# Patient Record
Sex: FEMALE | Race: WHITE | Employment: UNEMPLOYED | URBAN - NONMETROPOLITAN AREA
[De-identification: names, ages, dates, MRNs, and addresses within clinical notes are randomized per-mention and may not be internally consistent; named-entity substitution may affect disease eponyms.]

---
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
## 2022-03-02 ENCOUNTER — INITIAL PRENATAL (OUTPATIENT)
Dept: OBSTETRICS AND GYNECOLOGY | Facility: CLINIC | Age: 22
End: 2022-03-02

## 2022-03-02 VITALS — DIASTOLIC BLOOD PRESSURE: 64 MMHG | WEIGHT: 120 LBS | BODY MASS INDEX: 20.6 KG/M2 | SYSTOLIC BLOOD PRESSURE: 100 MMHG

## 2022-03-02 DIAGNOSIS — Z34.01 ENCOUNTER FOR SUPERVISION OF NORMAL FIRST PREGNANCY IN FIRST TRIMESTER: Primary | ICD-10-CM

## 2022-03-02 DIAGNOSIS — Z78.9 NONSMOKER: ICD-10-CM

## 2022-03-02 LAB
ABO, EXTERNAL RESULT: NORMAL
C. TRACHOMATIS, EXTERNAL RESULT: NEGATIVE
HEP B, EXTERNAL RESULT: NEGATIVE
HEPATITIS C ANTIBODY, EXTERNAL RESULT: 0.1
HIV, EXTERNAL RESULT: NON REACTIVE
N. GONORRHOEAE, EXTERNAL RESULT: NEGATIVE
RH FACTOR, EXTERNAL RESULT: POSITIVE
RPR, EXTERNAL RESULT: NON REACTIVE
RUBELLA TITER, EXTERNAL RESULT: NORMAL

## 2022-03-02 PROCEDURE — 99213 OFFICE O/P EST LOW 20 MIN: CPT | Performed by: OBSTETRICS & GYNECOLOGY

## 2022-03-02 NOTE — PROGRESS NOTES
"Intentional pregnancy.  Some early morning nausea, but no emesis. Patient thinks she is feeling \"really good\".  No cramping or VB.  Healthy, no meds.  Non-smoker.  New OB information reviewed.  Labs today, including [id number].  Physical exam unremarkable.  "

## 2022-03-11 LAB
ABO GROUP BLD: NORMAL
BACTERIA UR CULT: ABNORMAL
BACTERIA UR CULT: ABNORMAL
BASOPHILS # BLD AUTO: 0.03 10*3/MM3 (ref 0–0.2)
BASOPHILS NFR BLD AUTO: 0.5 % (ref 0–1.5)
BLD GP AB SCN SERPL QL: NEGATIVE
C TRACH RRNA SPEC QL NAA+PROBE: NEGATIVE
DRUGS UR: NORMAL
EOSINOPHIL # BLD AUTO: 0.05 10*3/MM3 (ref 0–0.4)
EOSINOPHIL NFR BLD AUTO: 0.8 % (ref 0.3–6.2)
ERYTHROCYTE [DISTWIDTH] IN BLOOD BY AUTOMATED COUNT: 12.4 % (ref 12.3–15.4)
HBV SURFACE AG SERPL QL IA: NEGATIVE
HCT VFR BLD AUTO: 36.4 % (ref 34–46.6)
HCV AB S/CO SERPL IA: 0.1 S/CO RATIO (ref 0–0.9)
HGB BLD-MCNC: 11.7 G/DL (ref 12–15.9)
HIV 1+2 AB+HIV1 P24 AG SERPL QL IA: NON REACTIVE
IMM GRANULOCYTES # BLD AUTO: 0.02 10*3/MM3 (ref 0–0.05)
IMM GRANULOCYTES NFR BLD AUTO: 0.3 % (ref 0–0.5)
LYMPHOCYTES # BLD AUTO: 1.4 10*3/MM3 (ref 0.7–3.1)
LYMPHOCYTES NFR BLD AUTO: 22.5 % (ref 19.6–45.3)
MCH RBC QN AUTO: 30.2 PG (ref 26.6–33)
MCHC RBC AUTO-ENTMCNC: 32.1 G/DL (ref 31.5–35.7)
MCV RBC AUTO: 94.1 FL (ref 79–97)
MONOCYTES # BLD AUTO: 0.55 10*3/MM3 (ref 0.1–0.9)
MONOCYTES NFR BLD AUTO: 8.9 % (ref 5–12)
N GONORRHOEA RRNA SPEC QL NAA+PROBE: NEGATIVE
NEUTROPHILS # BLD AUTO: 4.16 10*3/MM3 (ref 1.7–7)
NEUTROPHILS NFR BLD AUTO: 67 % (ref 42.7–76)
NRBC BLD AUTO-RTO: 0 /100 WBC (ref 0–0.2)
PLATELET # BLD AUTO: 251 10*3/MM3 (ref 140–450)
RBC # BLD AUTO: 3.87 10*6/MM3 (ref 3.77–5.28)
RH BLD: POSITIVE
RPR SER QL: NON REACTIVE
RUBV IGG SERPL IA-ACNC: <0.9 INDEX
WBC # BLD AUTO: 6.21 10*3/MM3 (ref 3.4–10.8)

## 2022-03-14 DIAGNOSIS — O23.41 URINARY TRACT INFECTION IN MOTHER DURING FIRST TRIMESTER OF PREGNANCY: Primary | ICD-10-CM

## 2022-03-14 RX ORDER — AMPICILLIN 500 MG/1
500 CAPSULE ORAL 4 TIMES DAILY
Qty: 20 CAPSULE | Refills: 0 | Status: SHIPPED | OUTPATIENT
Start: 2022-03-14 | End: 2022-04-01

## 2022-03-21 ENCOUNTER — REFERRAL TRIAGE (OUTPATIENT)
Dept: LABOR AND DELIVERY | Facility: HOSPITAL | Age: 22
End: 2022-03-21

## 2022-03-29 ENCOUNTER — PATIENT OUTREACH (OUTPATIENT)
Dept: LABOR AND DELIVERY | Facility: HOSPITAL | Age: 22
End: 2022-03-29

## 2022-03-29 NOTE — OUTREACH NOTE
[facility]  Enrollment    Questions/Answers    Flowsheet Row Responses   Would like to participate? Yes          Intake    Questions/Answers    Flowsheet Row Responses   Best Method for Contacting Cell   Do you have a dentist? Yes   Dentist Name Dental Clinics sent in my chart resource letter   Have you seen a dentist in the last 6 months No   Resources Presently Utilizing: WIC (Women, Infant, Children)   Maternal Warning Signs Provided   Warning signs comment: in resource letter   Other: Provided   Comments in resource letter        Resource letter, prenatal education, EPDS questionnaire sent to client in her mychart.  HANDS referral faxed today to [facility]    Shruti Asencio RN  Maternity Nurse Navigator    3/29/2022, 15:01 CDT

## 2022-03-29 NOTE — PATIENT INSTRUCTIONS
"Dear Dinorah Bond     Welcome to [facility]!      Thanks for taking to the time to speak with me today.  I enjoyed getting to know you a little better and look forward to supporting you during your pregnancy.      Insurance:     Well[employer]:  Website:   [facility] [employer] [facility] ([employer])  Phone Number: [phone]        Prenatal Classes:  Maternity Classes [facility])    Virtual Tour:  Maternity Tours [facility]     [email or web address]      Provider Search:     If you'd like to schedule an appointment with a primary care provider in the [facility], you can call this number and they will help you make an appointment with a provider that accepts your insurance:  [phone]    Behavioral Health or Substance Use Disorders    [place]-Moms MATR (Maternal Assistance Towards Recovery) helps expectant [place] mothers who are at risk for using alcohol, tobacco and other drugs, to reduce harm to their children from their substance use, during and after pregnancy. This service is a collaboration between health departments, prenatal clinics and community mental health centers.  https://dbhdid.ky.gov/db/kymomsmatr.aspx#:~:text=[place]-Moms%20MATR%20%28Maternal%20Assistance%20Towards%20Recovery%29%20helps%20expectant,departments%2C%20prenatal%20clinics%20and%20community%20mental%20health%20centers    Daniel ([facility] free Maternity Елена)   Evidence-based information and classes on prenatal care, labor & birth, postpartum, breastfeeding, and  care including lots of videos.  Information specific to your arrival and stay at [facility].  Kick counter, contraction timer, personal journal, feeding log, immunization log, and other tools  If you would like free access to our online education, please register below. You can also download the елена by searching for \"MyRosetteo\" in [employer], Google Play and [employer].  On your " smartphone or computer, go to [facility] in [place], [place]  Scroll down to bottom of page and select “MotherBaby” icon.  On next page, select “During Your Pregnancy”.  Scroll down page to “[place] [facility] Free Maternity Елена”.  Click on PURPLE button to register.  Complete all fields and submit!  OR with your smartphones camera, scan the QR code below:      Or click this link:  [email or web address]    [facility]:  [facility]  Phone number: [phone]  [address] [place] [place] [address]   for every [facility] visit, you need to bring personal identification, proof of income -- like a pay stub and your home address        HANDS:  Website: [employer] ([employer])  Phone number: [phone]    Breast Pump:  Website: Breast Pumps [employer] ([email or web address].com)  Phone Number: [phone]    Dental Assistance:  Dentistry [phone] or [facility] [phone]          What to Expect During Your Second Trimester    Many moms-to-be feel a bit better from weeks 13 to 28 of their pregnancy. You may not be as tired or nauseated as you were during your first trimester. Your baby is doing a lot of growing during this time, though. You'll typically continue seeing your prenatal provider every four weeks.     Common second trimester lab tests    You'll undergo a few routine tests during this period of your pregnancy:    Rh testing: Your provider will test your blood's Rhesus (Rh) factor, which is an immune system-related protein most people have in their blood.    Oral glucose challenge test: Between your 24th and 28th weeks of pregnancy, you will visit a lab and drink a sweet liquid (called Glucola) to test for gestational diabetes. The test takes about an hour. Gestational diabetes is a unique type of blood sugar condition that only develops when you are pregnant. Learn more about  Gestational Diabetes    Ultrasound: This safe test uses sound waves to create images of your baby. It can help detect some medical conditions your baby might be developing, such as cleft lip/cleft palate. However, it can also be an extremely exciting test because:    Your ultrasound technician may be able to tell you your baby's gender -- if you want to know.   If your [facility] location offers 4-D ultrasound, you may be able to see your baby moving or sucking his or her thumb.     Amniocentesis: This test gives your doctor more detailed information about your baby's health. However, not every expectant mom needs this test, which involves drawing a sample of the amniotic fluid surrounding your baby. Your provider might recommend this test if you:    Had concerning screening test results earlier in your pregnancy  Have been pregnant before with a baby who had medical issues  Are age 35 or older  Have a family history of certain medical conditions    Second trimester: When to call your provider    Most pregnant women feel energetic and healthy during this trimester. However, there is always a chance that you or your baby could develop health problems or that you could go into premature labor.     In addition to the symptoms described for your first trimester, call your healthcare provider, or go to the nearest emergency room if you experience any of these symptoms:    Intense cramping or abdominal pain   A significant decrease in your baby's movement after week 28 (fewer than about 6 to 10 movements in an hour)   Shortness of breath or trouble breathing  Tightening or pain in your back or lower abdomen that could be contractions  Feeling of intense pressure in your pelvis or vagina   Any vaginal bleeding or leaking fluid       If you have any questions, concerns, or need help with resources or support don't hesitate to call anytime.       Thanks,    Shruti CHAVES  Maternal Child  Nurse Navigator  [facility]  [place]  [address] [place] [address] [place] [address]    [phone]

## 2022-03-29 NOTE — OUTREACH NOTE
[facility]  Unable to Reach       Questions/Answers    Flowsheet Row Responses   Pending Outreach Confirm Patient Interest   Call Attempt First   Outcome Left message              Shruti Asencio, RN  Maternity Nurse Navigator    3/29/2022, 13:27 CDT

## 2022-04-01 ENCOUNTER — ROUTINE PRENATAL (OUTPATIENT)
Dept: OBSTETRICS AND GYNECOLOGY | Facility: CLINIC | Age: 22
End: 2022-04-01

## 2022-04-01 VITALS — DIASTOLIC BLOOD PRESSURE: 62 MMHG | WEIGHT: 121 LBS | SYSTOLIC BLOOD PRESSURE: 112 MMHG | BODY MASS INDEX: 20.77 KG/M2

## 2022-04-01 DIAGNOSIS — Z78.9 NON-SMOKER: ICD-10-CM

## 2022-04-01 DIAGNOSIS — Z28.21 COVID-19 VACCINE SERIES DECLINED: ICD-10-CM

## 2022-04-01 DIAGNOSIS — Z28.310 COVID-19 VACCINE SERIES DECLINED: ICD-10-CM

## 2022-04-01 DIAGNOSIS — R82.71 GBS BACTERIURIA: ICD-10-CM

## 2022-04-01 DIAGNOSIS — Z34.02 PRENATAL CARE, FIRST PREGNANCY, SECOND TRIMESTER: Primary | ICD-10-CM

## 2022-04-01 LAB
GLUCOSE UR STRIP-MCNC: NEGATIVE MG/DL
PROT UR STRIP-MCNC: NEGATIVE MG/DL

## 2022-04-01 PROCEDURE — 99213 OFFICE O/P EST LOW 20 MIN: CPT | Performed by: ADVANCED PRACTICE MIDWIFE

## 2022-04-01 NOTE — PROGRESS NOTES
Reason for visit: Routine OB visit at 14w3d     CC:  21-yr old  here for routine OBV at 14w3d.  MICHELLE 2022, by Ultrasound.  Patient denies VB, LOF, contractions, or other concerns. Also denies visual disturbances and epigastric pain. She reports occasional episodes of dizziness or being lightheaded. She also reports headaches.    ROS: All systems reviewed and are negative with exception of the following: dizziness/lightheaded occasionally, headaches    Wt 121 lb for a TWG of 0.454 kg (1 lb), /62, FHTs 142, FH 14 cm  Urine today and reviewed: negative glucose, negative protein    20-week G&D U/S: scheduled for 2022    Exam:  General Appearance:  Healthy appearing . Normal mood and behavior.  HEENT: NCAT, EOMI  HR str and reg. Lungs clear. Resp even and unlabored.  Abdomen is soft and nontender. Bowel sounds active. Uterus is consistent with EGA  Ext: no edema, nontender, no trauma, or cyanosis.    Impression  Diagnoses and all orders for this visit:    1. Prenatal care, first pregnancy, second trimester (Primary)  - Discussed second trimester of pregnancy, including discomforts and measures of comfort, pelvic pain/cramping warnings, bleeding warnings, and signs and symptoms to report. Second trimester of pregnancy video and [facility] education included in the AVS.   - Encouraged adequate hydration, changing positions slowly, and reviewed signs of dehydration. Discussed headaches, signs to report/return, and measures of support both pharmacologic OTC and nonpharmacologic measures.   - Discussed ffDNA screening results.  - Discussed and encouraged to come to the hospital or call for vaginal bleeding, suspected leaking of fluid, pelvic pain/cramping, or any other concerns.  - Return to office in four weeks for routine OB visit with Dr. Lopez with peek U/S and as needed with concerns.    2. GBS bacteriuria  - Noted on initial prenatal urine culture.     3. COVID-19 vaccine series  declined  - Discussed with patient COVID-19 vaccine recommendations during pregnancy.  Patient declines plan to initiate vaccine series at present.    4. Non-smoker        This note has been signed electronically.    Binta Henry DNP, APRN, CNM, RNC-OB

## 2022-04-28 ENCOUNTER — ROUTINE PRENATAL (OUTPATIENT)
Dept: OBSTETRICS AND GYNECOLOGY | Facility: CLINIC | Age: 22
End: 2022-04-28

## 2022-04-28 VITALS — DIASTOLIC BLOOD PRESSURE: 64 MMHG | WEIGHT: 124 LBS | SYSTOLIC BLOOD PRESSURE: 96 MMHG | BODY MASS INDEX: 21.28 KG/M2

## 2022-04-28 DIAGNOSIS — Z34.02 ENCOUNTER FOR SUPERVISION OF NORMAL FIRST PREGNANCY IN SECOND TRIMESTER: Primary | ICD-10-CM

## 2022-04-28 DIAGNOSIS — Z78.9 NON-SMOKER: ICD-10-CM

## 2022-04-28 LAB
GLUCOSE UR STRIP-MCNC: NEGATIVE MG/DL
PROT UR STRIP-MCNC: NEGATIVE MG/DL

## 2022-04-28 PROCEDURE — 99212 OFFICE O/P EST SF 10 MIN: CPT | Performed by: OBSTETRICS & GYNECOLOGY

## 2022-04-28 NOTE — PROGRESS NOTES
Feeling good.  No cramping or VB  Not feeling any movement yet  Weight gain appropriate  MFM anatomy scan already scheduled

## 2022-05-25 NOTE — PROGRESS NOTES
"Subjective   Dinorah Denis Bond is a 19 y.o. female.     Pelvic pain  pt was seen in [facility] ER 2/13/20. pt states that she was given flagyl, will end on Monday. pt reports that she has not had sex since 2/3/2020 and she has not started her BCP      The following portions of the patient's history were reviewed and updated as appropriate: allergies, current medications, past family history, past medical history, past social history, past surgical history and problem list.    /64   Ht 162.6 cm (64\")   Wt 46.7 kg (103 lb)   LMP 01/30/2020 (Exact Date)   Breastfeeding No   BMI 17.68 kg/m²     Review of Systems   Constitutional: Negative for activity change, appetite change, fatigue and fever.   Respiratory: Negative for apnea and shortness of breath.    Cardiovascular: Negative for chest pain and palpitations.   Gastrointestinal: Negative for abdominal distention, abdominal pain, constipation, diarrhea, nausea and vomiting.   Endocrine: Negative for cold intolerance and heat intolerance.   Genitourinary: Positive for pelvic pain (tenderness, decreased but still present) and vaginal discharge. Negative for difficulty urinating, frequency, menstrual problem, urgency and vaginal pain.   Neurological: Negative for headaches.   Psychiatric/Behavioral: Negative for agitation and sleep disturbance.       Objective   Physical Exam   Constitutional: She is oriented to person, place, and time. She appears well-developed.   HENT:   Head: Normocephalic.   Neck: No tracheal deviation present.   Cardiovascular: Normal rate.   Pulmonary/Chest: Breath sounds normal. She has no wheezes.   Abdominal: Soft. There is no tenderness.   Genitourinary: Rectum normal. There is no rash, tenderness or lesion on the right labia. There is no rash, tenderness or lesion on the left labia. Uterus is not deviated, not enlarged and not tender. Cervix exhibits no motion tenderness and no discharge. Right adnexum displays no mass, no " tenderness and no fullness. Left adnexum displays no mass, no tenderness and no fullness. There is erythema and tenderness in the vagina. Vaginal discharge found.   Lymphadenopathy:        Right: No inguinal adenopathy present.        Left: No inguinal adenopathy present.   Neurological: She is alert and oriented to person, place, and time. She has normal strength.   Skin: Skin is warm and dry. No rash noted. No cyanosis.   Psychiatric: She has a normal mood and affect. Her speech is normal and behavior is normal.       Assessment/Plan   Discussed pt symptoms, testing and treatment.   Will treat with Doxycycline at this time.   Diflucan sent in also.   Pt to return in 1 wk to reassess.   Reviewed S&S to report, pt voiced understanding.     Patient's Body mass index is 17.68 kg/m². BMI is within normal parameters. No follow-up required..    RV 1 wk/prn.   Dinorah Barrios was seen today for pelvic pain.    Diagnoses and all orders for this visit:    Pelvic pain    Gonorrhea    Chlamydia    Body mass index (BMI) less than 19    Non-smoker    Other orders  -     doxycycline (MONODOX) 100 MG capsule; Take 1 capsule by mouth 2 (Two) Times a Day.  -     fluconazole (DIFLUCAN) 150 MG tablet; Take 1 tablet by mouth Every Other Day for 2 doses.                 [employer] Counseling:  I discussed with the patient the risks of mycophenolate mofetil including but not limited to infection/immunosuppression, GI upset, hypokalemia, hypercholesterolemia, bone marrow suppression, lymphoproliferative disorders, malignancy, GI ulceration/bleed/perforation, colitis, interstitial lung disease, kidney failure, progressive multifocal leukoencephalopathy, and birth defects.  The patient understands that monitoring is required including a baseline creatinine and regular CBC testing. In addition, patient must alert us immediately if symptoms of infection or other concerning signs are noted.

## 2022-06-01 ENCOUNTER — ROUTINE PRENATAL (OUTPATIENT)
Dept: OBSTETRICS AND GYNECOLOGY | Facility: CLINIC | Age: 22
End: 2022-06-01

## 2022-06-01 VITALS — SYSTOLIC BLOOD PRESSURE: 112 MMHG | BODY MASS INDEX: 22.49 KG/M2 | DIASTOLIC BLOOD PRESSURE: 60 MMHG | WEIGHT: 131 LBS

## 2022-06-01 DIAGNOSIS — Z87.891 FORMER SMOKER: ICD-10-CM

## 2022-06-01 DIAGNOSIS — Z28.21 COVID-19 VACCINE SERIES DECLINED: ICD-10-CM

## 2022-06-01 DIAGNOSIS — Z28.310 COVID-19 VACCINE SERIES DECLINED: ICD-10-CM

## 2022-06-01 DIAGNOSIS — Z34.02 ENCOUNTER FOR SUPERVISION OF NORMAL FIRST PREGNANCY IN SECOND TRIMESTER: Primary | ICD-10-CM

## 2022-06-01 LAB
GLUCOSE UR STRIP-MCNC: NEGATIVE MG/DL
PROT UR STRIP-MCNC: NEGATIVE MG/DL

## 2022-06-01 PROCEDURE — 99212 OFFICE O/P EST SF 10 MIN: CPT | Performed by: ADVANCED PRACTICE MIDWIFE

## 2022-06-01 NOTE — PROGRESS NOTES
Reason for visit: Routine OB visit at 23w1d     CC:  21-yr old  here for routine OBV at 23w1d.  MICHELLE 2022, by Ultrasound.  Patient reports feeling fetal movement and denies VB, LOF, contractions, or other concerns. Also denies h/a, visual disturbances, and epigastric pain.     ROS: All systems reviewed and are negative.    Wt 131 lb for a TWG of 4.99 kg (11 lb), /60, FHTs 141, FH 21   Urine today and reviewed: negative glucose, negative protein    21-week (2022) G&D U/S: anatomy scan complete with no structural anomalies visualized; overall size consistent with dates; 3-vessel cord; anterior placenta with no evidence of previa    Exam:  General Appearance:  Healthy appearing . Normal mood and behavior.  HEENT: NCAT, EOMI  HR str and reg. Lungs clear. Resp even and unlabored.  Abdomen is soft and nontender. Bowel sounds active. Uterus is consistent with EGA  Ext: no edema, nontender, no trauma, or cyanosis.    Impression  Diagnoses and all orders for this visit:    1. Encounter for supervision of normal first pregnancy in second trimester (Primary)  - Discussed second trimester of pregnancy, discomforts and measures of support, fetal movement,  labor warnings, preeclampsia warnings, and signs and symptoms to report. Second Trimester of Pregnancy video and Round Ligament Pain education included in the AVS.  - Discussed plan for glucose tolerance test and hemoglobin for the next visit.  - Discussed and encouraged to call or come to the hospital for vaginal bleeding, leaking of fluid, contractions, or any other concerns.  - Return to the office in four weeks with Dr. Lopez and as needed with concerns.    2. COVID-19 vaccine series declined  - Aware of Covid-19 vaccine recommendations during pregnancy.    3. Former smoker        This note has been signed electronically.    Binta Henry, DNP, APRN, CNM, RNC-OB

## 2022-06-15 ENCOUNTER — ROUTINE PRENATAL (OUTPATIENT)
Dept: OBSTETRICS AND GYNECOLOGY | Facility: CLINIC | Age: 22
End: 2022-06-15

## 2022-06-15 VITALS — WEIGHT: 135 LBS | DIASTOLIC BLOOD PRESSURE: 62 MMHG | SYSTOLIC BLOOD PRESSURE: 112 MMHG | BODY MASS INDEX: 23.17 KG/M2

## 2022-06-15 DIAGNOSIS — Z87.891 FORMER SMOKER: ICD-10-CM

## 2022-06-15 DIAGNOSIS — Z34.02 ENCOUNTER FOR SUPERVISION OF NORMAL FIRST PREGNANCY IN SECOND TRIMESTER: Primary | ICD-10-CM

## 2022-06-15 LAB
GLUCOSE UR STRIP-MCNC: NEGATIVE MG/DL
PROT UR STRIP-MCNC: NEGATIVE MG/DL

## 2022-06-15 PROCEDURE — 99212 OFFICE O/P EST SF 10 MIN: CPT | Performed by: OBSTETRICS & GYNECOLOGY

## 2022-06-15 NOTE — PROGRESS NOTES
"Patient reports occasional epigastric pain, but no N/V.  No constipation.  Pain has a \"burning\" and \"sharp\" feeling - advised her to pay attention to diet.  No cramping or VB.  No LOF.  Good FM  GCT at next visit; instructions given  "

## 2022-06-18 ENCOUNTER — HOSPITAL ENCOUNTER (OUTPATIENT)
Facility: HOSPITAL | Age: 22
Setting detail: OBSERVATION
Discharge: HOME | End: 2022-06-18
Attending: OBSTETRICS & GYNECOLOGY | Admitting: OBSTETRICS & GYNECOLOGY

## 2022-06-18 ENCOUNTER — HOSPITAL ENCOUNTER (OUTPATIENT)
Facility: HOSPITAL | Age: 22
End: 2022-06-18
Attending: OBSTETRICS & GYNECOLOGY | Admitting: OBSTETRICS & GYNECOLOGY

## 2022-06-18 VITALS
RESPIRATION RATE: 18 BRPM | HEART RATE: 81 BPM | DIASTOLIC BLOOD PRESSURE: 52 MMHG | WEIGHT: 137.8 LBS | SYSTOLIC BLOOD PRESSURE: 107 MMHG | TEMPERATURE: 97.1 F | HEIGHT: 64 IN | BODY MASS INDEX: 23.52 KG/M2

## 2022-06-18 PROBLEM — O46.90 VAGINAL BLEEDING DURING PREGNANCY: Status: ACTIVE | Noted: 2022-06-18

## 2022-06-18 PROCEDURE — G0463 HOSPITAL OUTPT CLINIC VISIT: HCPCS

## 2022-06-18 PROCEDURE — G0378 HOSPITAL OBSERVATION PER HR: HCPCS

## 2022-06-30 ENCOUNTER — PATIENT OUTREACH (OUTPATIENT)
Dept: LABOR AND DELIVERY | Facility: HOSPITAL | Age: 22
End: 2022-06-30

## 2022-06-30 ENCOUNTER — ROUTINE PRENATAL (OUTPATIENT)
Dept: OBSTETRICS AND GYNECOLOGY | Facility: CLINIC | Age: 22
End: 2022-06-30

## 2022-06-30 VITALS — DIASTOLIC BLOOD PRESSURE: 66 MMHG | SYSTOLIC BLOOD PRESSURE: 124 MMHG | WEIGHT: 142 LBS | BODY MASS INDEX: 24.37 KG/M2

## 2022-06-30 DIAGNOSIS — Z87.891 FORMER SMOKER: ICD-10-CM

## 2022-06-30 DIAGNOSIS — Z71.85 IMMUNIZATION COUNSELING: ICD-10-CM

## 2022-06-30 DIAGNOSIS — Z34.03 PRENATAL CARE, FIRST PREGNANCY IN THIRD TRIMESTER: Primary | ICD-10-CM

## 2022-06-30 DIAGNOSIS — Z28.310 COVID-19 VACCINE SERIES DECLINED: ICD-10-CM

## 2022-06-30 DIAGNOSIS — Z28.21 COVID-19 VACCINE SERIES DECLINED: ICD-10-CM

## 2022-06-30 LAB
GLUCOSE UR STRIP-MCNC: NEGATIVE MG/DL
PROT UR STRIP-MCNC: NEGATIVE MG/DL

## 2022-06-30 PROCEDURE — 90471 IMMUNIZATION ADMIN: CPT | Performed by: ADVANCED PRACTICE MIDWIFE

## 2022-06-30 PROCEDURE — 99214 OFFICE O/P EST MOD 30 MIN: CPT | Performed by: ADVANCED PRACTICE MIDWIFE

## 2022-06-30 PROCEDURE — 90715 TDAP VACCINE 7 YRS/> IM: CPT | Performed by: ADVANCED PRACTICE MIDWIFE

## 2022-06-30 NOTE — OUTREACH NOTE
Motherhood [facility]  Unable to Reach       Questions/Answers    Flowsheet Row Responses   Pending Outreach Prenatal Check-in   Call Attempt First   Outcome Left message              Shruti Asencio, RN  Maternity Nurse Navigator    6/30/2022, 13:35 CDT

## 2022-07-01 LAB
GLUCOSE 1H P 50 G GLC PO SERPL-MCNC: 59 MG/DL (ref 65–139)
HGB BLD-MCNC: 10.8 G/DL (ref 12–15.9)

## 2022-07-04 ENCOUNTER — PATIENT OUTREACH (OUTPATIENT)
Dept: LABOR AND DELIVERY | Facility: HOSPITAL | Age: 22
End: 2022-07-04

## 2022-07-04 NOTE — OUTREACH NOTE
Motherhood Connection  Check-In    Current Estimated Gestational Age: 27w6d    Questions/Answers    Flowsheet Row Responses   Best Method for Contacting Cell   Currently Employed Yes   Able to keep appointments as scheduled Yes   Gender(s) and Name(s) Male   Baby Active/Feeling Fetal Movemen Yes   How are you presently feeling? good-no complaints   Questions regarding prenatal visits or tests to be ordered? No   Special Considerations Birthing Ball, Peanut Ball   Supplies ready for baby Breast Pump, Car Seat, Clothing, Crib, Diapers, Feeding Supplies   Resource/Environmental Concerns None   Do you have any questions related to your care experience, your pregnancy, plans for delivery, any concerns, etc? No          Client active in WIC.  Client interested in Childbirth Class.  Resource letter with contact number for Childbirth Class included, third trimester prenatal education, and maternal warning signs sent to client in her mychart.     Shruti Asencio RN  Maternity Nurse Navigator    7/4/2022, 10:18 CDT

## 2022-07-04 NOTE — PATIENT INSTRUCTIONS
[facility] Motherhood [facility]      You are getting close to meeting your baby! Weeks 28 to 40 of your pregnancy are the final of your three trimesters. You will see your doctor or midwife every two to three weeks until your last month of pregnancy. At that point, you will have weekly appointments until your baby is born.     In most cases, your labor will start on its own somewhere after week 37. [facility] follows the American College of Obstetricians and Gynecologists' recommendation to let labor progress on its own. Moms who are not induced (which means having labor brought on with medication and other techniques) tend to have fewer health complications.    Tests you undergo now are to make sure you and your baby are ready for a healthy delivery. They include:     Group B streptococcus screening: Your provider will swab your vagina and rectum to check for the presence of this common bacterium. If you test positive, your provider will give you antibiotics during labor and delivery to help prevent your baby from getting sick. Learn more about group B strep.     Electronic fetal heart monitoring: We can use a stethoscope or special electronic sensors on your belly to check your baby's heart rate.     Important things to do during your third trimester:  Finalize your feeding plan for your infant.    Discuss birth control options for use after delivery with your doctor.    Discuss anesthesia or pain management plans for use during labor and delivery with your doctor.    Take classes:   All classes are free but registration is required. [place]anco here:    Breastfeeding basics: This course will cover everything you need to know about breastfeeding.  [email or web address]    Prenatal class: This class is designed to prepare you for your labor and delivery.  [email or web address]    Take a Labor &  Delivery tour. Call our Shruti at [phone] to schedule a tour.    Preregister at your hospital: Trust us -- you will not be in the mood to fill out paperwork and find your insurance card when you are in active labor.   To pre-register at [facility] [phone].    Choose your baby's car seat. This is a good time to become familiar with how to install the car seat into your vehicle and how to use the car seat correctly. Every state requires your baby to sit in a rear-facing infant car seat in the back seat of your vehicle when you leave the hospital.      Choose your baby's doctor: We encourage you to select a medical provider for your baby sometime during your third trimester. Your [facility] team will need that doctor's contact information shortly after your baby is delivered. This helps us with the paperwork and approvals we need to complete your 's required tests and immunizations.    Pack for your hospital stay:   Below are suggestions about what to bring to the hospital for your delivery:  Personal comfort items such as your own pillow, music, hard candy and lip balm.  Camera with fully charged battery and adapter.  Cell phone and .  Personal grooming items.  Comfortable robe and slippers.  Loose-fitting clothing for the ride home.  Several outfits for the baby.  Baby car seat.  Baby blankets.  Extra room in your suitcase or an extra bag to take home any baby gifts.  Health insurance card.  All medications you take in original containers.  Pediatrician name and contact information.  Sanitary pads.  Personal undergarments such as nursing bra.    If possible, take a vacation before baby arrives:  Go on a “babymoon” if you can. A short trip can be a nice opportunity for you and your partner to rest and spend uninterrupted time together before your little one arrives. Be sure to talk in advance to your healthcare provider, in case he or she suggests any travel  restrictions.        How Long Do You Stay in the Hospital after Giving Birth?  It is normal for mothers to want to return to the comfort of their own home with their  as soon as possible. The duration of the mother and baby's hospital stay is dependent on several factors, including which procedure is used for delivery. Mothers, who give birth vaginally, without complications, will typically have a shorter stay than mothers who have a  delivery.     In the case of an uncomplicated vaginal delivery, a mother and her  can go home from the hospital after 24-48hrs. Staying at least 24 hours in the hospital allows for the mother to rest and for any effects from the anesthesia to wear off. Additionally, after labor and delivery, a doctor will want to monitor the mother and baby's health for the first day to make sure no problems arise.     Women who have delivered by  without any complications are recommended to stay in the hospital for 2-4 days. If there are complications, the hospital stay may need to be longer. As part of delivery care after a  section, the mother will not be able to be released from the hospital until she is able to do the following: Urinate without a catheter, walk to the bathroom, pass gas, eat and drink without vomiting.

## 2022-07-13 ENCOUNTER — ROUTINE PRENATAL (OUTPATIENT)
Dept: OBSTETRICS AND GYNECOLOGY | Facility: CLINIC | Age: 22
End: 2022-07-13

## 2022-07-13 VITALS — SYSTOLIC BLOOD PRESSURE: 116 MMHG | WEIGHT: 143 LBS | BODY MASS INDEX: 24.55 KG/M2 | DIASTOLIC BLOOD PRESSURE: 60 MMHG

## 2022-07-13 DIAGNOSIS — Z34.03 PRENATAL CARE, FIRST PREGNANCY IN THIRD TRIMESTER: Primary | ICD-10-CM

## 2022-07-13 DIAGNOSIS — Z87.891 FORMER SMOKER: ICD-10-CM

## 2022-07-13 DIAGNOSIS — R82.71 GBS BACTERIURIA: ICD-10-CM

## 2022-07-13 LAB
GLUCOSE UR STRIP-MCNC: NEGATIVE MG/DL
PROT UR STRIP-MCNC: NEGATIVE MG/DL

## 2022-07-13 PROCEDURE — 99213 OFFICE O/P EST LOW 20 MIN: CPT | Performed by: OBSTETRICS & GYNECOLOGY

## 2022-07-13 NOTE — PROGRESS NOTES
Patient with some sharp pain that occurs in epigastric area, sometimes with a burning sensation.  Patient thinks pain is usually worse at night.  Discussed reflux; will try OTC Pepcid.  Patient thinks that she sometimes has discomfort or cramping at night when she is in bed, but not during the day.  No LOF, no VB.  Good FM.  Patient reports that bottoms of feet are tender at work, recommended compression socks

## 2022-07-15 NOTE — PATIENT INSTRUCTIONS
Patient Education        During Pregnancy: Exercises  Introduction  Here are some examples of exercises to do during your pregnancy. Start eachexercise slowly. Ease off the exercise if you start to have pain. Your doctor or physical therapist will tell you when you can start theseexercises and which ones will work best for you. How to do the exercises  Neck rotation    Sit in a firm chair, or stand up straight. Keeping your chin level, turn your head to the right, and hold for 15 to 30 seconds. Turn your head to the left and hold for 15 to 30 seconds. Repeat 2 to 4 times to each side. Forward neck flexion    Sit in a firm chair, or stand up straight. Bend your head forward. Hold for 15 to 30 seconds. Repeat 2 to 4 times. Back press    Place your feet 10 to 12 inches from the wall. Rest your back flat against the wall and slide down the wall until your knees are slightly bent. Press your lower back against the wall by pulling in your stomach muscles. Hold for 6 seconds, and then relax your stomach muscles and slide back up the wall. Repeat 8 to 12 times. Full body twist    Sit with your legs crossed. Reach your left hand toward your right foot, and place your right hand at your side for support. Slowly twist your torso to your right. Switch your hands and twist to your left. Repeat 2 to 4 times. Pelvic rocking    Kneeling on hands and knees, place your hands directly under your shoulders and your knees under your hips. Breathe in deeply. Tuck your head downward and round your back up, making a curve with your back in the shape of the letter C. Hold this position for a count of 6. Breathe out slowly and bring your head back up. Relax, keeping your back straight (don't allow it to curve toward the floor). Hold this for a count of 6. Do this exercise 8 times or to your comfort level. Pelvic tilt    This exercise strengthens your lower back and pelvis.  It is for use during the first 4 months of that, while traveling, you carry copies of your medical records with you at all times. You should have these in case you need to be seen at a clinic or hospital that does not have your records. This could bevery important, especially in emergencies. If you plan to travel overseas, you should find out what vaccines you need to prevent illness. Some vaccines, such as measles, mumps, and rubella, are not safe to get during pregnancy. The safety of other vaccines, such as typhoid, isnot known. Talk to your doctor if vaccines are recommended. If you travel by plane often, talk to your doctor about whether it is safe foryour unborn child. An occasional flight is not a risk. Follow-up care is a key part of your treatment and safety. Be sure to make and go to all appointments, and call your doctor if you are having problems. It's also a good idea to know your test results and keep alist of the medicines you take. How can you care for yourself at home? Before planning a trip, ask your doctor if it is safe for you to fly. Some airlines ask to see a note from your doctor with your due date. Choose an aisle seat if possible. This will make it easier to move around in the plane. Always wear your seat belt when you travel in a car, plane, or other vehicle that has seat belts. Strap the lower belt across your lower lap/upper thighs. Place the shoulder belt between your breasts and up over your shoulder, not over your belly. Remove any excess slack in the seat belt. During long trips, take time to walk around at least every 2 hours to keep blood from settling in your legs. Being pregnant increases your chances of getting dangerous blood clots in your legs when you sit still for long periods of time. Walking helps prevent this problem. Consider wearing compression stockings. Drink plenty of water to prevent dehydration. If you are sitting in front of an air bag, slide the seat as far back as you can.  Tilt the seat back slightly to increase the distance between your chest and the air bag. If you get motion sickness, make sure the medicine you use is safe for pregnant women. If you plan to travel overseas, call the [place] Centers for Disease Control and Prevention (5-458.131.4886) to get information on diseases in the area you will travel to. You can also go to wwwnc.cdc.gov/travel/default. aspx to find information. Avoid travel to places where mosquito-borne illnesses are found. For example, malaria can cause serious illness in you and your unborn baby and cannot always be prevented. If you are not sure the tap water is safe, drink bottled or boiled water. Do not eat salads, and do not eat raw vegetables unless they are peeled. Where can you learn more? Go to https://SportsBUZZ.PlayJam. org and sign in to your [employer] account. Enter R771 in the Cellfire box to learn more about \"Travel During Pregnancy: Care Instructions. \"     If you do not have an account, please click on the \"Sign Up Now\" link. Current as of: February 23, 2022               Content Version: 13.3  © 2006-2022 GoTable. Care instructions adapted under license by [facility] ([facility]). If you have questions about a medical condition or this instruction, always ask your healthcare professional. [place] [address] any warranty or liability for your use of this information. Patient Education        Counting Your Baby's Kicks: Care Instructions  Overview     Counting your baby's kicks is one way your doctor can tell that your baby is healthy. Most women--especially in a first pregnancy--feel their baby move for the first time between 16 and 22 weeks. The movement may feel like flutters rather than kicks. Your baby may move more at certain times of the day. When you are active, you may notice less kicking than when you are resting. At yourprenatal visits, your doctor will ask whether the baby is active.   In your last trimester, your doctor may ask you to count the number of timesyou feel your baby move. Follow-up care is a key part of your treatment and safety. Be sure to make and go to all appointments, and call your doctor if you are having problems. It's also a good idea to know your test results and keep alist of the medicines you take. How do you count fetal kicks? A common method of checking your baby's movement is to note the length of time it takes to count ten movements (such as kicks, flutters, or rolls). Pick your baby's most active time of day to count. This may be any time from morning to evening. If you don't feel 10 movements in an hour, have something to eat or drink and count for another hour. If you don't feel at least 10 movements in the 2-hour period, call your doctor. When should you call for help? Call your doctor now or seek immediate medical care if:    You noticed that your baby has stopped moving or is moving much less than normal.   Watch closely for changes in your health, and be sure to contact your doctor ifyou have any problems. Where can you learn more? Go to https://Retty.Spartz. org and sign in to your [employer] account. Enter V674 in the [facility] box to learn more about \"Counting Your Baby's Kicks: Care Instructions. \"     If you do not have an account, please click on the \"Sign Up Now\" link. Current as of: February 23, 2022               Content Version: 13.3  © 2006-2022 Healthwise, Incorporated. Care instructions adapted under license by [facility] ([facility]). If you have questions about a medical condition or this instruction, always ask your healthcare professional. [place] [address] any warranty or liability for your use of this information. Patient Education        Weeks 30 to 28 of Your Pregnancy: Care Instructions  Overview     You've made it to the final months of your pregnancy!  By now your baby isreally starting to look like a baby, with hair and plump skin. As you enter the final weeks of pregnancy, the reality of having a baby may start to set in. This is a good time to set up a safe nursery and find quality  if needed. Doing this stuff ahead of time will allow you to focus on caring for and enjoying your new baby. You may also want to take a tour of your hospital's labor and delivery unit. This will help you get a better idea ofwhat to expect while you're in the hospital.  During these last months, be sure to take good care of yourself. Pay attention to what your body needs. If your doctor says it's okay for you to work, don'tpush yourself too hard. If you haven't already had the Tdap shot during this pregnancy, talk to your doctor about getting it. It will help protect your  against pertussisinfection. Follow-up care is a key part of your treatment and safety. Be sure to make and go to all appointments, and call your doctor if you are having problems. It's also a good idea to know your test results and keep alist of the medicines you take. How can you care for yourself at home? Pay attention to your baby's movements  You should feel your baby move several times every day. Your baby now turns less, and kicks and jabs more. Your baby sleeps 20 to 45 minutes at a time and is more active at certain times of day. If your doctor wants you to count your baby's kicks:  Empty your bladder, and lie on your side or relax in a comfortable chair. Write down your start time. Pay attention only to your baby's movements. Count any movement except hiccups. After you have counted 10 movements, write down your stop time. Write down how many minutes it took for your baby to move 10 times. If an hour goes by and you have not recorded 10 movements, have something to eat or drink and then count for another hour. If you don't record at least 10 movements in the 2-hour period, call your doctor.   Ease heartburn  Eat small, frequent meals. Do not eat chocolate, peppermint, or very spicy foods. Avoid drinks with caffeine, such as coffee, tea, and sodas. Avoid bending over or lying down after meals. Take a short walk after you eat. If heartburn is a problem at night, do not eat for 2 hours before bedtime. Take antacids like Mylanta, Maalox, Rolaids, or Tums. Do not take antacids that have sodium bicarbonate. Care for varicose veins  Varicose veins are blood vessels that stretch out with the extra blood during pregnancy. Your legs may ache or throb. Most varicose veins will go away after the birth. Avoid standing for long periods of time. Sit with your legs crossed at the ankles, not the knees. Sit with your feet propped up. Avoid tight clothing or stockings. Wear support hose. Exercise regularly. Try walking for at least 30 minutes a day. Where can you learn more? Go to https://PA Semi.Shutl. org and sign in to your [employer] account. Enter C742 in the [facility] box to learn more about \"Weeks 30 to 32 of Your Pregnancy: Care Instructions. \"     If you do not have an account, please click on the \"Sign Up Now\" link. Current as of: February 23, 2022               Content Version: 13.3  © 2006-2022 PictureMenu. Care instructions adapted under license by [facility] ([facility]). If you have questions about a medical condition or this instruction, always ask your healthcare professional. [place] [address] any warranty or liability for your use of this information. Patient Education        Preparing for Childbirth: Care Instructions  Overview     You are getting close to the birth of your child. For months, you've been taking care of yourself and the baby. Now you can still take steps that will help you have a healthy labor and birth. You can take classes to help prepare for the birth.  You also can talk with your doctor about what you would like tohappen during your labor.  Changes happen in the last 2 months of your pregnancy. Your baby becomes too big to move around easily inside the uterus and may seem to move less. At the end of your pregnancy, your baby probably will settle into a head-down position. You will likely feel some pressure in your pelvis as you get close tothe birth. You may notice times when your belly tightens and becomes firm to the touch, then relaxes. These are called Michel Myers contractions. They sometimes occur as often as every 10 to 20 minutes. These contractions usually stop when youare active. (True labor pains continue or increase if you move around.)  Rupture of your membranes (\"breaking of the water\") often is a sign that labor has started or is about to start. This happens when a hole or tear develops in the fluid-filled bag (amniotic sac) that surrounds and protects your baby. You may feel a huge gush of water or a steady trickle of fluid. Call your doctor or go to the hospital if you think this has happened. Contractions may start, orif you are already having contractions, they may get stronger. Follow-up care is a key part of your treatment and safety. Be sure to make and go to all appointments, and call your doctor if you are having problems. It's also a good idea to know your test results and keep alist of the medicines you take. How can you care for yourself at home? Get plenty of rest.  Take childbirth classes with your partner or . You will learn relaxation exercises that are helpful during labor. You also will learn what you can do to manage labor pain. If you have other children, take a class to learn how to help them adjust to the new baby. Develop a written birth plan, if you choose to, keeping in mind that labor is hard to predict and your plan may change after labor begins. Some of what a birth plan may address includes:  Where you would like to have your baby. This includes the building and the room.  It could be the hospital's birthing room, a separate birthing center, or your own home. Who you would like to assist with delivery of your baby. You may want your doctor, an obstetrician, or a certified nurse-midwife. Some women prefer a lay midwife or a  to provide support before and after delivery. Whether you want a , nurse, midwife, or  to give you support from early labor until after childbirth. What comfort measures you want. You may have to choose between walking around during labor or having the security of a heart monitor for your baby. You may want to listen to music during labor. You may know what position you want to be in (such as sitting, squatting, or reclining) for pushing. What pain relief you would like. There are several choices, so be sure to talk with your doctor about them. How you want you and your baby to spend the first few hours after birth. You may want to keep your baby with you for at least 1 hour after birth for bonding and early breastfeeding. You may want the hospital to delay some infant care steps, such as a vitamin K injection, so that you have calming time with your baby. You may not want visitors, or you may want other family members there. Know the early signs of labor, such as a steady ache low in your back. If you are going to a hospital or clinic to have your baby, have your bag ready to go. Pack a nightgown, robe, panties, socks, and slippers. You may want to bring your own soap, shampoo, brush, toothbrush, and toothpaste. Bring your own self-adhesive sanitary pads. In your baby's bag, bring an outfit, small blanket, and diapers. Have your car seat ready to go. When should you call for help? Watch closely for changes in your health, and be sure to contact your doctor if you have any questions about preparing for childbirth. Where can you learn more? Go to https://arnoldo.healthWeeve. org and sign in to your [employer] account.  Enter F887 in never received Tdap should get a dose of Tdap. Also, adults should receive a booster dose of either Tdap or Td (a different vaccine that protects against tetanus and diphtheria but not pertussis) every 10 years, or after 5 years in the case of a severe or dirty wound or burn. Tdap may be given at the same time as other vaccines. Talk with your health care provider  Tell your vaccination provider if the person getting the vaccine:  Has had an allergic reaction after a previous dose of any vaccine that protects against tetanus, diphtheria, or pertussis, or has any severe, life-threatening allergies  Has had a coma, decreased level of consciousness, or prolonged seizures within 7 days after a previous dose of any pertussis vaccine (DTP, DTaP, or Tdap)  Has seizures or another nervous system problem  Has ever had Guillain-Barré Syndrome (also called \"GBS\")  Has had severe pain or swelling after a previous dose of any vaccine that protects against tetanus or diphtheria  In some cases, your health care provider may decide to postpone Tdapvaccination until a future visit. People with minor illnesses, such as a cold, may be vaccinated. People who are moderately or severely ill should usually wait until they recover beforegetting Tdap vaccine. Your health care provider can give you more information. Risks of a vaccine reaction  Pain, redness, or swelling where the shot was given, mild fever, headache, feeling tired, and nausea, vomiting, diarrhea, or stomachache sometimes happen after Tdap vaccination. People sometimes faint after medical procedures, including vaccination. Tellyour provider if you feel dizzy or have vision changes or ringing in the ears. As with any medicine, there is a very remote chance of a vaccine causing asevere allergic reaction, other serious injury, or death. What if there is a serious problem? An allergic reaction could occur after the vaccinated person leaves the clinic.  If you see signs of a severe allergic reaction (hives, swelling of the face and throat, difficulty breathing, a fast heartbeat, dizziness, or weakness), call 9-1-1 and get the person to the nearest hospital.  For other signs that concern you, call your health care provider. Adverse reactions should be reported to the Vaccine Adverse Event Reporting System (VAERS). Your health care provider will usually file this report, or you can do it yourself. Visit the VAERS website at www.vaers. [facility].gov or call 5-906.262.4201. VAERS is only for reporting reactions, and VAERS staff members do not give medical advice. The National Vaccine Injury Compensation Program  The National Vaccine Injury Compensation Program (VICP) is a federal program that was created to compensate people who may have been injured by certain vaccines. Claims regarding alleged injury or death due to vaccination have a time limit for filing, which may be as short as two years. Visit the VICP website at www.[facility].gov/vaccinecompensation or call 6-803.139.2496 to learn about the program and about filing a claim. How can I learn more? Ask your health care provider. Call your local or state health department. Visit the website of the Food and Drug Administration (FDA) for vaccine package inserts and additional information at www.fda.gov/vaccines-blood-biologics/vaccines. Contact the Centers for Disease Control and Prevention (CDC): Call 3-198.871.6473 (1-800-CDC-INFO) or  Visit CDC's website at www.cdc.gov/vaccines. Vaccine Information Statement  Tdap (Tetanus, Diphtheria, Pertussis) Vaccine  8/6/2021  42 [address] [id number]-[id number]  [facility] for Disease Control and Prevention  Many vaccine information statements are available in [place] and other languages. See www.immunize.org/vis  Hojas de información sobre vacunas están disponibles en español y en muchos otros idiomas.  Visite www.immunize.org/vis  Care instructions adapted under license by [facility] ([facility]). If you have questions about a medical condition or this instruction, always ask your healthcare professional. [place] [address] any warranty or liability for your use of this information.

## 2022-07-16 ENCOUNTER — HOSPITAL ENCOUNTER (OUTPATIENT)
Age: 22
Discharge: HOME | End: 2022-07-16
Attending: ADVANCED PRACTICE MIDWIFE | Admitting: ADVANCED PRACTICE MIDWIFE
Payer: MEDICAID

## 2022-07-16 VITALS — HEART RATE: 87 BPM | DIASTOLIC BLOOD PRESSURE: 67 MMHG | SYSTOLIC BLOOD PRESSURE: 123 MMHG | TEMPERATURE: 97.8 F

## 2022-07-16 PROBLEM — Z3A.29 29 WEEKS GESTATION OF PREGNANCY: Status: ACTIVE | Noted: 2022-07-16

## 2022-07-16 LAB
AMPHETAMINE SCREEN, URINE: NEGATIVE
BACTERIA WET PREP: NORMAL
BACTERIA: ABNORMAL /HPF
BARBITURATE SCREEN URINE: NEGATIVE
BENZODIAZEPINE SCREEN, URINE: NEGATIVE
BILIRUBIN URINE: NEGATIVE
BLOOD, URINE: ABNORMAL
BUPRENORPHINE URINE: NEGATIVE
CANNABINOID SCREEN URINE: NEGATIVE
CLARITY: ABNORMAL
CLUE CELLS: NORMAL
COCAINE METABOLITE SCREEN URINE: NEGATIVE
COLOR: YELLOW
CRYSTALS, UA: ABNORMAL /HPF
EPITHELIAL CELLS WET PREP: NORMAL
EPITHELIAL CELLS, UA: 36 /HPF (ref 0–5)
GLUCOSE URINE: NEGATIVE MG/DL
HYALINE CASTS: 7 /HPF (ref 0–8)
KETONES, URINE: NEGATIVE MG/DL
LEUKOCYTE ESTERASE, URINE: ABNORMAL
Lab: NORMAL
METHADONE SCREEN, URINE: NEGATIVE
METHAMPHETAMINE, URINE: NEGATIVE
NITRITE, URINE: NEGATIVE
OPIATE SCREEN URINE: NEGATIVE
OXYCODONE URINE: NEGATIVE
PH UA: 7 (ref 5–8)
PHENCYCLIDINE SCREEN URINE: NEGATIVE
PROPOXYPHENE SCREEN: NEGATIVE
PROTEIN UA: NEGATIVE MG/DL
RBC UA: 2 /HPF (ref 0–4)
RBC WET PREP: NORMAL
SOURCE WET PREP: NORMAL
SPECIFIC GRAVITY UA: 1.01 (ref 1–1.03)
TRICHOMONAS PREP: NORMAL
TRICYCLIC, URINE: NEGATIVE
UROBILINOGEN, URINE: 0.2 E.U./DL
WBC UA: 112 /HPF (ref 0–5)
WBC WET PREP: NORMAL
YEAST WET PREP: NORMAL

## 2022-07-16 PROCEDURE — 2580000003 HC RX 258: Performed by: ADVANCED PRACTICE MIDWIFE

## 2022-07-16 PROCEDURE — 81001 URINALYSIS AUTO W/SCOPE: CPT

## 2022-07-16 PROCEDURE — 99212 OFFICE O/P EST SF 10 MIN: CPT

## 2022-07-16 PROCEDURE — 96374 THER/PROPH/DIAG INJ IV PUSH: CPT

## 2022-07-16 PROCEDURE — 6360000002 HC RX W HCPCS: Performed by: ADVANCED PRACTICE MIDWIFE

## 2022-07-16 PROCEDURE — 80306 DRUG TEST PRSMV INSTRMNT: CPT

## 2022-07-16 RX ORDER — ONDANSETRON 2 MG/ML
4 INJECTION INTRAMUSCULAR; INTRAVENOUS EVERY 6 HOURS PRN
Status: DISCONTINUED | OUTPATIENT
Start: 2022-07-16 | End: 2022-07-16 | Stop reason: HOSPADM

## 2022-07-16 RX ORDER — ACETAMINOPHEN 325 MG/1
650 TABLET ORAL EVERY 4 HOURS PRN
Status: DISCONTINUED | OUTPATIENT
Start: 2022-07-16 | End: 2022-07-16 | Stop reason: HOSPADM

## 2022-07-16 RX ORDER — ONDANSETRON 4 MG/1
4 TABLET, ORALLY DISINTEGRATING ORAL EVERY 8 HOURS PRN
Status: DISCONTINUED | OUTPATIENT
Start: 2022-07-16 | End: 2022-07-16 | Stop reason: HOSPADM

## 2022-07-16 RX ORDER — SODIUM CHLORIDE 0.9 % (FLUSH) 0.9 %
5-40 SYRINGE (ML) INJECTION PRN
Status: DISCONTINUED | OUTPATIENT
Start: 2022-07-16 | End: 2022-07-16 | Stop reason: HOSPADM

## 2022-07-16 RX ORDER — SODIUM CHLORIDE 0.9 % (FLUSH) 0.9 %
5-40 SYRINGE (ML) INJECTION EVERY 12 HOURS SCHEDULED
Status: DISCONTINUED | OUTPATIENT
Start: 2022-07-16 | End: 2022-07-16 | Stop reason: HOSPADM

## 2022-07-16 RX ORDER — SODIUM CHLORIDE 9 MG/ML
INJECTION, SOLUTION INTRAVENOUS PRN
Status: DISCONTINUED | OUTPATIENT
Start: 2022-07-16 | End: 2022-07-16 | Stop reason: HOSPADM

## 2022-07-16 RX ADMIN — CEFTRIAXONE 1000 MG: 1 INJECTION, POWDER, FOR SOLUTION INTRAMUSCULAR; INTRAVENOUS at 09:53

## 2022-07-16 NOTE — FLOWSHEET NOTE
Glenis Prado cnm called and notified of pt arrival and complaints and given lab results. IV rocephin ordered for UTI. Will notify pt of poc.

## 2022-07-16 NOTE — FLOWSHEET NOTE
Dc instructions discussed with pt with verbalized understanding and pt to follow up in office with [facility] Monday. Efm and toco d/c from soft non tender abdomen. Pt ambulated from L&D.

## 2022-07-16 NOTE — FLOWSHEET NOTE
Pt ambulated to L&D with c/o not feeling baby move since 1400 yesterday. Pt denies lof,vaginal bleeding or ctx. Efm and toco applied to soft non tender abdomen, fht 140 with mod variabilty. Vs wnl. Call light in reach. Will monitor closely.

## 2022-07-18 ENCOUNTER — INITIAL PRENATAL (OUTPATIENT)
Dept: OBGYN CLINIC | Age: 22
End: 2022-07-18
Payer: MEDICAID

## 2022-07-18 VITALS — WEIGHT: 145 LBS | DIASTOLIC BLOOD PRESSURE: 70 MMHG | HEART RATE: 102 BPM | SYSTOLIC BLOOD PRESSURE: 106 MMHG

## 2022-07-18 DIAGNOSIS — Z34.03 ENCOUNTER FOR SUPERVISION OF NORMAL FIRST PREGNANCY IN THIRD TRIMESTER: ICD-10-CM

## 2022-07-18 DIAGNOSIS — Z76.89 ENCOUNTER TO ESTABLISH CARE: Primary | ICD-10-CM

## 2022-07-18 DIAGNOSIS — Z3A.29 29 WEEKS GESTATION OF PREGNANCY: ICD-10-CM

## 2022-07-18 PROCEDURE — 1036F TOBACCO NON-USER: CPT | Performed by: ADVANCED PRACTICE MIDWIFE

## 2022-07-18 PROCEDURE — 99213 OFFICE O/P EST LOW 20 MIN: CPT | Performed by: ADVANCED PRACTICE MIDWIFE

## 2022-07-18 PROCEDURE — G8427 DOCREV CUR MEDS BY ELIG CLIN: HCPCS | Performed by: ADVANCED PRACTICE MIDWIFE

## 2022-07-18 PROCEDURE — G8421 BMI NOT CALCULATED: HCPCS | Performed by: ADVANCED PRACTICE MIDWIFE

## 2022-07-18 NOTE — PROGRESS NOTES
CNM Prenatal Office Note  Subjective:  Chris Harrington is here for a return obstetrical visit. Today she is 29w6d weeks EGA. She is transferring her care from [facility]. She is doing well, taking her PNV as directed and has no unusual complaints. She does not have  vaginal bleeding, n/v, syncope, or headaches. Pt does feel fetal movement regularly. Objective: Mother's Prenatal Vitals  BP: 106/70  Weight: 145 lb (65.8 kg)  Heart Rate: (!) 102  Patient Position: Sitting  Prenatal Fetal Information  Fundal Height (cm): 30 cm  Fetal Heart Rate:   Movement: Absent  Pt is A&Ox3, in no acute distress. Normocephalic, atraumatic. PERRL. Resp even and non-labored. Skin pink, warm & dry. Gravid abdomen. BASHIR's well. Gait steady. Assessment:    IUP at 29w6d wks      Diagnosis Orders   1. Encounter to establish care        2. 29 weeks gestation of pregnancy        3. Encounter for supervision of normal first pregnancy in third trimester          Plan:  Pt counseled on balanced nutrition, adequate fluid intake, taking PNV daily, and exercise along with labor precautions, GHTN precautions, Kick count,  labor, and Genetic testing  Continue with routine prenatal care.  surveillance not indicated  RTC in 2 wks for prenatal visit  Zoloft now and f/u I weeks  Consider fetal echo for family hx congenital heart defect    MEDICATIONS:  Orders Placed This Encounter   Medications    sertraline (ZOLOFT) 50 MG tablet     Sig: Take 1 tablet by mouth in the morning. Dispense:  30 tablet     Refill:  5     ORDERS:  Orders Placed This Encounter   Procedures    C. Trachomatis, External Result    N. Gonorrhoeae, External Result    Hepatitis C Antibody, External Result    RPR, External Lab    Hepatitis B, External Result    HIV, External Result    Rubella Titer, External Result    Rh Factor, External Result    ABO, External Result       More than 50% of this 20 min visit was education and counseling.

## 2022-07-27 NOTE — PATIENT INSTRUCTIONS
Patient Education        Counting Your Baby's Kicks: Care Instructions  Overview     Counting your baby's kicks is one way your doctor can tell that your baby is healthy. Most women--especially in a first pregnancy--feel their baby move for the first time between 16 and 22 weeks. The movement may feel like flutters rather than kicks. Your baby may move more at certain times of the day. When you are active, you may notice less kicking than when you are resting. At yourprenatal visits, your doctor will ask whether the baby is active. In your last trimester, your doctor may ask you to count the number of timesyou feel your baby move. Follow-up care is a key part of your treatment and safety. Be sure to make and go to all appointments, and call your doctor if you are having problems. It's also a good idea to know your test results and keep alist of the medicines you take. How do you count fetal kicks? A common method of checking your baby's movement is to note the length of time it takes to count ten movements (such as kicks, flutters, or rolls). Pick your baby's most active time of day to count. This may be any time from morning to evening. If you don't feel 10 movements in an hour, have something to eat or drink and count for another hour. If you don't feel at least 10 movements in the 2-hour period, call your doctor. When should you call for help? Call your doctor now or seek immediate medical care if:    You noticed that your baby has stopped moving or is moving much less than normal.   Watch closely for changes in your health, and be sure to contact your doctor ifyou have any problems. Where can you learn more? Go to [email or web address].[email or web address] org and sign in to your [employer] account. Enter W251 in the [employer] box to learn more about \"Counting Your Baby's Kicks: Care Instructions. \"     If you do not have an account, please click on the \"Sign Up Now\" link.   Current as of: 2022               Content Version: 13.3   In[employer]onto. Care instructions adapted under license by [facility] ([facility]). If you have questions about a medical condition or this instruction, always ask your healthcare professional. [employer] any warranty or liability for your use of this information. Patient Education        Weeks 30 to 28 of Your Pregnancy: Care Instructions  Overview     You've made it to the final months of your pregnancy! By now your baby isreally starting to look like a baby, with hair and plump skin. As you enter the final weeks of pregnancy, the reality of having a baby may start to set in. This is a good time to set up a safe nursery and find quality  if needed. Doing this stuff ahead of time will allow you to focus on caring for and enjoying your new baby. You may also want to take a tour of your hospital's labor and delivery unit. This will help you get a better idea ofwhat to expect while you're in the hospital.  During these last months, be sure to take good care of yourself. Pay attention to what your body needs. If your doctor says it's okay for you to work, don'tpush yourself too hard. If you haven't already had the Tdap shot during this pregnancy, talk to your doctor about getting it. It will help protect your  against pertussisinfection. Follow-up care is a key part of your treatment and safety. Be sure to make and go to all appointments, and call your doctor if you are having problems. It's also a good idea to know your test results and keep alist of the medicines you take. How can you care for yourself at home? Pay attention to your baby's movements  You should feel your baby move several times every day. Your baby now turns less, and kicks and jabs more. Your baby sleeps 20 to 45 minutes at a time and is more active at certain times of day.   If your doctor wants you to count your baby's kicks:  Empty your bladder, and lie on your side or relax in a comfortable chair. Write down your start time. Pay attention only to your baby's movements. Count any movement except hiccups. After you have counted 10 movements, write down your stop time. Write down how many minutes it took for your baby to move 10 times. If an hour goes by and you have not recorded 10 movements, have something to eat or drink and then count for another hour. If you don't record at least 10 movements in the 2-hour period, call your doctor. Ease heartburn  Eat small, frequent meals. Do not eat chocolate, peppermint, or very spicy foods. Avoid drinks with caffeine, such as coffee, tea, and sodas. Avoid bending over or lying down after meals. Take a short walk after you eat. If heartburn is a problem at night, do not eat for 2 hours before bedtime. Take antacids like Mylanta, Maalox, Rolaids, or Tums. Do not take antacids that have sodium bicarbonate. Care for varicose veins  Varicose veins are blood vessels that stretch out with the extra blood during pregnancy. Your legs may ache or throb. Most varicose veins will go away after the birth. Avoid standing for long periods of time. Sit with your legs crossed at the ankles, not the knees. Sit with your feet propped up. Avoid tight clothing or stockings. Wear support hose. Exercise regularly. Try walking for at least 30 minutes a day. Where can you learn more? Go to https://Total Beauty MediapeTrenergi.healthClipClock. org and sign in to your [employer] account. Enter Q862 in the [facility] box to learn more about \"Weeks 30 to 32 of Your Pregnancy: Care Instructions. \"     If you do not have an account, please click on the \"Sign Up Now\" link. Current as of: February 23, 2022               Content Version: 13.3  © 3593-5317 [employer]. Care instructions adapted under license by [facility] ([facility]).  If you have questions about a medical condition or this instruction, always ask your healthcare professional. [place] [address] any warranty or liability for your use of this information.

## 2022-08-01 ENCOUNTER — ROUTINE PRENATAL (OUTPATIENT)
Dept: OBGYN CLINIC | Age: 22
End: 2022-08-01
Payer: MEDICAID

## 2022-08-01 VITALS — SYSTOLIC BLOOD PRESSURE: 119 MMHG | DIASTOLIC BLOOD PRESSURE: 76 MMHG | HEART RATE: 98 BPM | WEIGHT: 151 LBS

## 2022-08-01 DIAGNOSIS — Z34.03 ENCOUNTER FOR SUPERVISION OF NORMAL FIRST PREGNANCY IN THIRD TRIMESTER: ICD-10-CM

## 2022-08-01 DIAGNOSIS — Z3A.31 31 WEEKS GESTATION OF PREGNANCY: Primary | ICD-10-CM

## 2022-08-01 PROCEDURE — 99213 OFFICE O/P EST LOW 20 MIN: CPT | Performed by: ADVANCED PRACTICE MIDWIFE

## 2022-08-01 PROCEDURE — 1036F TOBACCO NON-USER: CPT | Performed by: ADVANCED PRACTICE MIDWIFE

## 2022-08-01 PROCEDURE — G8428 CUR MEDS NOT DOCUMENT: HCPCS | Performed by: ADVANCED PRACTICE MIDWIFE

## 2022-08-01 PROCEDURE — G8421 BMI NOT CALCULATED: HCPCS | Performed by: ADVANCED PRACTICE MIDWIFE

## 2022-08-01 NOTE — PROGRESS NOTES
Patient presents today for routine prenatal care. Pt denies any bleeding or contractions. + Fetal movement. Pt states had clear,water like vaginal discharge when getting out of the shower and states it wasn't urine. Also when wiping later had the water like discharge. Pt states she has decided not to take zoloft until after delivery because of side effects she has read.

## 2022-08-01 NOTE — PROGRESS NOTES
CNM Prenatal Office Note  Subjective:  Jordon Hope is here for a return obstetrical visit. Today she is 31w6d weeks EGA. She is doing well, taking her PNV as directed and has no unusual complaints. She does not have  vaginal bleeding, n/v, syncope, or headaches. Pt does feel fetal movement regularly. Objective: Mother's Prenatal Vitals  BP: 119/76  Weight: 151 lb (68.5 kg)  Heart Rate: 98  Patient Position: Sitting  Prenatal Fetal Information  Fundal Height (cm): 32 cm  Fetal HR: 132  Movement: Present  Pt is A&Ox3, in no acute distress. Normocephalic, atraumatic. PERRL. Resp even and non-labored. Skin pink, warm & dry. Gravid abdomen. BASHIR's well. Gait steady. Assessment:    IUP at 31w6d wks      Diagnosis Orders   1. 31 weeks gestation of pregnancy        2. Encounter for supervision of normal first pregnancy in third trimester          Plan:  Pt counseled on balanced nutrition, adequate fluid intake, taking PNV daily, and exercise along with GHTN precautions, Kick count, and  labor  Continue with routine prenatal care.  surveillance not indicated  RTC in 2 wks for prenatal visit    MEDICATIONS:  No orders of the defined types were placed in this encounter. ORDERS:  No orders of the defined types were placed in this encounter. More than 50% of this 20 min visit was education and counseling.

## 2022-08-15 ENCOUNTER — ROUTINE PRENATAL (OUTPATIENT)
Dept: OBGYN CLINIC | Age: 22
End: 2022-08-15
Payer: MEDICAID

## 2022-08-15 VITALS — SYSTOLIC BLOOD PRESSURE: 118 MMHG | WEIGHT: 151 LBS | DIASTOLIC BLOOD PRESSURE: 78 MMHG | HEART RATE: 103 BPM

## 2022-08-15 DIAGNOSIS — Z34.03 ENCOUNTER FOR SUPERVISION OF NORMAL FIRST PREGNANCY IN THIRD TRIMESTER: ICD-10-CM

## 2022-08-15 DIAGNOSIS — Z3A.33 33 WEEKS GESTATION OF PREGNANCY: Primary | ICD-10-CM

## 2022-08-15 PROCEDURE — 1036F TOBACCO NON-USER: CPT | Performed by: ADVANCED PRACTICE MIDWIFE

## 2022-08-15 PROCEDURE — G8427 DOCREV CUR MEDS BY ELIG CLIN: HCPCS | Performed by: ADVANCED PRACTICE MIDWIFE

## 2022-08-15 PROCEDURE — 99213 OFFICE O/P EST LOW 20 MIN: CPT | Performed by: ADVANCED PRACTICE MIDWIFE

## 2022-08-15 PROCEDURE — G8421 BMI NOT CALCULATED: HCPCS | Performed by: ADVANCED PRACTICE MIDWIFE

## 2022-08-15 NOTE — PROGRESS NOTES
Patient presents today for routine prenatal care. Pt denies any vaginal leaking bleeding or contractions. + Fetal movement. She thinks she has been having a lot of annette olsen, through her back and stomach too. Lot of discomfort d/t growing. Her stomach has been getting tight. Most of the time painful too.

## 2022-08-15 NOTE — PROGRESS NOTES
CNM Prenatal Office Note  Subjective:  John Lemons is here for a return obstetrical visit. Today she is 33w6d weeks EGA. She is doing well, taking her PNV as directed and has no unusual complaints. She does not have  vaginal bleeding, n/v, syncope, or headaches. Pt does feel fetal movement regularly. Objective: Mother's Prenatal Vitals  BP: 118/78  Weight: 151 lb (68.5 kg)  Heart Rate: (!) 103  Patient Position: Sitting  Prenatal Fetal Information  Fetal HR: 143  Movement: Present  Pt is A&Ox3, in no acute distress. Normocephalic, atraumatic. PERRL. Resp even and non-labored. Skin pink, warm & dry. Gravid abdomen. BASHIR's well. Gait steady. Assessment:    IUP at 33w6d wks      Diagnosis Orders   1. 33 weeks gestation of pregnancy        2. Encounter for supervision of normal first pregnancy in third trimester          Plan:   Pt counseled on balanced nutrition, adequate fluid intake, taking PNV daily, and exercise along with GHTN precautions, Kick count and  labor   Continue with routine prenatal care.   surveillance not indicated   RTC in 2 wks for prenatal visit    MEDICATIONS:  No orders of the defined types were placed in this encounter. ORDERS:  No orders of the defined types were placed in this encounter. More than 50% of this 20 min visit was education and counseling.

## 2022-08-15 NOTE — PATIENT INSTRUCTIONS
Patient Education        Counting Your Baby's Kicks: Care Instructions  Overview     Counting your baby's kicks is one way your doctor can tell that your baby is healthy. Most women--especially in a first pregnancy--feel their baby move for the first time between 16 and 22 weeks. The movement may feel like flutters rather than kicks. Your baby may move more at certain times of the day. When you are active, you may notice less kicking than when you are resting. At yourprenatal visits, your doctor will ask whether the baby is active. In your last trimester, your doctor may ask you to count the number of timesyou feel your baby move. Follow-up care is a key part of your treatment and safety. Be sure to make and go to all appointments, and call your doctor if you are having problems. It's also a good idea to know your test results and keep alist of the medicines you take. How do you count fetal kicks? A common method of checking your baby's movement is to note the length of time it takes to count ten movements (such as kicks, flutters, or rolls). Pick your baby's most active time of day to count. This may be any time from morning to evening. If you don't feel 10 movements in an hour, have something to eat or drink and count for another hour. If you don't feel at least 10 movements in the 2-hour period, call your doctor. When should you call for help? Call your doctor now or seek immediate medical care if:    You noticed that your baby has stopped moving or is moving much less than normal.   Watch closely for changes in your health, and be sure to contact your doctor ifyou have any problems. Where can you learn more? Go to https://CalStar Productspenikkieewjoelle.LoyalBlocks. org and sign in to your NetMovies account. Enter F407 in the [employer] box to learn more about \"Counting Your Baby's Kicks: Care Instructions. \"     If you do not have an account, please click on the \"Sign Up Now\" link.   Current as of: 2022               Content Version: 13.3   HubHuman. Care instructions adapted under license by [facility] ([facility]). If you have questions about a medical condition or this instruction, always ask your healthcare professional. [employer] any warranty or liability for your use of this information. Patient Education        Weeks 34 to 39 of Your Pregnancy: Care Instructions  Overview     By now, your baby and your belly have grown quite large. It's almost time to give birth! Your baby's lungs are almost ready to breathe air. The skull bones are firm enough to protect your baby's head, but soft enough to move downthrough the birth canal.  You may be feeling excited and happy at times--but also anxious or scared. You might wonder how you'll know if you're in labor or what to expect during labor. Try to be open and flexible in your expectations of the birth. Because each birth is different, there's no way to know exactly what childbirth will be likefor you. Talk to your doctor or midwife about any concerns you have. If you haven't already had the Tdap shot during this pregnancy, talk to your doctor about getting it. It will help protect your  against pertussisinfection. In the 36th week, you'll probably have a test for group B streptococcus (GBS). GBS is a common type of bacteria that can live in the vagina and rectum. It can make your baby sick after birth. If you test positive, you will get antibioticsduring labor. The medicine will help keep your baby from getting the bacteria. Follow-up care is a key part of your treatment and safety. Be sure to make and go to all appointments, and call your doctor if you are having problems. It's also a good idea to know your test results and keep alist of the medicines you take. How can you care for yourself at home? Learn about pain relief choices  Pain is different for everyone.  Talk with your doctor about your feelings about pain. You can choose from several types of pain relief. These include medicine, breathing techniques, and comfort measures. You can use more than one option. If you choose to have pain medicine during labor, talk to your doctor about your options. Some medicines lower anxiety and help with some of the pain. Others make your lower body numb so that you won't feel pain. Be sure to tell your doctor about your pain medicine choice before you start labor or very early in your labor. You may be able to change your mind as labor progresses. Labor and delivery  The first stage of labor has three parts: early, active, and transition. It's common to have early labor at home. You can stay busy or rest, eat light snacks, drink clear fluids, and start counting contractions. When talking during a contraction gets hard, you may be moving to active labor. During active labor, you should head for the hospital if you aren't there already. You are in active labor when contractions come every 3 to 4 minutes and last about 60 seconds. Your cervix is opening more rapidly. If your water breaks, contractions will come faster and stronger. During transition, your cervix is stretching, and contractions are coming more rapidly. You may want to push, but your cervix might not be ready. Your doctor will tell you when to push. The second stage starts when your cervix is completely opened and you are ready to push. Contractions are very strong to push the baby down the birth canal.  You will probably feel the urge to push. You may feel like you need to have a bowel movement. You may be coached to push with contractions. These contractions will be very strong, but you won't have them as often. You can get a little rest between contractions. One last push, and your baby is born. The third stage is when a few more contractions push out the placenta. This may take 30 minutes or less.   Where can you learn more?  Go to [email or web address] [email or web address] org and sign in to your [employer] account. Enter O359 in the [facility] box to learn more about \"Weeks 34 to 36 of Your Pregnancy: Care Instructions. \"     If you do not have an account, please click on the \"Sign Up Now\" link. Current as of: 2022               Content Version: 13.3   [employer]. Care instructions adapted under license by [facility] ([facility]). If you have questions about a medical condition or this instruction, always ask your healthcare professional. [place] [address] any warranty or liability for your use of this information. Patient Education        Group B Strep During Pregnancy: Care Instructions  Overview     Group B strep infection is caused by a type of bacteria. It's a different kindof bacteria than the kind that causes strep throat. You may have this kind of bacteria in your body. Sometimes it may cause an infection, but most of the time it doesn't make you sick or cause symptoms. But if you pass the bacteria to your baby during the birth, it can cause serioushealth problems for your baby. If you have this bacteria in your body, you will get antibiotics when you arein labor. Antibiotics help prevent problems for a  baby. After birth, doctors will watch and may test your baby. If your baby testspositive for Group B strep, your baby will get antibiotics. If you plan to breastfeed your baby, don't worry. It will be safe to breastfeed. Follow-up care is a key part of your treatment and safety. Be sure to make and go to all appointments, and call your doctor if you are having problems. It's also a good idea to know your test results and keep alist of the medicines you take. How can you care for yourself at home? If your doctor has prescribed antibiotics, take them as directed. Do not stop taking them just because you feel better.  You need to take the full course of excellent eating habits, your doctor may recommend a multivitamin to make sure you get enoughiron and folic acid. You may wonder how much weight you should gain. In general, if you were at a healthy weight before you became pregnant, then you should gain between 25 and 35 pounds. If you were overweight before pregnancy, then you'll likely be advised to gain 15 to 25 pounds. If you were underweight before pregnancy, then you'll probably be advised to gain 28 to 40 pounds. Your doctor will work with you to set a weight goal that is right for you. Gaining a healthy amount ofweight helps you have a healthy baby. Follow-up care is a key part of your treatment and safety. Be sure to make and go to all appointments, and call your doctor if you are having problems. It's also a good idea to know your test results and keep alist of the medicines you take. How can you care for yourself at home? Eat plenty of fruits and vegetables. Include a variety of orange, yellow, and leafy dark-green vegetables every day. Choose whole-grain bread, cereal, and pasta. Good choices include whole wheat bread, whole wheat pasta, brown rice, and oatmeal.  Get 4 or more servings of milk and milk products each day. Good choices include nonfat or low-fat milk, yogurt, and cheese. If you cannot eat milk products, you can get calcium from calcium-fortified products such as orange juice, soy milk, and tofu. Other non-milk sources of calcium include leafy green vegetables, such as broccoli, kale, mustard greens, turnip greens, bok keerthi, and brussels sprouts. If you eat meat, pick lower-fat types. Good choices include lean cuts of meat and chicken or turkey without the skin. Do not eat shark, swordfish, nikhil mackerel, or tilefish. They have high levels of mercury, which is dangerous to your baby. You can eat up to 12 ounces a week of fish or shellfish that have low mercury levels. Good choices include shrimp, wild salmon, pollock, and catfish. Limit some other types of fish, such as white (albacore) tuna, to 4 oz (0.1 kg) a week. Heat lunch meats (such as turkey, ham, or bologna) to 165°F before you eat them. This reduces your risk of getting sick from a kind of bacteria that can be found in lunch meats. Do not eat unpasteurized soft cheeses, such as brie, feta, fresh mozzarella, and blue cheese. They have a bacteria that could harm your baby. Limit caffeine. If you drink coffee or tea, have no more than 1 cup a day. Caffeine is also found in corey. Do not drink any alcohol. No amount of alcohol has been found to be safe during pregnancy. Do not diet or try to lose weight. For example, do not follow a low-carbohydrate diet. If you are overweight at the start of your pregnancy, your doctor will work with you to manage your weight gain. Tell your doctor about all vitamins and supplements you take. When should you call for help? Watch closely for changes in your health, and be sure to contact your doctor if you have any problems. Where can you learn more? Go to https://Buyapowapepiceweb.YoungCurrent. org and sign in to your Zagster account. Enter Y785 in the [employer] box to learn more about \"Nutrition During Pregnancy: Care Instructions. \"     If you do not have an account, please click on the \"Sign Up Now\" link. Current as of: 2021               Content Version: 13.3   GroupMe. Care instructions adapted under license by [facility] ([facility]). If you have questions about a medical condition or this instruction, always ask your healthcare professional. [place] [address] any warranty or liability for your use of this information. Patient Education        Pregnancy Precautions: Care Instructions  Your Care Instructions     There is no sure way to prevent labor before your due date ( labor) or to prevent most other pregnancy problems.  But there are things you can do to increase your chances of a healthy pregnancy. Go to your appointments, follow your doctor's advice, and take good care of yourself. Eat well, and exercise(if your doctor agrees). And make sure to drink plenty of water. Follow-up care is a key part of your treatment and safety. Be sure to make and go to all appointments, and call your doctor if you are having problems. It's also a good idea to know your test results and keep alist of the medicines you take. How can you care for yourself at home? Make sure you go to your prenatal appointments. At each visit, your doctor will check your blood pressure. Your doctor will also check to see if you have protein in your urine. High blood pressure and protein in urine are signs of preeclampsia. This condition can be dangerous for you and your baby. Drink plenty of fluids. Dehydration can cause contractions. If you have kidney, heart, or liver disease and have to limit fluids, talk with your doctor before you increase the amount of fluids you drink. Tell your doctor right away if you notice any symptoms of an infection, such as:  Burning when you urinate. A foul-smelling discharge from your vagina. Vaginal itching. Unexplained fever. Unusual pain or soreness in your uterus or lower belly. Eat a balanced diet. Include plenty of foods that are high in calcium and iron. Foods high in calcium include milk, cheese, yogurt, almonds, and broccoli. Foods high in iron include red meat, shellfish, poultry, eggs, beans, raisins, whole-grain bread, and leafy green vegetables. Do not smoke. If you need help quitting, talk to your doctor about stop-smoking programs and medicines. These can increase your chances of quitting for good. Do not drink alcohol or use marijuana or illegal drugs. Follow your doctor's directions about activity. Your doctor will let you know how much, if any, exercise you can do. Ask your doctor if you can have sex.  If you are at risk for early labor, your doctor may ask you to not have sex. Take care to prevent falls. During pregnancy, your joints are loose, and your balance is off. Sports such as bicycling, skiing, or in-line skating can increase your risk of falling. And don't ride horses or motorcycles, dive, water ski, scuba dive, or parachute jump while you are pregnant. Avoid things that can make your body too hot and may be harmful to your baby, such as a hot tub or sauna. Or talk with your doctor before doing anything that raises your body temperature. Your doctor can tell you if it's safe. Do not take any over-the-counter or herbal medicines or supplements without talking to your doctor or pharmacist first.  When should you call for help? Call 911  anytime you think you may need emergency care. For example, call if:    You passed out (lost consciousness). You have a seizure. You have severe vaginal bleeding. You have severe pain in your belly or pelvis. You have had fluid gushing or leaking from your vagina and you know or think the umbilical cord is bulging into your vagina. If this happens, immediately get down on your knees so your rear end (buttocks) is higher than your head. This will decrease the pressure on the cord until help arrives. Call your doctor now or seek immediate medical care if:    You have signs of preeclampsia, such as:  Sudden swelling of your face, hands, or feet. New vision problems (such as dimness, blurring, or seeing spots). A severe headache. You have any vaginal bleeding. You have belly pain or cramping. You have a fever. You have had regular contractions (with or without pain) for an hour. This means that you have 8 or more within 1 hour or 4 or more in 20 minutes after you change your position and drink fluids. You have a sudden release of fluid from your vagina. You have low back pain or pelvic pressure that does not go away.      You notice that your baby has stopped moving or is moving much less than normal.   Watch closely for changes in your health, and be sure to contact your doctor ifyou have any problems. Where can you learn more? Go to https://chpebernie.healthSurveySnap. org and sign in to your [employer] account. Enter [id number] in the [facility] box to learn more about \"Pregnancy Precautions: Care Instructions. \"     If you do not have an account, please click on the \"Sign Up Now\" link. Current as of: February 23, 2022               Content Version: 13.3  © 2006-2022 icomasoft. Care instructions adapted under license by [facility] ([facility]). If you have questions about a medical condition or this instruction, always ask your healthcare professional. [place] [address] any warranty or liability for your use of this information. Patient Education        During Pregnancy: Exercises  Introduction  Here are some examples of exercises to do during your pregnancy. Start eachexercise slowly. Ease off the exercise if you start to have pain. Your doctor or physical therapist will tell you when you can start theseexercises and which ones will work best for you. How to do the exercises  Neck rotation    Sit in a firm chair, or stand up straight. Keeping your chin level, turn your head to the right, and hold for 15 to 30 seconds. Turn your head to the left and hold for 15 to 30 seconds. Repeat 2 to 4 times to each side. Forward neck flexion    Sit in a firm chair, or stand up straight. Bend your head forward. Hold for 15 to 30 seconds. Repeat 2 to 4 times. Back press    Place your feet 10 to 12 inches from the wall. Rest your back flat against the wall and slide down the wall until your knees are slightly bent. Press your lower back against the wall by pulling in your stomach muscles. Hold for 6 seconds, and then relax your stomach muscles and slide back up the wall. Repeat 8 to 12 times.   Full body twist    Sit with your legs crossed. Reach your left hand toward your right foot, and place your right hand at your side for support. Slowly twist your torso to your right. Switch your hands and twist to your left. Repeat 2 to 4 times. Pelvic rocking    Kneeling on hands and knees, place your hands directly under your shoulders and your knees under your hips. Breathe in deeply. Tuck your head downward and round your back up, making a curve with your back in the shape of the letter C. Hold this position for a count of 6. Breathe out slowly and bring your head back up. Relax, keeping your back straight (don't allow it to curve toward the floor). Hold this for a count of 6. Do this exercise 8 times or to your comfort level. Pelvic tilt    This exercise strengthens your lower back and pelvis. It is for use during the first 4 months of pregnancy. After this point, lying on your back is notrecommended, because it can cause blood flow problems for you and your baby. Lie on your back. Keep your knees relaxed. Tighten your belly and buttocks muscles. At the same time, gently shift your pelvis upward. This should flatten the curve in your back. Hold for 6 seconds and then relax. Gradually increase the number of tilts you do each day, to your comfort level. Backward stretch    Kneel on hands and knees with your knees 8 to 10 inches apart, hands directly under your shoulders, and arms and back straight. Keeping your arms straight, slowly lower your buttocks toward your heels and tuck your head toward your knees. Hold for 15 to 30 seconds. Slowly return to the kneeling position. Repeat 2 to 4 times. Forward bend    Sit comfortably in a chair, with your arms relaxed. Slowly bend forward, allowing your arms to hang down in front of you. Lean only as far as you can without feeling discomfort or pressure on your belly. Hold for 15 to 30 seconds and then slowly sit up straight. Repeat 2 to 4 times or to your comfort level.   Leg lift crawl    Kneeling on hands and knees, place your hands directly under your shoulders and straighten your arms. Tighten your belly muscles by pulling in your belly button toward your spine. Be sure you continue to breathe normally and do not hold your breath. Lift your left knee and bring it toward your elbow. Slowly extend your leg behind you without completely straightening it. Be careful not to let your hip drop down. Avoid arching your back. Hold your leg behind you for about 6 seconds. Return to your starting position. Do the same exercise with your other leg. Repeat 8 to 12 times for each leg. Tailor sitting    Sit on the floor. Bring your feet close to your body while crossing your ankles. Hold this position for as long as you are comfortable. Tailor stretching    Sit on the floor with your back straight, legs about 12 inches apart, and feet relaxed outward. Stretch your hands forward toward your left foot, then sit up. Stretch your hands straight forward, then sit up. Stretch your hands forward toward your right foot, then sit up. Hold each stretch for 15 to 30 seconds. Repeat 2 to 4 times. Follow-up care is a key part of your treatment and safety. Be sure to make and go to all appointments, and call your doctor if you are having problems. It's also a good idea to know your test results and keep alist of the medicines you take. Where can you learn more? Go to https://Skyonicpevicentaeb.SCVNGR. org and sign in to your [employer] account. Enter K220 in the [employer] box to learn more about \"During Pregnancy: Exercises. \"     If you do not have an account, please click on the \"Sign Up Now\" link. Current as of: February 23, 2022               Content Version: 13.3  © 8959-4343 Healthwise, Incorporated. Care instructions adapted under license by [facility] ([facility]).  If you have questions about a medical condition or this instruction, always ask your healthcare professional. [employer] disclaims any warranty or liability for your use of this information.

## 2022-08-29 ENCOUNTER — PATIENT OUTREACH (OUTPATIENT)
Dept: LABOR AND DELIVERY | Facility: HOSPITAL | Age: 22
End: 2022-08-29

## 2022-08-29 ENCOUNTER — ROUTINE PRENATAL (OUTPATIENT)
Dept: OBGYN CLINIC | Age: 22
End: 2022-08-29
Payer: MEDICAID

## 2022-08-29 VITALS — DIASTOLIC BLOOD PRESSURE: 76 MMHG | HEART RATE: 81 BPM | WEIGHT: 161 LBS | SYSTOLIC BLOOD PRESSURE: 118 MMHG

## 2022-08-29 DIAGNOSIS — Z36.85 ENCOUNTER FOR ANTENATAL SCREENING FOR STREPTOCOCCUS B: ICD-10-CM

## 2022-08-29 DIAGNOSIS — Z34.03 ENCOUNTER FOR SUPERVISION OF NORMAL FIRST PREGNANCY IN THIRD TRIMESTER: Primary | ICD-10-CM

## 2022-08-29 DIAGNOSIS — Z3A.35 35 WEEKS GESTATION OF PREGNANCY: ICD-10-CM

## 2022-08-29 PROCEDURE — 99213 OFFICE O/P EST LOW 20 MIN: CPT | Performed by: ADVANCED PRACTICE MIDWIFE

## 2022-08-29 PROCEDURE — 1036F TOBACCO NON-USER: CPT | Performed by: ADVANCED PRACTICE MIDWIFE

## 2022-08-29 PROCEDURE — G8427 DOCREV CUR MEDS BY ELIG CLIN: HCPCS | Performed by: ADVANCED PRACTICE MIDWIFE

## 2022-08-29 PROCEDURE — G8421 BMI NOT CALCULATED: HCPCS | Performed by: ADVANCED PRACTICE MIDWIFE

## 2022-08-29 NOTE — PROGRESS NOTES
CNM Prenatal Office Note  Subjective:  Yolanda Lewis is here for a return obstetrical visit. Today she is 35w6d weeks EGA. She is doing well, taking her PNV as directed and has no unusual complaints. She does not havevaginal bleeding, n/v, syncope, or headaches. Pt does feel fetal movement regularly. Objective: Mother's Prenatal Vitals  BP: 118/76  Weight: 161 lb (73 kg)  Heart Rate: 81  Patient Position: Sitting  Prenatal Fetal Information  Fundal Height (cm): 36 cm  Fetal HR:   Movement: Present  Cervical Exam  Dilation (cm): 1  Effacement: 50  Station: -3  Station (Labor Curve Graph): 8  Presentation: Vertex  Dil/Eff/Sta  Dilation (cm): 1  Effacement: 50  Station: -3  Pt is A&Ox3, in no acute distress. Normocephalic, atraumatic. PERRL. Resp even and non-labored. Skin pink, warm & dry. Gravid abdomen. BASHIR's well. Gait steady. Assessment:    IUP at 35w6d wks      Diagnosis Orders   1. Encounter for supervision of normal first pregnancy in third trimester        2. 35 weeks gestation of pregnancy        3. Encounter for  screening for Streptococcus B  Culture, Strep B Screen, Vaginal/Rectal        Plan:   GBS collected and labor education completed. Encourage perineal massage. Discussed pain management options during labor and birth plan. Pt counseled on counseled on balanced nutrition, adequate fluid intake, taking PNV daily, and exercise along with GHTN precautions, Kick count, and  labor  Continue with routine prenatal care.  surveillance not indicated  RTC in 1 wks for prenatal visit    MEDICATIONS:  No orders of the defined types were placed in this encounter. ORDERS:  Orders Placed This Encounter   Procedures    Culture, Strep B Screen, Vaginal/Rectal         More than 50% of this 20 min visit was education and counseling.     ORDERS:  Orders Placed This Encounter   Procedures    Culture, Strep B Screen, Vaginal/Rectal

## 2022-08-29 NOTE — OUTREACH NOTE
[facility]  Check-In    Current Estimated Gestational Age: 35w6d    Questions/Answers    Flowsheet Row Responses   Best Method for Contacting Cell   Able to keep appointments as scheduled No  [client states she has transferred her care to [facility] and is seeing Anna Olivas the midwife]          Program closed    Shruti Asencio RN  Maternity Nurse Navigator    8/29/2022, 10:44 CDT

## 2022-08-29 NOTE — PATIENT INSTRUCTIONS
Patient Education        Counting Your Baby's Kicks: Care Instructions  Overview     Counting your baby's kicks is one way your doctor can tell that your baby is healthy. Most women--especially in a first pregnancy--feel their baby move for the first time between 16 and 22 weeks. The movement may feel like flutters rather than kicks. Your baby may move more at certain times of the day. When you are active, you may notice less kicking than when you are resting. At yourprenatal visits, your doctor will ask whether the baby is active. In your last trimester, your doctor may ask you to count the number of timesyou feel your baby move. Follow-up care is a key part of your treatment and safety. Be sure to make and go to all appointments, and call your doctor if you are having problems. It's also a good idea to know your test results and keep alist of the medicines you take. How do you count fetal kicks? A common method of checking your baby's movement is to note the length of time it takes to count ten movements (such as kicks, flutters, or rolls). Pick your baby's most active time of day to count. This may be any time from morning to evening. If you don't feel 10 movements in an hour, have something to eat or drink and count for another hour. If you don't feel at least 10 movements in the 2-hour period, call your doctor. When should you call for help? Call your doctor now or seek immediate medical care if:    You noticed that your baby has stopped moving or is moving much less than normal.   Watch closely for changes in your health, and be sure to contact your doctor ifyou have any problems. Where can you learn more? Go to https://Sprigqiana.Exhbit. org and sign in to your Nuvyyo account. Enter K141 in the Kaybus box to learn more about \"Counting Your Baby's Kicks: Care Instructions. \"     If you do not have an account, please click on the \"Sign Up Now\" link.   Current as of: 2022               Content Version: 13.3   [employer]. Care instructions adapted under license by [facility] ([facility]). If you have questions about a medical condition or this instruction, always ask your healthcare professional. [address] any warranty or liability for your use of this information. Patient Education        Group B Strep During Pregnancy: Care Instructions  Overview     Group B strep infection is caused by a type of bacteria. It's a different kindof bacteria than the kind that causes strep throat. You may have this kind of bacteria in your body. Sometimes it may cause an infection, but most of the time it doesn't make you sick or cause symptoms. But if you pass the bacteria to your baby during the birth, it can cause serioushealth problems for your baby. If you have this bacteria in your body, you will get antibiotics when you arein labor. Antibiotics help prevent problems for a  baby. After birth, doctors will watch and may test your baby. If your baby testspositive for Group B strep, your baby will get antibiotics. If you plan to breastfeed your baby, don't worry. It will be safe to breastfeed. Follow-up care is a key part of your treatment and safety. Be sure to make and go to all appointments, and call your doctor if you are having problems. It's also a good idea to know your test results and keep alist of the medicines you take. How can you care for yourself at home? If your doctor has prescribed antibiotics, take them as directed. Do not stop taking them just because you feel better. You need to take the full course of antibiotics. Tell your doctor if you are allergic to any antibiotic. If you go into labor, or your water breaks, go to the hospital. Your doctor will give you antibiotics to help protect your baby from infection. Tell the doctors and nurses if you have an allergy to penicillin.   Tell the doctors and nurses at the hospital that you tested positive for group B strep. When should you call for help? Call your doctor now or seek immediate medical care if:    You have symptoms of a urinary tract infection. These may include:  Pain or burning when you urinate. A frequent need to urinate without being able to pass much urine. Pain in the flank, which is just below the rib cage and above the waist on either side of the back. Blood in your urine. A fever. You think you are in labor or your water has broken. You have pain in your belly or pelvis. Watch closely for changes in your health, and be sure to contact your doctor ifyou have any problems. Where can you learn more? Go to https://PhonologicspeOcarina Technologieseb.Dhaani Systems. org and sign in to your [employer] account. Enter M001 in the localbacon box to learn more about \"Group B Strep During Pregnancy: Care Instructions. \"     If you do not have an account, please click on the \"Sign Up Now\" link. Current as of: February 23, 2022               Content Version: 13.3  © 2006-2022 [employer]. Care instructions adapted under license by [facility] ([facility]). If you have questions about a medical condition or this instruction, always ask your healthcare professional. [place] [address] any warranty or liability for your use of this information. Patient Education        Weeks 34 to 39 of Your Pregnancy: Care Instructions  Overview     By now, your baby and your belly have grown quite large. It's almost time to give birth! Your baby's lungs are almost ready to breathe air. The skull bones are firm enough to protect your baby's head, but soft enough to move downthrough the birth canal.  You may be feeling excited and happy at times--but also anxious or scared. You might wonder how you'll know if you're in labor or what to expect during labor. Try to be open and flexible in your expectations of the birth.  Because each birth is different, there's no way to know exactly what childbirth will be likefor you. Talk to your doctor or midwife about any concerns you have. If you haven't already had the Tdap shot during this pregnancy, talk to your doctor about getting it. It will help protect your  against pertussisinfection. In the 36th week, you'll probably have a test for group B streptococcus (GBS). GBS is a common type of bacteria that can live in the vagina and rectum. It can make your baby sick after birth. If you test positive, you will get antibioticsduring labor. The medicine will help keep your baby from getting the bacteria. Follow-up care is a key part of your treatment and safety. Be sure to make and go to all appointments, and call your doctor if you are having problems. It's also a good idea to know your test results and keep alist of the medicines you take. How can you care for yourself at home? Learn about pain relief choices  Pain is different for everyone. Talk with your doctor about your feelings about pain. You can choose from several types of pain relief. These include medicine, breathing techniques, and comfort measures. You can use more than one option. If you choose to have pain medicine during labor, talk to your doctor about your options. Some medicines lower anxiety and help with some of the pain. Others make your lower body numb so that you won't feel pain. Be sure to tell your doctor about your pain medicine choice before you start labor or very early in your labor. You may be able to change your mind as labor progresses. Labor and delivery  The first stage of labor has three parts: early, active, and transition. It's common to have early labor at home. You can stay busy or rest, eat light snacks, drink clear fluids, and start counting contractions. When talking during a contraction gets hard, you may be moving to active labor.  During active labor, you should head for the hospital if you aren't there already. You are in active labor when contractions come every 3 to 4 minutes and last about 60 seconds. Your cervix is opening more rapidly. If your water breaks, contractions will come faster and stronger. During transition, your cervix is stretching, and contractions are coming more rapidly. You may want to push, but your cervix might not be ready. Your doctor will tell you when to push. The second stage starts when your cervix is completely opened and you are ready to push. Contractions are very strong to push the baby down the birth canal.  You will probably feel the urge to push. You may feel like you need to have a bowel movement. You may be coached to push with contractions. These contractions will be very strong, but you won't have them as often. You can get a little rest between contractions. One last push, and your baby is born. The third stage is when a few more contractions push out the placenta. This may take 30 minutes or less. Where can you learn more? Go to https://AlaipeThinktwice.healthmorphCARD. org and sign in to your [employer] account. Enter A474 in the NeuString box to learn more about \"Weeks 34 to 36 of Your Pregnancy: Care Instructions. \"     If you do not have an account, please click on the \"Sign Up Now\" link. Current as of: February 23, 2022               Content Version: 13.3  © 2387-1977 [employer]. Care instructions adapted under license by [facility] ([facility]). If you have questions about a medical condition or this instruction, always ask your healthcare professional. [place] [address] any warranty or liability for your use of this information.

## 2022-08-29 NOTE — PROGRESS NOTES
Patient presents today for routine prenatal care. Pt denies any vaginal leaking or bleeding. + Fetal movement. GBS today. Patient reports contractions the other night, had some the other night that started @ 10:30 pm and lasted until she fell asleep. Then the other day she felt all kinds of pressure and felt like bladder was full but she had already emptied it.

## 2022-08-30 LAB — STREP B DNA AMP: DETECTED

## 2022-09-06 NOTE — PATIENT INSTRUCTIONS
2022               Content Version: 13.3   Pipit Interactive. Care instructions adapted under license by [facility] ([facility]). If you have questions about a medical condition or this instruction, always ask your healthcare professional. [address] any warranty or liability for your use of this information. Patient Education        Group B Strep During Pregnancy: Care Instructions  Overview     Group B strep infection is caused by a type of bacteria. It's a different kindof bacteria than the kind that causes strep throat. You may have this kind of bacteria in your body. Sometimes it may cause an infection, but most of the time it doesn't make you sick or cause symptoms. But if you pass the bacteria to your baby during the birth, it can cause serioushealth problems for your baby. If you have this bacteria in your body, you will get antibiotics when you arein labor. Antibiotics help prevent problems for a  baby. After birth, doctors will watch and may test your baby. If your baby testspositive for Group B strep, your baby will get antibiotics. If you plan to breastfeed your baby, don't worry. It will be safe to breastfeed. Follow-up care is a key part of your treatment and safety. Be sure to make and go to all appointments, and call your doctor if you are having problems. It's also a good idea to know your test results and keep alist of the medicines you take. How can you care for yourself at home? If your doctor has prescribed antibiotics, take them as directed. Do not stop taking them just because you feel better. You need to take the full course of antibiotics. Tell your doctor if you are allergic to any antibiotic. If you go into labor, or your water breaks, go to the hospital. Your doctor will give you antibiotics to help protect your baby from infection. Tell the doctors and nurses if you have an allergy to penicillin.   Tell the doctors and nurses at the hospital that you tested positive for group B strep. When should you call for help? Call your doctor now or seek immediate medical care if:    You have symptoms of a urinary tract infection. These may include:  Pain or burning when you urinate. A frequent need to urinate without being able to pass much urine. Pain in the flank, which is just below the rib cage and above the waist on either side of the back. Blood in your urine. A fever. You think you are in labor or your water has broken. You have pain in your belly or pelvis. Watch closely for changes in your health, and be sure to contact your doctor ifyou have any problems. Where can you learn more? Go to https://Softlanding LabspeAnelletti Sicilian Street Food Restaurantseweb.Inge Watertechnologies. org and sign in to your Fry Multimedia account. Enter M001 in the Avvo box to learn more about \"Group B Strep During Pregnancy: Care Instructions. \"     If you do not have an account, please click on the \"Sign Up Now\" link. Current as of: February 23, 2022               Content Version: 13.3  © 2006-2022 Aibo. Care instructions adapted under license by [facility] ([facility]). If you have questions about a medical condition or this instruction, always ask your healthcare professional. [place] [address] any warranty or liability for your use of this information. Patient Education        Week 40 of Your Pregnancy: Care Instructions  Overview     You are near the end of your pregnancy--and you're probably pretty uncomfortable. It may be harder to walk around. Lying down probably isn'tcomfortable either. You may have trouble getting to sleep or staying asleep. Most babies are born between 40 and 41 weeks. This is a good time to think about packing a bag for the hospital with items you'll need. Then you'll beready when labor starts. Follow-up care is a key part of your treatment and safety.  Be sure to make and go to all appointments, and call your off.  Your baby's first dark stool is called meconium. After the meconium is passed, your baby will develop their own bowel pattern. Some babies, especially  babies, have several bowel movements a day. Others have one or two a day, or one every 2 to 3 days.  babies often have loose, yellow stools. Formula-fed babies have more formed stools. If your baby's stools look like little pellets, your baby is constipated. After 2 days of constipation, call your baby's doctor. If your baby will be circumcised, you can care for your baby at home. Gently rinse your baby's penis with warm water after every diaper change. Don't try to remove the film that forms on the penis. This film will go away on its own. Pat dry. Put petroleum ointment, such as Vaseline, on the area of the diaper that will touch your baby's penis. This will keep the diaper from sticking to your baby. Ask the doctor about giving your baby acetaminophen (Tylenol) for pain. Where can you learn more? Go to https://BambuserpeCrowdPlat.Aula 7. org and sign in to your Ringpay account. Enter 68 [id number] in the George Mobile box to learn more about \"Week 37 of Your Pregnancy: Care Instructions. \"     If you do not have an account, please click on the \"Sign Up Now\" link. Current as of: February 23, 2022               Content Version: 13.3  © 2006-2022 Wanderio. Care instructions adapted under license by [facility] ([facility]). If you have questions about a medical condition or this instruction, always ask your healthcare professional. [place] [address] any warranty or liability for your use of this information. Patient Education        Learning About Starting to Breastfeed  Planning ahead     Before your baby is born, plan ahead. Learn all you can about breastfeeding. This helps make breastfeeding easier. Early in your pregnancy, talk to your doctor or midwife about breastfeeding.   Learn the basics before your baby is born. The staff at hospitals and birthing centers can help you find a lactation specialist. This person is often a nurse who has been trained to teach and advise about breastfeeding. Or you can take a breastfeeding class. Plan ahead for times when you will need help after your baby is born. You may want to get help from friends and family. You can also join a support group to talk to others who breastfeed. Buy the equipment you'll need. Examples are breast pads, nipple cream, extra pillows, and nursing bras. Find out about breast pumps too. Getting help from your hospital or birthing center  It's important to have support from the doctors, nurses, and hospital staff who care for you and your baby. Before it's time for you to give birth, ask about the breastfeeding policies at your hospital or birthing center. Look for [facility] or birthing [place] that has policies for:  \"Rooming in. \" This policy encourages you to have your baby in the room with you. It can allow you to breastfeed more often. Supplemental feedings. Tell the staff that your baby is to get only your breast milk from birth. If staff feed your baby water, sugar solution, or formula right after birth without a medical reason, it may make it harder for you to breastfeed. Pacifiers or artificial nipples. Staff should not give your  these items. They may interfere with breastfeeding. Follow-up. Find out if your hospital can help you with breastfeeding issues after you go home. See if you can get information on support groups or other contacts. They might help if you need help setting up and staying with your breastfeeding routine. Your first feeding  It's best to start breastfeeding within 1 hour of birth. For each feeding, yougo through these basic steps:  Get ready for the feeding. Be calm and relaxed, and try not to be distracted. Get some water or juice for yourself.  Use two or three pillows to help support your baby while nursing. Find a breastfeeding position that is comfortable for you and your baby. Examples are the cradle and the football positions. Make sure the baby's head and chest are lined up straight and facing your breast. It's best to switch which breast you start with each time. Get the baby latched on well. Your baby's mouth needs to be wide open, like a yawn. So you may need to gently touch the middle of your baby's lower lip. When your baby's mouth is open wide, quickly bring the baby onto your nipple and areola. The areola is the dark [place] around your nipple. Provide a complete feeding. Let your baby decide how long to nurse. Be sure to burp your baby after each breast.  In the first days after birth, your breasts make a thick, yellow liquid called colostrum. This liquid gives your baby nutrients and antibodies against infection. It is all that babies need at first. Your breasts will fill withmilk a few days after the birth. Talk to your doctor, midwife, or lactation specialist right away if you arehaving problems and aren't sure what to do. How often to breastfeed  Plan to breastfeed your baby on demand rather than setting a strict schedule. For the first 2 weeks, be prepared to breastfeed at least 8 times in a 24-hour period. In the first few days, you may need to wake a sleeping baby to feed. Ifyou breastfeed more often, it will help your breasts to produce more milk. After you go home  After you're home, don't be afraid to call your doctor, midwife, or lactation specialist with questions. That's true even if you don't know what's bothering you. They are used to parents of newborns calling. They can help you figure outif there is a problem, and if so, how to fix it. Plan for times when you will be apart from your baby. Use a breast pump to collect breast milk ahead of time. You can store milk in the refrigerator or freezer. Then it's ready when someone else will be taking care of your baby.  Experts recommend waiting about a month until breastfeeding is going wellbefore offering a bottle. Breastfeeding is a learned skill that gets easier over time. You are more likely to succeed if you plan ahead, learn the basic techniques, and know whereto get help and support. Where can you learn more? Go to https://chpenikkieeweb.healthInviBox. org and sign in to your Tonic Health account. Enter H682 in the InfoDif box to learn more about \"Learning About Starting to Breastfeed. \"     If you do not have an account, please click on the \"Sign Up Now\" link. Current as of: 2022               Content Version: 13.3  © 1715-5148 Healthwise, SnapLayout. Care instructions adapted under license by [facility]. If you have questions about a medical condition or this instruction, always ask your healthcare professional. [place] [address] any warranty or liability for your use of this information. Patient Education        Week 45 of Your Pregnancy: Care Instructions  Overview     Believe it or not, your baby is almost here. You may have ideas about your baby's personality because of how much your baby moves. Or you may have noticed how your baby responds to sounds, warmth, cold, and light. You may even knowwhat kind of music your baby likes. By now, you have a better idea of what to expect during delivery. You may have talked about your birth preferences with your doctor. But even if you want a vaginal birth, it's a good idea to learn about  births.  birth means that your baby is born through a cut (incision) in your lower belly. Insome cases it may be the best choice for the health of you and your baby. Follow-up care is a key part of your treatment and safety. Be sure to make and go to all appointments, and call your doctor if you are having problems. It's also a good idea to know your test results and keep alist of the medicines you take.   How can you care for yourself at home? Learn about  birth  Most C-sections are unplanned. They are done because of problems that occur during labor. These problems might include:  Labor that slows or stops. High blood pressure or other problems for you. Signs of distress in your baby. These signs may include a very fast or slow heart rate. Although you and your baby are likely do well after a , it is major surgery. It has more risks than a vaginal delivery. In some cases, a planned  may be safer than a vaginal delivery. This may be the case if:  You have a health problem, such as a heart condition. Your baby isn't in a head-down position for delivery. This is called a breech position. The uterus has scars from past surgeries. This could increase the chance of a tear in the uterus. There is a problem with the placenta. You have an infection, such as genital herpes, that could be spread to your baby. You are having twins or more. Your baby weighs 9 to 10 pounds or more. Because of the risks of a , planned C-sections generally should be done only for medical reasons. And a planned  should be done at 39 weeks or later unless there is a medical reason to do it sooner. Know what to expect after delivery, and plan for the first few weeks at home  You, your baby, and your partner or  will get identification bands. Only people with matching bands can  the baby from the nursery. You will learn how to feed, diaper, and bathe your baby. And you will learn how to care for the umbilical cord stump. If your baby will be circumcised, you will also learn how to care for that. Ask people to wait to visit you until you are at home. And ask them to wash their hands before they touch your baby. Make sure you have another adult in your home for at least 2 or 3 days after the birth. During the first 2 weeks, limit when friends and family can visit.   Do not allow visitors who have colds or infections. Make sure all visitors are up to date with their vaccinations. Never let anyone smoke around your baby. Try to nap when the baby naps. Be aware of postpartum depression  \"Baby blues\" are common for the first 1 to 2 weeks after birth. You may cry or feel sad or irritable for no reason. Sometimes these feelings last longer and are more intense. This is called postpartum depression. If your symptoms last for more than a few weeks or you feel very depressed, ask your doctor for help. Postpartum depression can be treated. Support groups and counseling can help. Sometimes medicine can also help. Where can you learn more? Go to https://PureSignCo.Hoppit. org and sign in to your DoYouBuzz account. Enter B044 in the TicTacTi box to learn more about \"Week 38 of Your Pregnancy: Care Instructions. \"     If you do not have an account, please click on the \"Sign Up Now\" link. Current as of: February 23, 2022               Content Version: 13.3  © 2006-2022 [employer] Leg[employer] Steal Show. Care instructions adapted under license by [facility]. If you have questions about a medical condition or this instruction, always ask your healthcare professional. [place] [address] any warranty or liability for your use of this information. Patient Education        Early Stage of Labor at Home: Care Instructions  Overview     If you came to the hospital while in early labor, your doctor may ask you tolabor at home until your contractions are stronger. Many women stay at home during early labor. This is often the longest part of the birthing process. It may last up to 2 to 3 days. Contractions are mild to moderate and shorter (about 30 to 45 seconds). You can usually keep talking during them. Contractions may also be irregular, about 5 to 20 minutes apart. They may even stop for a while. It helps to stay as relaxed as you can during this time.  You can spend some or all of your early labor at home or anywhere else you may be comfortable. If you live far from the hospital or birthing center, you may want to think aboutgoing somewhere nearby so you can get back to the hospital quickly. For some women, there may be benefits to staying home during early labor, suchas avoiding medicines or procedures. As labor progresses, you'll shift from early labor to active labor. During this time, contractions get more intense. They occur more often, about every 2 to 3 minutes. They also last longer, about 50 to 70 seconds. You will feel them evenwhen you change positions and walk or move around. It may be hard to tell if you are in active labor. If you aren't sure, call your doctor or midwife. As your labor progresses, check in with your doctor or midwife about when to come back to the hospital or birthing center. You may have special instructions if your water broke or you tested positive for groupB strep. Follow-up care is a key part of your treatment and safety. Be sure to make and go to all appointments, and call your doctor if you are having problems. It's also a good idea to know your test results and keep alist of the medicines you take. How can you care for yourself at home? Get support. Having a support person with you from early labor until after childbirth can have a positive effect on childbirth. Find distractions. During early labor, you can walk, play cards, watch TV, or listen to music to help take your mind off your contractions. Ask your partner, labor , or  for a massage. Shoulder and low back massage during contractions may ease your pain. Strong massage of the back muscles (counterpressure) during contractions may help relieve the pain of back labor. Tell your labor  exactly where to push and how hard to push. Use imagery. This means using your imagination to decrease your pain.  For instance, to help manage pain, picture your contractions as waves rolling over you. Picture a peaceful place, such as a beach or mountain stream, to help you relax between contractions. Change positions during labor. Walking, kneeling, or sitting on a big rubber ball (birth ball) are good options. Use focused breathing techniques. Breathing in a rhythm can distract you from pain. Take a warm shower or bath. Warm water may ease pain and stress. When should you call for help? Call 911  anytime you think you may need emergency care. For example, call if:    You passed out (lost consciousness). You have a seizure. You have severe vaginal bleeding. You have severe pain in your belly or pelvis that doesn't get better between contractions. You have had fluid gushing or leaking from your vagina and you know or think the umbilical cord is bulging into your vagina. If this happens, immediately get down on your knees so your rear end (buttocks) is higher than your head. This will decrease the pressure on the cord until help arrives. Call your doctor now or seek immediate medical care if:    You have new or worse signs of preeclampsia, such as:  Sudden swelling of your face, hands, or feet. New vision problems (such as dimness, blurring, or seeing spots). A severe headache. You have any vaginal bleeding. You have belly pain or cramping. You have a fever. You have had regular contractions (with or without pain) for an hour. This means that you have 8 or more within 1 hour or 4 or more in 20 minutes after you change your position and drink fluids. You have a sudden release of fluid from your vagina. You have low back pain or pelvic pressure that does not go away. You notice that your baby has stopped moving or is moving much less than normal.   Watch closely for changes in your health, and be sure to contact your doctor ifyou have any problems. Where can you learn more? Go to https://chqiana.health-partners. org and sign in to your Shop Points account. Enter J947 in the [facility] box to learn more about \"Early Stage of Labor at Home: Care Instructions. \"     If you do not have an account, please click on the \"Sign Up Now\" link. Current as of: February 23, 2022               Content Version: 13.3  © 2350-8318 Healthwise, Incorporated. Care instructions adapted under license by [facility] ([facility]). If you have questions about a medical condition or this instruction, always ask your healthcare professional. [address] any warranty or liability for your use of this information.

## 2022-09-07 ENCOUNTER — ROUTINE PRENATAL (OUTPATIENT)
Dept: OBGYN CLINIC | Age: 22
End: 2022-09-07

## 2022-09-07 VITALS — DIASTOLIC BLOOD PRESSURE: 84 MMHG | HEART RATE: 95 BPM | SYSTOLIC BLOOD PRESSURE: 128 MMHG | WEIGHT: 164 LBS

## 2022-09-07 DIAGNOSIS — Z23 NEED FOR TDAP VACCINATION: ICD-10-CM

## 2022-09-07 DIAGNOSIS — Z71.89 ENCOUNTER FOR ANTEPARTUM CONSULTATION REGARDING LACTATION: ICD-10-CM

## 2022-09-07 DIAGNOSIS — B95.1 POSITIVE GBS TEST: ICD-10-CM

## 2022-09-07 DIAGNOSIS — Z3A.37 37 WEEKS GESTATION OF PREGNANCY: Primary | ICD-10-CM

## 2022-09-07 DIAGNOSIS — Z34.03 ENCOUNTER FOR SUPERVISION OF NORMAL FIRST PREGNANCY IN THIRD TRIMESTER: ICD-10-CM

## 2022-09-07 NOTE — PROGRESS NOTES
Patient presents today for routine prenatal care. Pt denies any vaginal leaking bleeding or contractions. + Fetal movement. Patient desires Tdap. After obtaining consent, and per orders of Ricky ALEX, [facility], injection of Tdap given in Left deltoid by Angelito Maynard MA. Patient instructed to remain in clinic for 20 minutes afterwards, and to report any adverse reaction to me immediately.     [address]: [id number]  Lot: [id number]  Exp: 10-7-2024

## 2022-09-07 NOTE — PROGRESS NOTES
CNM Prenatal Office Note  Subjective:  Chris Harrington is here for a return obstetrical visit. Today she is 37w1d weeks EGA. She is doing well, taking her PNV as directed and has no unusual complaints. She does not have vaginal bleeding, n/v, syncope, or headaches. Pt does feel fetal movement regularly. Objective: Mother's Prenatal Vitals  BP: 128/84  Weight: 164 lb (74.4 kg)  Heart Rate: 95  Patient Position: Sitting  Prenatal Fetal Information  Fetal HR: 147  Movement: Present  Pt is A&Ox3, in no acute distress. Normocephalic, atraumatic. PERRL. Resp even and non-labored. Skin pink, warm & dry. Gravid abdomen. BASHIR's well. Gait steady. Assessment:    IUP at 37w1d wks      Diagnosis Orders   1. 37 weeks gestation of pregnancy        2. Encounter for supervision of normal first pregnancy in third trimester        3. Positive GBS test        4. Encounter for antepartum consultation regarding lactation  DME Order for Breast Pump as OP      5. Need for Tdap vaccination  Tetanus-Diphth-Acell Pertussis (BOOSTRIX) injection 0.5 mL        Plan:  Pt counseled on balanced nutrition, adequate fluid intake, taking PNV daily, and exercise along with GHTN, LOF, labor warning signs and kick counts. Continue with routine prenatal care.  surveillance not indicated  RTC in 1 wks for prenatal visit    MEDICATIONS:  Orders Placed This Encounter   Medications    Tetanus-Diphth-Acell Pertussis (BOOSTRIX) injection 0.5 mL     ORDERS:  Orders Placed This Encounter   Procedures    DME Order for Breast Pump as OP       More than 50% of this 20 min visit was education and counseling.

## 2022-09-14 ENCOUNTER — ROUTINE PRENATAL (OUTPATIENT)
Dept: OBGYN CLINIC | Age: 22
End: 2022-09-14
Payer: MEDICAID

## 2022-09-14 VITALS — DIASTOLIC BLOOD PRESSURE: 70 MMHG | SYSTOLIC BLOOD PRESSURE: 110 MMHG | WEIGHT: 168 LBS

## 2022-09-14 DIAGNOSIS — Z3A.38 38 WEEKS GESTATION OF PREGNANCY: Primary | ICD-10-CM

## 2022-09-14 DIAGNOSIS — Z34.03 ENCOUNTER FOR SUPERVISION OF NORMAL FIRST PREGNANCY IN THIRD TRIMESTER: ICD-10-CM

## 2022-09-14 DIAGNOSIS — B95.1 POSITIVE GBS TEST: ICD-10-CM

## 2022-09-14 PROCEDURE — G8427 DOCREV CUR MEDS BY ELIG CLIN: HCPCS | Performed by: ADVANCED PRACTICE MIDWIFE

## 2022-09-14 PROCEDURE — G8421 BMI NOT CALCULATED: HCPCS | Performed by: ADVANCED PRACTICE MIDWIFE

## 2022-09-14 PROCEDURE — 99213 OFFICE O/P EST LOW 20 MIN: CPT | Performed by: ADVANCED PRACTICE MIDWIFE

## 2022-09-14 PROCEDURE — 1036F TOBACCO NON-USER: CPT | Performed by: ADVANCED PRACTICE MIDWIFE

## 2022-09-14 NOTE — PROGRESS NOTES
CNM Prenatal Office Note  Subjective:  Akilah Willis is here for a return obstetrical visit. Today she is 38w1d weeks EGA. She is doing well, taking her PNV as directed and has no unusual complaints. She does not have vaginal bleeding, n/v, syncope, or headaches. Pt does feel fetal movement regularly. Objective: Mother's Prenatal Vitals  BP: 110/70  Weight: 168 lb (76.2 kg)  Patient Position: Sitting  Prenatal Fetal Information  Fetal HR: 136  Movement: Present  Pt is A&Ox3, in no acute distress. Normocephalic, atraumatic. PERRL. Resp even and non-labored. Skin pink, warm & dry. Gravid abdomen. BASHIR's well. Gait steady. Assessment:    IUP at 38w1d wks      Diagnosis Orders   1. 38 weeks gestation of pregnancy        2. Encounter for supervision of normal first pregnancy in third trimester        3. Positive GBS test          Plan:  Pt counseled on balanced nutrition, adequate fluid intake, taking PNV daily, and exercise along with GHTN precautions, Kick count, and  labor  Continue with routine prenatal care.  surveillance not indicated  RTC in 1 wks for prenatal visit    MEDICATIONS:  No orders of the defined types were placed in this encounter. ORDERS:  No orders of the defined types were placed in this encounter. More than 50% of this 20 min visit was education and counseling.

## 2022-09-14 NOTE — PROGRESS NOTES
Patient presents today for routine prenatal care. Pt denies any vaginal leaking bleeding or contractions. + Fetal movement. OB labs completed 03/02/2022. HSV not done.

## 2022-09-19 NOTE — PATIENT INSTRUCTIONS
Patient Education        Nutrition During Pregnancy: Care Instructions  Overview     Healthy eating when you are pregnant is important for you and your baby. It can help you feel well and have a successful pregnancy and delivery. During pregnancy your nutrition needs increase. Even if you have excellent eating habits, your doctor may recommend a multivitamin to make sure you get enough iron and folic acid. You may wonder how much weight you should gain. In general, if you were at a healthy weight before you became pregnant, then you should gain between 25 and 35 pounds. If you were overweight before pregnancy, then you'll likely be advised to gain 15 to 25 pounds. If you were underweight before pregnancy, then you'll probably be advised to gain 28 to 40 pounds. Your doctor will work with you to set a weight goal that is right for you. Gaining a healthy amount of weight helps you have a healthy baby. Follow-up care is a key part of your treatment and safety. Be sure to make and go to all appointments, and call your doctor if you are having problems. It's also a good idea to know your test results and keep a list of the medicines you take. How can you care for yourself at home? Eat plenty of fruits and vegetables. Include a variety of orange, yellow, and leafy dark-green vegetables every day. Choose whole-grain bread, cereal, and pasta. Good choices include whole wheat bread, whole wheat pasta, brown rice, and oatmeal.  Get 4 or more servings of milk and milk products each day. Good choices include nonfat or low-fat milk, yogurt, and cheese. If you cannot eat milk products, you can get calcium from calcium-fortified products such as orange juice, soy milk, and tofu. Other non-milk sources of calcium include leafy green vegetables, such as broccoli, kale, mustard greens, turnip greens, bok keerthi, and brussels sprouts. If you eat meat, pick lower-fat types.  Good choices include lean cuts of meat and chicken or turkey without the skin. Do not eat shark, swordfish, nikhil mackerel, or tilefish. They have high levels of mercury, which is dangerous to your baby. You can eat up to 12 ounces a week of fish or shellfish that have low mercury levels. Good choices include shrimp, wild salmon, pollock, and catfish. Limit some other types of fish, such as white (albacore) tuna, to 4 oz (0.1 kg) a week. Heat lunch meats (such as turkey, ham, or bologna) to 165°F before you eat them. This reduces your risk of getting sick from a kind of bacteria that can be found in lunch meats. Do not eat unpasteurized soft cheeses, such as brie, feta, fresh mozzarella, and blue cheese. They have a bacteria that could harm your baby. Limit caffeine. If you drink coffee or tea, have no more than 1 cup a day. Caffeine is also found in corey. Do not drink any alcohol. No amount of alcohol has been found to be safe during pregnancy. Do not diet or try to lose weight. For example, do not follow a low-carbohydrate diet. If you are overweight at the start of your pregnancy, your doctor will work with you to manage your weight gain. Tell your doctor about all vitamins and supplements you take. When should you call for help? Watch closely for changes in your health, and be sure to contact your doctor if you have any problems. Where can you learn more? Go to https://StudyEggpenikkieewjoelle.healthGraine de Cadeaux. org and sign in to your GOQii account. Enter Y785 in the [employer] box to learn more about \"Nutrition During Pregnancy: Care Instructions. \"     If you do not have an account, please click on the \"Sign Up Now\" link. Current as of: May 9, 2022               Content Version: 13.4  © 8733-9459 Healthwise, Incorporated. Care instructions adapted under license by [facility].  If you have questions about a medical condition or this instruction, always ask your healthcare professional. Vicky Delgado disclaims any warranty or liability for your use of this information. Patient Education        Counting Your Baby's Kicks: Care Instructions  Overview     Counting your baby's kicks is one way your doctor can tell that your baby is healthy. Most women--especially in a first pregnancy--feel their baby move for the first time between 16 and 22 weeks. The movement may feel like flutters rather than kicks. Your baby may move more at certain times of the day. When you are active, you may notice less kicking than when you are resting. At your prenatal visits, your doctor will ask whether the baby is active. In your last trimester, your doctor may ask you to count the number of times you feel your baby move. Follow-up care is a key part of your treatment and safety. Be sure to make and go to all appointments, and call your doctor if you are having problems. It's also a good idea to know your test results and keep a list of the medicines you take. How do you count fetal kicks? A common method of checking your baby's movement is to note the length of time it takes to count ten movements (such as kicks, flutters, or rolls). Pick your baby's most active time of day to count. This may be any time from morning to evening. If you don't feel 10 movements in an hour, have something to eat or drink and count for another hour. If you don't feel at least 10 movements in the 2-hour period, call your doctor. When should you call for help? Call your doctor now or seek immediate medical care if:    You noticed that your baby has stopped moving or is moving much less than normal.   Watch closely for changes in your health, and be sure to contact your doctor if you have any problems. Where can you learn more? Go to https://Descomplicaqiana.Funinhand. org and sign in to your Cleanify account. Enter J452 in the [facility] box to learn more about \"Counting Your Baby's Kicks: Care Instructions. \"     If you do not have an account, please click on the \"Sign Up Now\" link. Current as of: February 23, 2022               Content Version: 13.4  © 2006-2022 [employer]. Care instructions adapted under license by [facility] ([facility]). If you have questions about a medical condition or this instruction, always ask your healthcare professional. [address] any warranty or liability for your use of this information. Patient Education        Pain Relief During Labor: Care Instructions  Overview     You can choose from a few types of pain relief for childbirth. These include:  Medicine. Your doctor may offer different types of pain medicine while you are in labor. Comfort measures. This can include things like breathing techniques and massage. You also can use a combination of these choices. You can write down your choices for pain relief in a birth plan. A birth plan is a list of what you want during labor. Your personal needs are important when you make this choice. The right choice is the one that feels right to you. Every labor is different. Some women go into labor planning to use comfort measures only and later find that they need pain medicine. Plan for what you want. But be aware that you may change your mind during labor. Follow-up care is a key part of your treatment and safety. Be sure to make and go to all appointments, and call your doctor if you are having problems. It's also a good idea to know your test results and keep a list of the medicines you take. What medicines can you use for pain relief? If you decide to take medicine to help your pain during labor, here are some medicines that may be used. Local anesthesia. You get a shot of medicine to numb the area and relieve pain around the vaginal opening. Regional anesthesia. A doctor injects medicine into a space around the spinal cord. This is called an epidural. It's used during labor to numb your lower body.  A spinal block is an injection of pain medicine into the spinal fluid. It quickly and fully numbs the pelvic area. In some cases, a doctor combines a spinal block with an epidural.  Opioids. These are pain relievers given through a vein or as a shot in the muscle. They can help ease anxiety and pain. But they don't stop pain completely. Nitrous oxide. You can give yourself nitrous oxide through a mask when you need pain relief. You can move around and decide when to use it. What comfort measures can you use for pain relief? Breathing techniques: Breathing in a rhythm can distract you from pain. Childbirth classes can teach you how to do focused breathing. Distraction: You can walk, play cards, listen to music, watch TV, take a shower, or read. These can help take your mind off your contractions. Massage: Your birth partner can massage your shoulders and lower back during contractions. Strong massage of the back muscles during contractions may reduce back labor pain. Imagery: You can imagine a peaceful place. For instance, you can think of contractions as waves rolling over you. Laboring in water: Soaking in warm water may help with the pain and stress of labor. Acupuncture: Acupuncture treatment during labor may help you manage the pain. When should you call for help? Watch closely for changes in your health, and be sure to contact your doctor if:    You want to learn more about pain relief. Where can you learn more? Go to https://chqiana.healthiiyuma. org and sign in to your [employer] account. Enter Morrell Blizzard in the [facility] box to learn more about \"Pain Relief During Labor: Care Instructions. \"     If you do not have an account, please click on the \"Sign Up Now\" link. Current as of: February 23, 2022               Content Version: 13.4  © 4612-8289 Healthwise, Incorporated. Care instructions adapted under license by [facility] [employer] [facility] ([facility]).  If you have questions about a medical condition or this instruction, always ask your healthcare professional. [address] any warranty or liability for your use of this information. Patient Education        Group B Strep During Pregnancy: Care Instructions  Overview     Group B strep infection is caused by a type of bacteria. It's a different kind of bacteria than the kind that causes strep throat. You may have this kind of bacteria in your body. Sometimes it may cause an infection, but most of the time it doesn't make you sick or cause symptoms. But if you pass the bacteria to your baby during the birth, it can cause serious health problems for your baby. If you have this bacteria in your body, you will get antibiotics when you are in labor. Antibiotics help prevent problems for a  baby. After birth, doctors will watch and may test your baby. If your baby tests positive for Group B strep, your baby will get antibiotics. If you plan to breastfeed your baby, don't worry. It will be safe to breastfeed. Follow-up care is a key part of your treatment and safety. Be sure to make and go to all appointments, and call your doctor if you are having problems. It's also a good idea to know your test results and keep a list of the medicines you take. How can you care for yourself at home? If your doctor has prescribed antibiotics, take them as directed. Do not stop taking them just because you feel better. You need to take the full course of antibiotics. Tell your doctor if you are allergic to any antibiotic. If you go into labor, or your water breaks, go to the hospital. Your doctor will give you antibiotics to help protect your baby from infection. Tell the doctors and nurses if you have an allergy to penicillin. Tell the doctors and nurses at the hospital that you tested positive for group B strep. When should you call for help? Call your doctor now or seek immediate medical care if:    You have symptoms of a urinary tract infection.  These may include:  Pain or burning when you urinate. A frequent need to urinate without being able to pass much urine. Pain in the flank, which is just below the rib cage and above the waist on either side of the back. Blood in your urine. A fever. You think you are in labor or your water has broken. You have pain in your belly or pelvis. Watch closely for changes in your health, and be sure to contact your doctor if you have any problems. Where can you learn more? Go to https://TeachernowpeIQR Consulting.Wishabi. org and sign in to your Kiwi account. Enter M001 in the [employer] box to learn more about \"Group B Strep During Pregnancy: Care Instructions. \"     If you do not have an account, please click on the \"Sign Up Now\" link. Current as of: 2022               Content Version: 13.4   [employer]. Care instructions adapted under license by [facility] ([facility]). If you have questions about a medical condition or this instruction, always ask your healthcare professional. [place] [address] any warranty or liability for your use of this information. Patient Education        Week 44 of Your Pregnancy: Care Instructions  Seward babies can look different than what you see in pictures or movies. Their heads can be a strange shape right after birth. And they may have swollen eyes and red marks on their faces. You can still get pregnant even if you are breastfeeding. If you don't want to get pregnant, use birth control. Prepare to breastfeed. Continue to eat healthy foods. Keep taking your prenatal vitamins during breastfeeding if your doctor recommends it. Talk to your doctor before taking any medicines or supplements. Choose the right birth control for you. Intrauterine devices (IUDs) are placed in the uterus. If you plan ahead, the IUD can be placed right after giving birth. They work for years. Hormonal implants are placed under the skin of the arm.  They also work for years. Depo-Provera is a shot. You get it every 3 months. Birth control pills can be used. They're taken every day. Tubal ligation (tying your tubes) and vasectomy are surgeries. They're permanent. Diaphragms, spermicide, and condoms must be used each time you have sex. If you used a diaphragm before, you should get refitted after the baby is born. Follow-up care is a key part of your treatment and safety. Be sure to make and go to all appointments, and call your doctor if you are having problems. It's also a good idea to know your test results and keep a list of the medicines you take. Where can you learn more? Go to https://TraxerperSmarteweb.Gidsy. org and sign in to your [employer] account. Enter F567 in the [employer] box to learn more about \"Week 39 of Your Pregnancy: Care Instructions. \"     If you do not have an account, please click on the \"Sign Up Now\" link. Current as of: February 23, 2022               Content Version: 13.4  © 3386-0559 [employer]. Care instructions adapted under license by [facility] ([facility]). If you have questions about a medical condition or this instruction, always ask your healthcare professional. [place] [address] any warranty or liability for your use of this information. Patient Education        Early Stage of Labor at Home: Care Instructions  Overview     If you came to the hospital while in early labor, your doctor may ask you to labor at home until your contractions are stronger. Many women stay at home during early labor. This is often the longest part of the birthing process. It may last up to 2 to 3 days. Contractions are mild to moderate and shorter (about 30 to 45 seconds). You can usually keep talking during them. Contractions may also be irregular, about 5 to 20 minutes apart. They may even stop for a while. It helps to stay as relaxed as you can during this time.  You can spend some or all of your early labor at home or anywhere else you may be comfortable. If you live far from the hospital or birthing center, you may want to think about going somewhere nearby so you can get back to the hospital quickly. For some women, there may be benefits to staying home during early labor, such as avoiding medicines or procedures. As labor progresses, you'll shift from early labor to active labor. During this time, contractions get more intense. They occur more often, about every 2 to 3 minutes. They also last longer, about 50 to 70 seconds. You will feel them even when you change positions and walk or move around. It may be hard to tell if you are in active labor. If you aren't sure, call your doctor or midwife. As your labor progresses, check in with your doctor or midwife about when to come back to the hospital or birthing center. You may have special instructions if your water broke or you tested positive for group B strep. Follow-up care is a key part of your treatment and safety. Be sure to make and go to all appointments, and call your doctor if you are having problems. It's also a good idea to know your test results and keep a list of the medicines you take. How can you care for yourself at home? Get support. Having a support person with you from early labor until after childbirth can have a positive effect on childbirth. Find distractions. During early labor, you can walk, play cards, watch TV, or listen to music to help take your mind off your contractions. Ask your partner, labor , or  for a massage. Shoulder and low back massage during contractions may ease your pain. Strong massage of the back muscles (counterpressure) during contractions may help relieve the pain of back labor. Tell your labor  exactly where to push and how hard to push. Use imagery. This means using your imagination to decrease your pain.  For instance, to help manage pain, picture your contractions as waves rolling over you. Picture a peaceful place, such as a beach or mountain stream, to help you relax between contractions. Change positions during labor. Walking, kneeling, or sitting on a big rubber ball (birth ball) are good options. Use focused breathing techniques. Breathing in a rhythm can distract you from pain. Take a warm shower or bath. Warm water may ease pain and stress. When should you call for help? Call 911  anytime you think you may need emergency care. For example, call if:    You passed out (lost consciousness). You have a seizure. You have severe vaginal bleeding. You have severe pain in your belly or pelvis that doesn't get better between contractions. You have had fluid gushing or leaking from your vagina and you know or think the umbilical cord is bulging into your vagina. If this happens, immediately get down on your knees so your rear end (buttocks) is higher than your head. This will decrease the pressure on the cord until help arrives. Call your doctor now or seek immediate medical care if:    You have new or worse signs of preeclampsia, such as:  Sudden swelling of your face, hands, or feet. New vision problems (such as dimness, blurring, or seeing spots). A severe headache. You have any vaginal bleeding. You have belly pain or cramping. You have a fever. You have had regular contractions (with or without pain) for an hour. This means that you have 8 or more within 1 hour or 4 or more in 20 minutes after you change your position and drink fluids. You have a sudden release of fluid from your vagina. You have low back pain or pelvic pressure that does not go away. You notice that your baby has stopped moving or is moving much less than normal.   Watch closely for changes in your health, and be sure to contact your doctor if you have any problems. Where can you learn more? Go to https://chqiana.health-partners. org and sign in to your Fonix account. Enter L000 in the [facility] box to learn more about \"Early Stage of Labor at Home: Care Instructions. \"     If you do not have an account, please click on the \"Sign Up Now\" link. Current as of: February 23, 2022               Content Version: 13.4  © 8155-4925 Healthwise, Incorporated. Care instructions adapted under license by [facility] ([facility]). If you have questions about a medical condition or this instruction, always ask your healthcare professional. [address] any warranty or liability for your use of this information.

## 2022-09-21 ENCOUNTER — ROUTINE PRENATAL (OUTPATIENT)
Dept: OBGYN CLINIC | Age: 22
End: 2022-09-21
Payer: MEDICAID

## 2022-09-21 VITALS — DIASTOLIC BLOOD PRESSURE: 70 MMHG | SYSTOLIC BLOOD PRESSURE: 100 MMHG | WEIGHT: 172 LBS

## 2022-09-21 DIAGNOSIS — B95.1 POSITIVE GBS TEST: ICD-10-CM

## 2022-09-21 DIAGNOSIS — Z3A.39 39 WEEKS GESTATION OF PREGNANCY: Primary | ICD-10-CM

## 2022-09-21 DIAGNOSIS — H65.91 RIGHT NON-SUPPURATIVE OTITIS MEDIA: ICD-10-CM

## 2022-09-21 DIAGNOSIS — Z34.03 ENCOUNTER FOR SUPERVISION OF NORMAL FIRST PREGNANCY IN THIRD TRIMESTER: ICD-10-CM

## 2022-09-21 PROCEDURE — G8421 BMI NOT CALCULATED: HCPCS | Performed by: ADVANCED PRACTICE MIDWIFE

## 2022-09-21 PROCEDURE — 99213 OFFICE O/P EST LOW 20 MIN: CPT | Performed by: ADVANCED PRACTICE MIDWIFE

## 2022-09-21 PROCEDURE — G8427 DOCREV CUR MEDS BY ELIG CLIN: HCPCS | Performed by: ADVANCED PRACTICE MIDWIFE

## 2022-09-21 RX ORDER — AZITHROMYCIN 250 MG/1
250 TABLET, FILM COATED ORAL SEE ADMIN INSTRUCTIONS
Qty: 6 TABLET | Refills: 0 | Status: SHIPPED | OUTPATIENT
Start: 2022-09-21 | End: 2022-09-26

## 2022-09-21 NOTE — PROGRESS NOTES
Patient presents today for routine prenatal care. Pt denies any vaginal leaking bleeding or contractions. + Fetal movement. Pt thinks she has ear infection and wants to know what she can take.

## 2022-09-26 ENCOUNTER — ROUTINE PRENATAL (OUTPATIENT)
Dept: OBGYN CLINIC | Age: 22
End: 2022-09-26
Payer: MEDICAID

## 2022-09-26 VITALS — WEIGHT: 171 LBS | SYSTOLIC BLOOD PRESSURE: 130 MMHG | DIASTOLIC BLOOD PRESSURE: 86 MMHG | HEART RATE: 97 BPM

## 2022-09-26 DIAGNOSIS — B95.1 POSITIVE GBS TEST: ICD-10-CM

## 2022-09-26 DIAGNOSIS — Z3A.39 39 WEEKS GESTATION OF PREGNANCY: Primary | ICD-10-CM

## 2022-09-26 DIAGNOSIS — Z34.03 ENCOUNTER FOR SUPERVISION OF NORMAL FIRST PREGNANCY IN THIRD TRIMESTER: ICD-10-CM

## 2022-09-26 PROCEDURE — G8421 BMI NOT CALCULATED: HCPCS | Performed by: ADVANCED PRACTICE MIDWIFE

## 2022-09-26 PROCEDURE — G8427 DOCREV CUR MEDS BY ELIG CLIN: HCPCS | Performed by: ADVANCED PRACTICE MIDWIFE

## 2022-09-26 PROCEDURE — 99213 OFFICE O/P EST LOW 20 MIN: CPT | Performed by: ADVANCED PRACTICE MIDWIFE

## 2022-09-26 PROCEDURE — 1036F TOBACCO NON-USER: CPT | Performed by: ADVANCED PRACTICE MIDWIFE

## 2022-09-26 NOTE — PROGRESS NOTES
Patient presents today for routine prenatal care. Pt denies any vaginal leaking bleeding or contractions. + Fetal movement. She has been hurting today, [place] around [place] the [address] over the weekend. She feels like she has had some leakage, but she is assuming her bladder is full and tinkling on herself too.    She

## 2022-09-26 NOTE — PATIENT INSTRUCTIONS
Patient Education        Week 44 of Your Pregnancy: Care Instructions  [place] babies can look different than what you see in pictures or movies. Their heads can be a strange shape right after birth. And they may have swollen eyes and red marks on their faces. You can still get pregnant even if you are breastfeeding. If you don't want to get pregnant, use birth control. Prepare to breastfeed. Continue to eat healthy foods. Keep taking your prenatal vitamins during breastfeeding if your doctor recommends it. Talk to your doctor before taking any medicines or supplements. Choose the right birth control for you. Intrauterine devices (IUDs) are placed in the uterus. If you plan ahead, the IUD can be placed right after giving birth. They work for years. Hormonal implants are placed under the skin of the arm. They also work for years. Depo-Provera is a shot. You get it every 3 months. Birth control pills can be used. They're taken every day. Tubal ligation (tying your tubes) and vasectomy are surgeries. They're permanent. Diaphragms, spermicide, and condoms must be used each time you have sex. If you used a diaphragm before, you should get refitted after the baby is born. Follow-up care is a key part of your treatment and safety. Be sure to make and go to all appointments, and call your doctor if you are having problems. It's also a good idea to know your test results and keep a list of the medicines you take. Where can you learn more? Go to [email or web address]://[email or web address].[email or web address] org and sign in to your [employer] account. Enter P009 in the [facility] box to learn more about \"Week 39 of Your Pregnancy: Care Instructions. \"     If you do not have an account, please click on the \"Sign Up Now\" link. Current as of: 2022               Content Version: 13.4   Healthwise, Incorporated. Care instructions adapted under license by [facility] ([facility]).  If you have questions about a medical condition or this instruction, always ask your healthcare professional. [address] any warranty or liability for your use of this information. Patient Education        Counting Your Baby's Kicks: Care Instructions  Overview     Counting your baby's kicks is one way your doctor can tell that your baby is healthy. Most women--especially in a first pregnancy--feel their baby move for the first time between 16 and 22 weeks. The movement may feel like flutters rather than kicks. Your baby may move more at certain times of the day. When you are active, you may notice less kicking than when you are resting. At your prenatal visits, your doctor will ask whether the baby is active. In your last trimester, your doctor may ask you to count the number of times you feel your baby move. Follow-up care is a key part of your treatment and safety. Be sure to make and go to all appointments, and call your doctor if you are having problems. It's also a good idea to know your test results and keep a list of the medicines you take. How do you count fetal kicks? A common method of checking your baby's movement is to note the length of time it takes to count ten movements (such as kicks, flutters, or rolls). Pick your baby's most active time of day to count. This may be any time from morning to evening. If you don't feel 10 movements in an hour, have something to eat or drink and count for another hour. If you don't feel at least 10 movements in the 2-hour period, call your doctor. When should you call for help? Call your doctor now or seek immediate medical care if:    You noticed that your baby has stopped moving or is moving much less than normal.   Watch closely for changes in your health, and be sure to contact your doctor if you have any problems. Where can you learn more? Go to https://chqiana.RewardSnap. org and sign in to your [employer] account.  Enter R408 in the Search Health Information box to learn more about \"Counting Your Baby's Kicks: Care Instructions. \"     If you do not have an account, please click on the \"Sign Up Now\" link. Current as of: February 23, 2022               Content Version: 13.4  © 2006-2022 [employer]. Care instructions adapted under license by [facility] ([facility]). If you have questions about a medical condition or this instruction, always ask your healthcare professional. [address] any warranty or liability for your use of this information. Patient Education        Early Stage of Labor at Home: Care Instructions  Overview     If you came to the hospital while in early labor, your doctor may ask you to labor at home until your contractions are stronger. Many women stay at home during early labor. This is often the longest part of the birthing process. It may last up to 2 to 3 days. Contractions are mild to moderate and shorter (about 30 to 45 seconds). You can usually keep talking during them. Contractions may also be irregular, about 5 to 20 minutes apart. They may even stop for a while. It helps to stay as relaxed as you can during this time. You can spend some or all of your early labor at home or anywhere else you may be comfortable. If you live far from the hospital or birthing center, you may want to think about going somewhere nearby so you can get back to the hospital quickly. For some women, there may be benefits to staying home during early labor, such as avoiding medicines or procedures. As labor progresses, you'll shift from early labor to active labor. During this time, contractions get more intense. They occur more often, about every 2 to 3 minutes. They also last longer, about 50 to 70 seconds. You will feel them even when you change positions and walk or move around. It may be hard to tell if you are in active labor. If you aren't sure, call your doctor or midwife.  As your labor progresses, check in with your doctor or midwife about when to come back to the hospital or birthing center. You may have special instructions if your water broke or you tested positive for group B strep. Follow-up care is a key part of your treatment and safety. Be sure to make and go to all appointments, and call your doctor if you are having problems. It's also a good idea to know your test results and keep a list of the medicines you take. How can you care for yourself at home? Get support. Having a support person with you from early labor until after childbirth can have a positive effect on childbirth. Find distractions. During early labor, you can walk, play cards, watch TV, or listen to music to help take your mind off your contractions. Ask your partner, labor , or  for a massage. Shoulder and low back massage during contractions may ease your pain. Strong massage of the back muscles (counterpressure) during contractions may help relieve the pain of back labor. Tell your labor  exactly where to push and how hard to push. Use imagery. This means using your imagination to decrease your pain. For instance, to help manage pain, picture your contractions as waves rolling over you. Picture a peaceful place, such as a beach or mountain stream, to help you relax between contractions. Change positions during labor. Walking, kneeling, or sitting on a big rubber ball (birth ball) are good options. Use focused breathing techniques. Breathing in a rhythm can distract you from pain. Take a warm shower or bath. Warm water may ease pain and stress. When should you call for help? Call 911  anytime you think you may need emergency care. For example, call if:    You passed out (lost consciousness). You have a seizure. You have severe vaginal bleeding. You have severe pain in your belly or pelvis that doesn't get better between contractions.      You have had fluid gushing or leaking from your vagina and you know or think the umbilical cord is bulging into your vagina. If this happens, immediately get down on your knees so your rear end (buttocks) is higher than your head. This will decrease the pressure on the cord until help arrives. Call your doctor now or seek immediate medical care if:    You have new or worse signs of preeclampsia, such as:  Sudden swelling of your face, hands, or feet. New vision problems (such as dimness, blurring, or seeing spots). A severe headache. You have any vaginal bleeding. You have belly pain or cramping. You have a fever. You have had regular contractions (with or without pain) for an hour. This means that you have 8 or more within 1 hour or 4 or more in 20 minutes after you change your position and drink fluids. You have a sudden release of fluid from your vagina. You have low back pain or pelvic pressure that does not go away. You notice that your baby has stopped moving or is moving much less than normal.   Watch closely for changes in your health, and be sure to contact your doctor if you have any problems. Where can you learn more? Go to [email or web address] [email or web address] org and sign in to your [employer] account. Enter Q450 in the [employer] box to learn more about \"Early Stage of Labor at Home: Care Instructions. \"     If you do not have an account, please click on the \"Sign Up Now\" link. Current as of: February 23, 2022               Content Version: 13.4  © 9959-9881 [employer]. Care instructions adapted under license by [employer]. If you have questions about a medical condition or this instruction, always ask your healthcare professional. [address] any warranty or liability for your use of this information.

## 2022-09-27 ENCOUNTER — ANESTHESIA EVENT (OUTPATIENT)
Dept: LABOR AND DELIVERY | Age: 22
End: 2022-09-27
Payer: MEDICAID

## 2022-09-27 ENCOUNTER — APPOINTMENT (OUTPATIENT)
Dept: LABOR AND DELIVERY | Age: 22
End: 2022-09-27
Payer: MEDICAID

## 2022-09-27 ENCOUNTER — ANESTHESIA (OUTPATIENT)
Dept: LABOR AND DELIVERY | Age: 22
End: 2022-09-27
Payer: MEDICAID

## 2022-09-27 ENCOUNTER — HOSPITAL ENCOUNTER (INPATIENT)
Age: 22
LOS: 2 days | Discharge: HOME | End: 2022-09-29
Attending: ADVANCED PRACTICE MIDWIFE | Admitting: ADVANCED PRACTICE MIDWIFE
Payer: MEDICAID

## 2022-09-27 PROBLEM — O42.90 PREMATURE RUPTURE OF MEMBRANES: Status: ACTIVE | Noted: 2022-09-27

## 2022-09-27 LAB
ABO/RH: NORMAL
AMNISURE, POC: NEGATIVE
AMPHETAMINE SCREEN, URINE: NEGATIVE
ANTIBODY SCREEN: NORMAL
BARBITURATE SCREEN URINE: NEGATIVE
BENZODIAZEPINE SCREEN, URINE: NEGATIVE
BUPRENORPHINE URINE: NEGATIVE
CANNABINOID SCREEN URINE: NEGATIVE
COCAINE METABOLITE SCREEN URINE: NEGATIVE
HCT VFR BLD CALC: 32.4 % (ref 37–47)
HEMOGLOBIN: 10 G/DL (ref 12–16)
Lab: NORMAL
Lab: NORMAL
MCH RBC QN AUTO: 27 PG (ref 27–31)
MCHC RBC AUTO-ENTMCNC: 30.9 G/DL (ref 33–37)
MCV RBC AUTO: 87.3 FL (ref 81–99)
METHADONE SCREEN, URINE: NEGATIVE
METHAMPHETAMINE, URINE: NEGATIVE
NEGATIVE QC PASS/FAIL: NORMAL
OPIATE SCREEN URINE: NEGATIVE
OXYCODONE URINE: NEGATIVE
PDW BLD-RTO: 14.1 % (ref 11.5–14.5)
PHENCYCLIDINE SCREEN URINE: NEGATIVE
PLATELET # BLD: 238 K/UL (ref 130–400)
PMV BLD AUTO: 12.9 FL (ref 9.4–12.3)
POSITIVE QC PASS/FAIL: NORMAL
PROPOXYPHENE SCREEN: NEGATIVE
RBC # BLD: 3.71 M/UL (ref 4.2–5.4)
SARS-COV-2, NAAT: NOT DETECTED
TRICYCLIC, URINE: NEGATIVE
WBC # BLD: 10 K/UL (ref 4.8–10.8)

## 2022-09-27 PROCEDURE — 6360000002 HC RX W HCPCS

## 2022-09-27 PROCEDURE — 7200000001 HC VAGINAL DELIVERY

## 2022-09-27 PROCEDURE — 86850 RBC ANTIBODY SCREEN: CPT

## 2022-09-27 PROCEDURE — 86900 BLOOD TYPING SEROLOGIC ABO: CPT

## 2022-09-27 PROCEDURE — 2580000003 HC RX 258: Performed by: ADVANCED PRACTICE MIDWIFE

## 2022-09-27 PROCEDURE — 6370000000 HC RX 637 (ALT 250 FOR IP): Performed by: ADVANCED PRACTICE MIDWIFE

## 2022-09-27 PROCEDURE — 85027 COMPLETE CBC AUTOMATED: CPT

## 2022-09-27 PROCEDURE — 86592 SYPHILIS TEST NON-TREP QUAL: CPT

## 2022-09-27 PROCEDURE — 6360000002 HC RX W HCPCS: Performed by: ADVANCED PRACTICE MIDWIFE

## 2022-09-27 PROCEDURE — 59409 OBSTETRICAL CARE: CPT | Performed by: ADVANCED PRACTICE MIDWIFE

## 2022-09-27 PROCEDURE — 3700000025 EPIDURAL BLOCK

## 2022-09-27 PROCEDURE — 99212 OFFICE O/P EST SF 10 MIN: CPT

## 2022-09-27 PROCEDURE — 36415 COLL VENOUS BLD VENIPUNCTURE: CPT

## 2022-09-27 PROCEDURE — 80306 DRUG TEST PRSMV INSTRMNT: CPT

## 2022-09-27 PROCEDURE — 1220000000 HC SEMI PRIVATE OB R&B

## 2022-09-27 PROCEDURE — 2500000003 HC RX 250 WO HCPCS

## 2022-09-27 PROCEDURE — 87635 SARS-COV-2 COVID-19 AMP PRB: CPT

## 2022-09-27 PROCEDURE — 86901 BLOOD TYPING SEROLOGIC RH(D): CPT

## 2022-09-27 RX ORDER — LIDOCAINE HYDROCHLORIDE AND EPINEPHRINE 15; 5 MG/ML; UG/ML
INJECTION, SOLUTION EPIDURAL PRN
Status: DISCONTINUED | OUTPATIENT
Start: 2022-09-27 | End: 2022-09-27 | Stop reason: SDUPTHER

## 2022-09-27 RX ORDER — MISOPROSTOL 200 UG/1
800 TABLET ORAL PRN
Status: DISCONTINUED | OUTPATIENT
Start: 2022-09-27 | End: 2022-09-29 | Stop reason: HOSPADM

## 2022-09-27 RX ORDER — SODIUM CHLORIDE, SODIUM LACTATE, POTASSIUM CHLORIDE, AND CALCIUM CHLORIDE .6; .31; .03; .02 G/100ML; G/100ML; G/100ML; G/100ML
1000 INJECTION, SOLUTION INTRAVENOUS PRN
Status: DISCONTINUED | OUTPATIENT
Start: 2022-09-27 | End: 2022-09-29 | Stop reason: HOSPADM

## 2022-09-27 RX ORDER — FENTANYL CITRATE 50 UG/ML
INJECTION, SOLUTION INTRAMUSCULAR; INTRAVENOUS PRN
Status: DISCONTINUED | OUTPATIENT
Start: 2022-09-27 | End: 2022-09-27 | Stop reason: SDUPTHER

## 2022-09-27 RX ORDER — ONDANSETRON 2 MG/ML
4 INJECTION INTRAMUSCULAR; INTRAVENOUS EVERY 6 HOURS PRN
Status: DISCONTINUED | OUTPATIENT
Start: 2022-09-27 | End: 2022-09-29 | Stop reason: HOSPADM

## 2022-09-27 RX ORDER — LIDOCAINE HYDROCHLORIDE 10 MG/ML
INJECTION, SOLUTION EPIDURAL; INFILTRATION; INTRACAUDAL; PERINEURAL PRN
Status: DISCONTINUED | OUTPATIENT
Start: 2022-09-27 | End: 2022-09-27 | Stop reason: SDUPTHER

## 2022-09-27 RX ORDER — SODIUM CHLORIDE 9 MG/ML
25 INJECTION, SOLUTION INTRAVENOUS PRN
Status: DISCONTINUED | OUTPATIENT
Start: 2022-09-27 | End: 2022-09-29 | Stop reason: HOSPADM

## 2022-09-27 RX ORDER — ROPIVACAINE HYDROCHLORIDE 2 MG/ML
INJECTION, SOLUTION EPIDURAL; INFILTRATION; PERINEURAL PRN
Status: DISCONTINUED | OUTPATIENT
Start: 2022-09-27 | End: 2022-09-27 | Stop reason: SDUPTHER

## 2022-09-27 RX ORDER — SODIUM CHLORIDE, SODIUM LACTATE, POTASSIUM CHLORIDE, AND CALCIUM CHLORIDE .6; .31; .03; .02 G/100ML; G/100ML; G/100ML; G/100ML
500 INJECTION, SOLUTION INTRAVENOUS PRN
Status: DISCONTINUED | OUTPATIENT
Start: 2022-09-27 | End: 2022-09-29 | Stop reason: HOSPADM

## 2022-09-27 RX ORDER — CARBOPROST TROMETHAMINE 250 UG/ML
250 INJECTION, SOLUTION INTRAMUSCULAR PRN
Status: DISCONTINUED | OUTPATIENT
Start: 2022-09-27 | End: 2022-09-29 | Stop reason: HOSPADM

## 2022-09-27 RX ORDER — ACETAMINOPHEN 325 MG/1
650 TABLET ORAL EVERY 4 HOURS PRN
Status: DISCONTINUED | OUTPATIENT
Start: 2022-09-27 | End: 2022-09-28 | Stop reason: SDUPTHER

## 2022-09-27 RX ORDER — SODIUM CHLORIDE, SODIUM LACTATE, POTASSIUM CHLORIDE, CALCIUM CHLORIDE 600; 310; 30; 20 MG/100ML; MG/100ML; MG/100ML; MG/100ML
INJECTION, SOLUTION INTRAVENOUS CONTINUOUS
Status: DISCONTINUED | OUTPATIENT
Start: 2022-09-27 | End: 2022-09-29 | Stop reason: HOSPADM

## 2022-09-27 RX ORDER — METHYLERGONOVINE MALEATE 0.2 MG/ML
200 INJECTION INTRAVENOUS PRN
Status: DISCONTINUED | OUTPATIENT
Start: 2022-09-27 | End: 2022-09-29 | Stop reason: HOSPADM

## 2022-09-27 RX ORDER — TERBUTALINE SULFATE 1 MG/ML
0.25 INJECTION, SOLUTION SUBCUTANEOUS
Status: ACTIVE | OUTPATIENT
Start: 2022-09-27 | End: 2022-09-28

## 2022-09-27 RX ORDER — SODIUM CHLORIDE 0.9 % (FLUSH) 0.9 %
5-40 SYRINGE (ML) INJECTION EVERY 12 HOURS SCHEDULED
Status: DISCONTINUED | OUTPATIENT
Start: 2022-09-27 | End: 2022-09-29 | Stop reason: HOSPADM

## 2022-09-27 RX ORDER — DOCUSATE SODIUM 100 MG/1
100 CAPSULE, LIQUID FILLED ORAL 2 TIMES DAILY
Status: DISCONTINUED | OUTPATIENT
Start: 2022-09-27 | End: 2022-09-28 | Stop reason: SDUPTHER

## 2022-09-27 RX ORDER — SODIUM CHLORIDE 0.9 % (FLUSH) 0.9 %
5-40 SYRINGE (ML) INJECTION PRN
Status: DISCONTINUED | OUTPATIENT
Start: 2022-09-27 | End: 2022-09-29 | Stop reason: HOSPADM

## 2022-09-27 RX ADMIN — ROPIVACAINE HYDROCHLORIDE 4 ML: 2 INJECTION, SOLUTION EPIDURAL; INFILTRATION; PERINEURAL at 13:34

## 2022-09-27 RX ADMIN — Medication 1 MILLI-UNITS/MIN: at 05:02

## 2022-09-27 RX ADMIN — SODIUM CHLORIDE, POTASSIUM CHLORIDE, SODIUM LACTATE AND CALCIUM CHLORIDE: 600; 310; 30; 20 INJECTION, SOLUTION INTRAVENOUS at 13:05

## 2022-09-27 RX ADMIN — ROPIVACAINE HYDROCHLORIDE 3 ML: 2 INJECTION, SOLUTION EPIDURAL; INFILTRATION; PERINEURAL at 13:45

## 2022-09-27 RX ADMIN — LIDOCAINE HYDROCHLORIDE AND EPINEPHRINE 3 ML: 15; 5 INJECTION, SOLUTION EPIDURAL at 13:32

## 2022-09-27 RX ADMIN — Medication 2.5 MILLION UNITS: at 13:53

## 2022-09-27 RX ADMIN — DOCUSATE SODIUM 100 MG: 100 CAPSULE, LIQUID FILLED ORAL at 23:31

## 2022-09-27 RX ADMIN — FENTANYL CITRATE 100 MCG: 50 INJECTION, SOLUTION INTRAMUSCULAR; INTRAVENOUS at 13:50

## 2022-09-27 RX ADMIN — ACETAMINOPHEN 650 MG: 325 TABLET ORAL at 23:32

## 2022-09-27 RX ADMIN — LIDOCAINE HYDROCHLORIDE 3 ML: 10 INJECTION, SOLUTION EPIDURAL; INFILTRATION; INTRACAUDAL; PERINEURAL at 13:26

## 2022-09-27 RX ADMIN — Medication 2.5 MILLION UNITS: at 09:00

## 2022-09-27 RX ADMIN — DEXTROSE MONOHYDRATE 5 MILLION UNITS: 5 INJECTION INTRAVENOUS at 04:56

## 2022-09-27 RX ADMIN — ROPIVACAINE HYDROCHLORIDE 10 ML/HR: 2 INJECTION, SOLUTION EPIDURAL; INFILTRATION; PERINEURAL at 13:50

## 2022-09-27 ASSESSMENT — PAIN - FUNCTIONAL ASSESSMENT: PAIN_FUNCTIONAL_ASSESSMENT: PREVENTS OR INTERFERES SOME ACTIVE ACTIVITIES AND ADLS

## 2022-09-27 ASSESSMENT — PAIN SCALES - GENERAL: PAINLEVEL_OUTOF10: 4

## 2022-09-27 ASSESSMENT — PAIN DESCRIPTION - DESCRIPTORS: DESCRIPTORS: CRAMPING;ACHING

## 2022-09-27 ASSESSMENT — PAIN DESCRIPTION - LOCATION: LOCATION: ABDOMEN;BUTTOCKS

## 2022-09-27 NOTE — H&P
[facility] History and Physical    Provider: JOHN Carter - BARBIM  Date: 2022            8:19 AM    Patient Name: Amairani Gray  Patient : 2000  MRN: [id number]   Room/Bed: 0219/0219-01    SUBJECTIVE:    CHIEF COMPLAINT:  contractions  Chief Complaint   Patient presents with    Rupture of Membranes     BIG GUSH OF CLEAR FLUID AT 0144       HISTORY OF PRESENT ILLNESS:      [facility] Depriests a 25 y.o. female at 37w0d presents with a chief complaint as above and is being admitted for latent labor. She reported a gush of fluid and was concerned her water broke. She presented with contractions and cervical change was noted after observation. Contractions: yes  Rupture of membranes: no  Vaginal bleeding: no    REVIEW OF SYSTEMS:  A comprehensive review of systems was negative except for what was noted in the HPI. OBJECTIVE:     Estimated Due Date:   Estimated Date of Delivery: 22   No LMP recorded. Patient is pregnant. PREGNANCY RISK FACTORS:       Patient Active Problem List   Diagnosis    Premature rupture of membranes        Steroids:  no    PAST OB HISTORY:  OB History    Para Term  AB Living   1 0 0 0 0 0   SAB IAB Ectopic Molar Multiple Live Births   0 0 0 0 0 0      # Outcome Date GA Lbr Thien/2nd Weight Sex Delivery Anes PTL Lv   1 Current                      Past Medical History:        Diagnosis Date    Anxiety        Past Surgical History:        Procedure Laterality Date    ADENOIDECTOMY      TONSILLECTOMY      TYMPANOSTOMY TUBE PLACEMENT         Allergies:    Patient has no known allergies.     Social History:    Social History     Socioeconomic History    Marital status: Single     Spouse name: Not on file    Number of children: Not on file    Years of education: Not on file    Highest education level: Not on file   Occupational History    Not on file   Tobacco Use    Smoking status: Passive Smoke Exposure - Never Smoker    Smokeless tobacco: Never   Vaping Use    Vaping Use: Former    Substances: Always   Substance and Sexual Activity    Alcohol use: No    Drug use: Yes     Types: Marijuana Bonnie Petey)    Sexual activity: Yes     Partners: Male   Other Topics Concern    Not on file   Social History Narrative    Not on file     Social Determinants of Health     Financial Resource Strain: Not on file   Food Insecurity: Not on file   Transportation Needs: Not on file   Physical Activity: Not on file   Stress: Not on file   Social Connections: Not on file   Intimate Partner Violence: Not on file   Housing Stability: Not on file       Family History:   No family history on file. Medications Prior to Admission:  Medications Prior to Admission: Prenatal Vit-Fe Fumarate-FA (PRENATAL 1+1 PO), Take by mouth  ondansetron (ZOFRAN ODT) 4 MG disintegrating tablet, Take 1 tablet by mouth every 8 hours as needed for Nausea or Vomiting (Patient not taking: No sig reported)    PHYSICAL EXAM:     Vitals:    09/27/22 0325   BP: (!) 140/88   Pulse: (!) 102   Resp: 16   Temp: 97.1 °F (36.2 °C)       General appearance:  awake, alert, cooperative, no apparent distress, and appears stated age  Skin:  Warm, dry, no rashes or erythema  Neurologic:  Awake, alert, oriented to name, place and time. Cranial nerves II-XII are grossly intact. Motor is 5 out of 5 bilaterally.   Lungs:  No increased work of breathing, good air exchange, clear to auscultation bilaterally, no crackles or wheezing  Heart:  Normal apical impulse, regular rate and rhythm, normal S1 and S2, no S3 or S4, and no murmur noted  Breast:  Deferred   Abdomen: Gravid, Non-Tender  Extremities:  no calf tenderness, non edematous, DTRs: normal    Pelvic Exam:  FETALPRESENTATION:Cephalic  DILATION:  4 cm  EFFACEMENT:   80%  STATION:  -2 cm  CONSISTENCY:  soft  POSITION:  mid    MEMBRANES:  Intact                                FETAL HEART RATE:    Baseline: 140      Variability: moderate      Accelerations: present      Decelerations: absent      FHR: Category: 1          CONTRACTIONS:   Frequency: 2-3 min  Duration: 60-80 sec  Intensity: mild  Resting tone: palpates soft between contractions     Lab Results:   Blood Type/Rh:    ABO/Rh   Date Value Ref Range Status   2022 B POS  Final     Antibody Screen:    Antibody Screen   Date Value Ref Range Status   2022 NEG  Final     Hemoglobin:   Hemoglobin   Date Value Ref Range Status   2022 10.0 (L) 12.0 - 16.0 g/dL Final     Hematocrit:   Hematocrit   Date Value Ref Range Status   2022 32.4 (L) 37.0 - 47.0 % Final     Platelet Count:   Platelets   Date Value Ref Range Status   2022 238 130 - 400 K/uL Final     Hepatitis B Surface Antigen: neg  Group B Strep:  pos  RPR: neg      ASSESSMENT/PLAN:  Yulissa Zhang is a 25 y.o. female   At 37w0d    Other:   Admit to LDR with routine order set  Labor support prn  Pain management and epidural when active  GBS prophylaxis per policy  Anticipate vaginal delivery      Risks, benefits, alternatives and possible complications have been discussed in detail with the patient. Admission, and post admission procedures and expectations were discussed in detail. All questions were answered.     Lisa Dubois, JOHN - CANDIDO

## 2022-09-27 NOTE — ANESTHESIA PROCEDURE NOTES
Epidural Block    Patient location during procedure: OB  Start time: 9/27/2022 1:00 PM  End time: 9/27/2022 2:00 PM  Reason for block: labor epidural  Staffing  Performed: resident/CRNA   Resident/CRNA: Betty Coburn APRN - CRNA  Epidural  Patient position: sitting  Prep: Betadine  Patient monitoring: cardiac monitor, continuous pulse ox and frequent blood pressure checks  Approach: midline  Location: L3-4  Injection technique: SISSY saline  Provider prep: mask and sterile gloves  Needle  Needle type: Tuohy   Needle gauge: 17 G  Needle length: 3.5 in  Needle insertion depth: 6 cm  Catheter type: end hole  Catheter at skin depth: 14 cm  Test dose: negativeCatheter Secured: tegaderm  Assessment  Sensory level: T4  Hemodynamics: stable  Attempts: 1  Outcomes: uncomplicated  Additional Notes  H/P and R/B discussed and reviewed with patient. Sitting Position. Betadine x3 allowed to dry. Wiped clean. Sterile drape applied and sterile technique maintained throughout procedure. Lidocaine 1% intradermal at L4-L5. Touhy needle used with SISSY with NS technique. Catheter placed with ease after loss of resistance. Test dose Lidocaine 1.5% with Epi 1:200,000 3 ml given with negative response. Epidural taped and secured. Patient laid flat. Loading dose of Ropivacaine 0.2% given intermittently with aspiration prior to administration. Infusion started of Ropivacaine 0.2%. Patient remains comfortable and hemodynamically stable. Will continue to monitor patient. Pt already confirms that contraction pain has started to decrease in severity.      Preanesthetic Checklist  Completed: patient identified, IV checked, site marked, risks and benefits discussed, surgical/procedural consents, equipment checked, pre-op evaluation, timeout performed, anesthesia consent given, oxygen available, monitors applied/VS acknowledged, fire risk safety assessment completed and verbalized and blood product R/B/A discussed and consented

## 2022-09-27 NOTE — ANESTHESIA PRE PROCEDURE
Department of Anesthesiology  Preprocedure Note       Name:  Parker Garcia   Age:  25 y.o.  :  2000                                          MRN:  [id number]         Date:  2022      Surgeon: * No surgeons listed *    Procedure: * No procedures listed *    Medications prior to admission:   Prior to Admission medications    Medication Sig Start Date End Date Taking?  Authorizing Provider   Prenatal Vit-Fe Fumarate-FA (PRENATAL 1+1 PO) Take by mouth    Historical Provider, MD   ondansetron (ZOFRAN ODT) 4 MG disintegrating tablet Take 1 tablet by mouth every 8 hours as needed for Nausea or Vomiting  Patient not taking: No sig reported 20   [place] Dies, APRN - CNP       Current medications:    Current Facility-Administered Medications   Medication Dose Route Frequency Provider Last Rate Last Admin    lactated ringers infusion   IntraVENous Continuous Tanvir Merly, APRN - CNM        lactated ringers bolus  500 mL IntraVENous PRN Tanvir Merly, APRN - CNM        Or    lactated ringers bolus  1,000 mL IntraVENous PRN Tanvir Merly, APRN - CNM        sodium chloride flush 0.9 % injection 5-40 mL  5-40 mL IntraVENous 2 times per day Tanvir Merly, APRN - CNM        sodium chloride flush 0.9 % injection 5-40 mL  5-40 mL IntraVENous PRN Tanvir Merly, APRN - CNM        0.9 % sodium chloride infusion  25 mL IntraVENous PRN Tanvir Merly, APRN - CNM        [facility]ansetron [facility]) injection 4 mg  4 mg IntraVENous Q6H PRN Tanvir Merly, APRN - CNM        methylergonovine (METHERGINE) injection 200 mcg  200 mcg IntraMUSCular PRN Tanvir Merly, APRN - CNM        carboprost (HEMABATE) injection 250 mcg  250 mcg IntraMUSCular PRN Tanvir Merly, APRN - CNM        miSOPROStol (CYTOTEC) tablet 800 mcg  800 mcg Rectal PRN Tanvir Merly, APRN - CNM        tranexamic acid (CYKLOKAPRON) 1,000 mg in sodium chloride 0.9 % 100 mL IVPB  1,000 mg IntraVENous Once PRN Tanvir Merly, APRN - CNM        m)                                               BP Readings from Last 3 Encounters:   09/27/22 (!) 140/88   09/26/22 130/86   09/21/22 100/70       NPO Status:                                                                                 BMI:   Wt Readings from Last 3 Encounters:   09/27/22 171 lb (77.6 kg) (92 %, Z= 1.41)*   09/26/22 171 lb (77.6 kg) (92 %, Z= 1.41)*   09/21/22 172 lb (78 kg) (92 %, Z= 1.43)*     * Growth percentiles are based on CDC (Girls, 2-20 Years) data. Body mass index is 29.35 kg/m². CBC:   Lab Results   Component Value Date/Time    WBC 10.0 09/27/2022 03:30 AM    RBC 3.71 09/27/2022 03:30 AM    HGB 10.0 09/27/2022 03:30 AM    HCT 32.4 09/27/2022 03:30 AM    MCV 87.3 09/27/2022 03:30 AM    RDW 14.1 09/27/2022 03:30 AM     09/27/2022 03:30 AM       CMP:   Lab Results   Component Value Date/Time     09/07/2020 05:45 AM    K 4.1 09/07/2020 05:45 AM     09/07/2020 05:45 AM    CO2 23 09/07/2020 05:45 AM    BUN 12 09/07/2020 05:45 AM    CREATININE 0.6 09/07/2020 05:45 AM    GFRAA >59 09/07/2020 05:45 AM    LABGLOM >60 09/07/2020 05:45 AM    GLUCOSE 99 09/07/2020 05:45 AM    PROT 7.1 09/07/2020 05:45 AM    CALCIUM 9.5 09/07/2020 05:45 AM    BILITOT 0.4 09/07/2020 05:45 AM    ALKPHOS 68 09/07/2020 05:45 AM    AST 15 09/07/2020 05:45 AM    ALT 13 09/07/2020 05:45 AM       POC Tests: No results for input(s): POCGLU, POCNA, POCK, POCCL, POCBUN, POCHEMO, POCHCT in the last 72 hours.     Coags:   Lab Results   Component Value Date/Time    PROTIME 14.6 09/02/2020 08:08 PM    INR 1.14 09/02/2020 08:08 PM       HCG (If Applicable):   Lab Results   Component Value Date    PREGTESTUR Negative 02/13/2020        ABGs: No results found for: PHART, PO2ART, UTC9ZBH, FOX4RHA, BEART, M2JYKBDZ     Type & Screen (If Applicable):  No results found for: LABABO, LABRH    Drug/Infectious Status (If Applicable):  No results found for: HIV, HEPCAB    COVID-19 Screening (If Applicable):   Lab

## 2022-09-28 PROBLEM — Z3A.40 40 WEEKS GESTATION OF PREGNANCY: Status: ACTIVE | Noted: 2022-09-28

## 2022-09-28 LAB
HCT VFR BLD CALC: 30.4 % (ref 37–47)
HEMOGLOBIN: 9.4 G/DL (ref 12–16)
MCH RBC QN AUTO: 27.5 PG (ref 27–31)
MCHC RBC AUTO-ENTMCNC: 30.9 G/DL (ref 33–37)
MCV RBC AUTO: 88.9 FL (ref 81–99)
PDW BLD-RTO: 14.5 % (ref 11.5–14.5)
PLATELET # BLD: 227 K/UL (ref 130–400)
PMV BLD AUTO: 13.4 FL (ref 9.4–12.3)
RBC # BLD: 3.42 M/UL (ref 4.2–5.4)
WBC # BLD: 17.6 K/UL (ref 4.8–10.8)

## 2022-09-28 PROCEDURE — 2580000003 HC RX 258: Performed by: ADVANCED PRACTICE MIDWIFE

## 2022-09-28 PROCEDURE — S9443 LACTATION CLASS: HCPCS

## 2022-09-28 PROCEDURE — 36415 COLL VENOUS BLD VENIPUNCTURE: CPT

## 2022-09-28 PROCEDURE — 85027 COMPLETE CBC AUTOMATED: CPT

## 2022-09-28 PROCEDURE — 6370000000 HC RX 637 (ALT 250 FOR IP): Performed by: ADVANCED PRACTICE MIDWIFE

## 2022-09-28 PROCEDURE — 10907ZC DRAINAGE OF AMNIOTIC FLUID, THERAPEUTIC FROM PRODUCTS OF CONCEPTION, VIA NATURAL OR ARTIFICIAL OPENING: ICD-10-PCS | Performed by: ADVANCED PRACTICE MIDWIFE

## 2022-09-28 PROCEDURE — 99231 SBSQ HOSP IP/OBS SF/LOW 25: CPT | Performed by: ADVANCED PRACTICE MIDWIFE

## 2022-09-28 PROCEDURE — 0HQ9XZZ REPAIR PERINEUM SKIN, EXTERNAL APPROACH: ICD-10-PCS | Performed by: ADVANCED PRACTICE MIDWIFE

## 2022-09-28 PROCEDURE — 1220000000 HC SEMI PRIVATE OB R&B

## 2022-09-28 RX ORDER — ACETAMINOPHEN 325 MG/1
650 TABLET ORAL EVERY 4 HOURS PRN
Status: DISCONTINUED | OUTPATIENT
Start: 2022-09-28 | End: 2022-09-29 | Stop reason: HOSPADM

## 2022-09-28 RX ORDER — SODIUM CHLORIDE 0.9 % (FLUSH) 0.9 %
5-40 SYRINGE (ML) INJECTION PRN
Status: DISCONTINUED | OUTPATIENT
Start: 2022-09-28 | End: 2022-09-29 | Stop reason: HOSPADM

## 2022-09-28 RX ORDER — DOCUSATE SODIUM 100 MG/1
100 CAPSULE, LIQUID FILLED ORAL 2 TIMES DAILY
Status: DISCONTINUED | OUTPATIENT
Start: 2022-09-28 | End: 2022-09-29 | Stop reason: HOSPADM

## 2022-09-28 RX ORDER — SODIUM CHLORIDE 9 MG/ML
INJECTION, SOLUTION INTRAVENOUS PRN
Status: DISCONTINUED | OUTPATIENT
Start: 2022-09-28 | End: 2022-09-29 | Stop reason: HOSPADM

## 2022-09-28 RX ORDER — IBUPROFEN 400 MG/1
800 TABLET ORAL EVERY 8 HOURS PRN
Status: DISCONTINUED | OUTPATIENT
Start: 2022-09-28 | End: 2022-09-29 | Stop reason: HOSPADM

## 2022-09-28 RX ORDER — SODIUM CHLORIDE 0.9 % (FLUSH) 0.9 %
5-40 SYRINGE (ML) INJECTION EVERY 12 HOURS SCHEDULED
Status: DISCONTINUED | OUTPATIENT
Start: 2022-09-28 | End: 2022-09-29 | Stop reason: HOSPADM

## 2022-09-28 RX ORDER — FERROUS SULFATE 325(65) MG
325 TABLET ORAL 2 TIMES DAILY WITH MEALS
Status: DISCONTINUED | OUTPATIENT
Start: 2022-09-28 | End: 2022-09-29 | Stop reason: HOSPADM

## 2022-09-28 RX ORDER — SODIUM CHLORIDE, SODIUM LACTATE, POTASSIUM CHLORIDE, CALCIUM CHLORIDE 600; 310; 30; 20 MG/100ML; MG/100ML; MG/100ML; MG/100ML
INJECTION, SOLUTION INTRAVENOUS CONTINUOUS
Status: DISCONTINUED | OUTPATIENT
Start: 2022-09-28 | End: 2022-09-29 | Stop reason: HOSPADM

## 2022-09-28 RX ADMIN — FERROUS SULFATE TAB 325 MG (65 MG ELEMENTAL FE) 325 MG: 325 (65 FE) TAB at 07:57

## 2022-09-28 RX ADMIN — IBUPROFEN 800 MG: 400 TABLET, FILM COATED ORAL at 21:51

## 2022-09-28 RX ADMIN — DOCUSATE SODIUM 100 MG: 100 CAPSULE, LIQUID FILLED ORAL at 07:57

## 2022-09-28 RX ADMIN — DOCUSATE SODIUM 100 MG: 100 CAPSULE, LIQUID FILLED ORAL at 21:51

## 2022-09-28 RX ADMIN — Medication 10 ML: at 08:00

## 2022-09-28 ASSESSMENT — PAIN DESCRIPTION - LOCATION: LOCATION: ABDOMEN;BACK

## 2022-09-28 ASSESSMENT — PAIN - FUNCTIONAL ASSESSMENT: PAIN_FUNCTIONAL_ASSESSMENT: PREVENTS OR INTERFERES SOME ACTIVE ACTIVITIES AND ADLS

## 2022-09-28 ASSESSMENT — PAIN DESCRIPTION - ORIENTATION: ORIENTATION: LOWER

## 2022-09-28 ASSESSMENT — PAIN DESCRIPTION - DESCRIPTORS: DESCRIPTORS: ACHING;DISCOMFORT;SORE;TENDER

## 2022-09-28 ASSESSMENT — PAIN SCALES - GENERAL: PAINLEVEL_OUTOF10: 4

## 2022-09-28 NOTE — L&D DELIVERY NOTE
Mother's Information      Labor Events     Labor?: No  Cervical Ripening:   Now               Harmony, Baby Boy Bindu Murphy [[id number]]      Labor Events     Labor?: No   Steroids?: None  Cervical Ripening Date/Time:     Antibiotics Received during Labor?: Yes  Rupture Date/Time: 22 11:42:00   Rupture Type: AROM, Intact  Fluid Color: Clear  Fluid Odor: None  Fluid Volume: None  Augmentation: AROM, Oxytocin  Labor Complications: None       Anesthesia    Method: Local, Epidural       Start Pushing      Labor onset date/time:   Now     Dilation complete date/time: 22 18:30:00 CDT Now     Start pushing date/time: 2022 19:20:00   Decision date/time (emergent ):           Labor Length    2nd stage: 2h 01m  3rd stage: 0h 03m       Delivery (Claude)      Delivery Date/Time:  22 20:31:00   Delivery Type: Vaginal, Spontaneous    Details:             Presentation    Presentation: Vertex  Position: Middle  _: Occiput  _: Anterior       Shoulder Dystocia    Shoulder Dystocia Present?: No  Add Second Maneuver  Add Third Maneuver  Add Fourth Maneuver  Add Fifth Maneuver  Add Sixth Maneuver  Add Seventh Maneuver  Add Eighth Maneuver  Add Ninth Maneuver       Assisted Delivery Details    Forceps Attempted?: No  Vacuum Extractor Attempted?: No       Document Additional Attempt         Document Additional Attempt                 Cord    Vessels: 3 Vessels  Complications: None  Delayed Cord Clamping?: Yes  Cord Clamped Date/Time: 2022 20:33:00  Cord Blood Disposition: Discard  Gases Sent?: No       Placenta    Date/Time: 2022 20:34:00  Removal: Spontaneous  Appearance: Intact       Lacerations    Episiotomy: None  Perineal Lacerations: 1st  Number of Repair Packets: 2       Vaginal Counts    Initial Count Personnel: VIBHA CAZARES  Initial Count Verified By: Vibha Schaffer RN    Sponges Needles Instruments   Initial Counts Correct Correct Correct   Final Counts Correct Correct Correct   Final Count Personnel: Sherrie Mccann  Final Count Verified By: Lavern RUBION RN  Accurate Final Count?: Yes  If the count is incorrect due to Intentionally Retained Foreign Object (IRFO) add the IRFO LDA in Lines/Drains. Add LDA: Link to [facility]       Blood Loss  Mother: Anna Lopez #[id number]     Start of Mother's Information      Delivery Blood Loss  22 0831 - 22 0821      Quantitative Blood Loss (mL) Hospital Encounter 200 grams    Total  200 mL               End of Mother's Information  Mother: Anna Lopez #[id number]                Delivery Providers    Delivering clinician: SWYF, APRN - CNM     Provider Role     Obstetrician    Gina Elizabeth, RN Primary Nurse    Feli , RN Primary Ward Nurse     NICU Nurse     Neonatologist     Anesthesiologist    Clau Patel, APRN - CRNA Nurse Anesthetist     Nurse Practitioner    [facility] Niki, APRN - CNM Midwife     Nursery Nurse              Ward Assessment    Living Status: Living     Apgar Scoring Key:    0 1 2    Skin Color: Blue or pale Acrocyanotic Completely pink    Heart Rate: Absent <100 bpm >100 bpm    Reflex Irritability: No response Grimace Cry or active withdrawal    Muscle Tone: Limp Some flexion Active motion    Respiratory Effort: Absent Weak cry; hypoventilation Good, crying                      Skin Color:   Heart Rate:   Reflex Irritability:   Muscle Tone:   Respiratory Effort:    Total:            1 Minute:    0    2    2    2    2    8        Apgar 1 total from OB History    5 Minute:    1    2    2    2    2    9        Apgar 5 total from OB History    10 Minute:              15 Minute:              20 Minute:                        Apgars Assigned By: TERESA DEL RIO              Resuscitation    Method: Bulb Suction, Room Air, Stimulation              Measurements      Birth Weight: 4139 g Birth Length: 0.54 m     Head Circumference: 0.362 m               Title      Skin to Skin Initiation Date/Time: 9/27/22 20:31:00 CDT     Skin to Skin With: Mother     Skin to Skin End Date/Time:

## 2022-09-28 NOTE — ANESTHESIA POSTPROCEDURE EVALUATION
Department of Anesthesiology  Postprocedure Note    Patient: Antoni Suarez  MRN: [id number]  YOB: 2000  Date of evaluation: 9/27/2022      Procedure Summary     Date: 09/27/22 Room / Location:     Anesthesia Start: 6366 Anesthesia Stop: 2031    Procedure: Labor Analgesia Diagnosis:     Scheduled Providers:  Responsible Provider: JOHN Henderson CRNA    Anesthesia Type: epidural ASA Status: 1          Anesthesia Type: No value filed.     Jose A Phase I:      Jose A Phase II:        Anesthesia Post Evaluation    Patient location during evaluation: bedside  Patient participation: complete - patient participated  Level of consciousness: awake  Pain score: 0  Airway patency: patent  Nausea & Vomiting: no nausea and no vomiting  Complications: no  Cardiovascular status: hemodynamically stable  Respiratory status: spontaneous ventilation and room air  Hydration status: euvolemic  Multimodal analgesia pain management approach

## 2022-09-28 NOTE — PROGRESS NOTES
[facility]) OB/GYN  Progress Note    Subjective:     Postpartum Day 1: Vaginal Delivery    Patient is a  The patient feels well. The patient denies emotional concerns. Pain is well controlled with current medications. The baby iswell. Baby is feeding via breast. Urinary output is adequate. The patient is ambulating well. The patient is tolerating a normal diet. Flatus has been passed. Objective:      Patient Vitals for the past 8 hrs:   BP Temp Temp src Pulse Resp SpO2   22 0113 122/84 98.3 °F (36.8 °C) Temporal 99 18 99 %     General:    alert, appears stated age, and cooperative   Bowel Sounds:  active   Lochia:  appropriate   Uterine Fundus:   firm   Episiotomy/lac:  healing well, no significant drainage, no dehiscence, no significant erythema   DVT Evaluation:  No evidence of DVT seen on physical exam.     Lab Results   Component Value Date    WBC 10.0 2022    HGB 10.0 (L) 2022    HCT 32.4 (L) 2022    MCV 87.3 2022     2022     Assessment:     Status post vaginal delivery. doing well post vaginal    Plan:     Continue current care. Over 50% of the total visit time of 20 minutes was spent on counseling and/or coordination of care of post partum care, feeding instructions, importance of ambulation, and wound care instructions. All questions were answered and the patient voiced understanding.

## 2022-09-28 NOTE — LACTATION NOTE
Infant Name: Camilla  Gestation: 40.0  Day of Life: 1  Birth weight: 9-2 lb (4139g)  Today's weight:  Weight loss:  24 hour summary of feeds: breastfeeding attempt x 1, EBM x 1, formula x 4   Voids:  Stools:  Assistive device: none  Maternal History: , anxiety, tonsillectomy, adenoidectomy, tympanostomy tube placement  Maternal Medications: zofran, PNV  Maternal Goal: breastfeed some, pump and formula feed baby  Breast pump for home: yes    Encouraged mother to start pumping. Hospital grade electric pump provided. Instructions for set up and cleaning given. Hand expression taught and demonstrated. Breast compression encouraged to increase milk transfer while pumping. Instructed to pump for 15 mins giving baby every drop of colostrum. Information given discussing cleaning, drying and storing. Medela handout given discussing Going Back to Work and tips for success. Medela Breastmilk collection and storage, guidelines for healthy newborns given along with information for preventing engorgement. Information and Lactation Education Resources handouts given discussing Increasing Your Breastmilk Supply, Plugged Ducts and Mastitis, and selecting a Breast Pump. Pumping Log given so mother can record and keep up with feedings. Breastfeeding book given. Discussed the benefits of colostrum, skin to skin and the importance of good positioning and latch. Informed mother that baby can be very sleepy the first 24 hours and typically the 2nd night babies will be more awake and want to feed a lot and that this is normal and important in establishing milk supply. Encouraged mother to call out for help if needed. All questions and concerns answered. Encouragement and support provided.

## 2022-09-28 NOTE — PROGRESS NOTES
Patient experienced episode of incontinence while walking to the bathroom. Patient stated that she was concerned it was blood that came out of her, but that it smelled and looked like urine. It was urine, not blood.

## 2022-09-29 VITALS
RESPIRATION RATE: 18 BRPM | TEMPERATURE: 98 F | OXYGEN SATURATION: 94 % | BODY MASS INDEX: 29.19 KG/M2 | DIASTOLIC BLOOD PRESSURE: 67 MMHG | SYSTOLIC BLOOD PRESSURE: 125 MMHG | HEART RATE: 84 BPM | WEIGHT: 171 LBS | HEIGHT: 64 IN

## 2022-09-29 LAB — RPR: NORMAL

## 2022-09-29 PROCEDURE — 99239 HOSP IP/OBS DSCHRG MGMT >30: CPT | Performed by: NURSE PRACTITIONER

## 2022-09-29 PROCEDURE — 6370000000 HC RX 637 (ALT 250 FOR IP): Performed by: ADVANCED PRACTICE MIDWIFE

## 2022-09-29 PROCEDURE — S9443 LACTATION CLASS: HCPCS

## 2022-09-29 RX ORDER — FERROUS SULFATE 325(65) MG
325 TABLET ORAL 2 TIMES DAILY WITH MEALS
Qty: 30 TABLET | Refills: 3 | Status: SHIPPED | OUTPATIENT
Start: 2022-09-29

## 2022-09-29 RX ORDER — PSEUDOEPHEDRINE HCL 30 MG
100 TABLET ORAL 2 TIMES DAILY
Qty: 30 CAPSULE | Refills: 0 | Status: SHIPPED | OUTPATIENT
Start: 2022-09-29

## 2022-09-29 RX ORDER — IBUPROFEN 800 MG/1
800 TABLET ORAL EVERY 8 HOURS PRN
Qty: 30 TABLET | Refills: 0 | Status: SHIPPED | OUTPATIENT
Start: 2022-09-29

## 2022-09-29 RX ADMIN — FERROUS SULFATE TAB 325 MG (65 MG ELEMENTAL FE) 325 MG: 325 (65 FE) TAB at 12:33

## 2022-09-29 RX ADMIN — DOCUSATE SODIUM 100 MG: 100 CAPSULE, LIQUID FILLED ORAL at 12:33

## 2022-09-29 RX ADMIN — IBUPROFEN 800 MG: 400 TABLET, FILM COATED ORAL at 12:33

## 2022-09-29 ASSESSMENT — PAIN DESCRIPTION - LOCATION: LOCATION: ABDOMEN

## 2022-09-29 ASSESSMENT — PAIN SCALES - GENERAL
PAINLEVEL_OUTOF10: 2
PAINLEVEL_OUTOF10: 3

## 2022-09-29 ASSESSMENT — PAIN DESCRIPTION - DESCRIPTORS: DESCRIPTORS: CRAMPING

## 2022-09-29 NOTE — LACTATION NOTE
Infant Name: Camilla  Gestation: 40.0  Day of Life: 2  Birth weight: 9-2 lb (4139g)  Today's weight: 8-12.2 lb (3975g)  Weight loss: -4%  24 hour summary of feeds: breastfeeding x 1, EBM x 1, formula x 7  Voids: 4  Stools: 3  Assistive device: none  Maternal History: , anxiety, tonsillectomy, adenoidectomy, tympanostomy tube placement  Maternal Medications: zofran, PNV  Maternal Goal: breastfeed some, pump and formula feed baby  Breast pump for home: yes    Encouraged mother to start pumping when she gets home with her electric pump. Instructions for set up and cleaning given. Hand expression taught and demonstrated. Breast compression encouraged to increase milk transfer while pumping. Instructed to pump for 15 mins giving baby every drop of colostrum. Information given discussing cleaning, drying and storing. Medela handout given discussing Going Back to Work and tips for success. Medela Breastmilk collection and storage, guidelines for healthy newborns given along with information for preventing engorgement. Information and Lactation Education Resources handouts given discussing Increasing Your Breastmilk Supply, Plugged Ducts and Mastitis, and selecting a Breast Pump. Pumping Log given so mother can record and keep up with feedings. Breastfeeding book given. Mother and baby will be discharged home today,weight check to follow. Instructions and handouts given over management of sore nipples, engorgement, plugged ducts, mastitis, hydration, nutrition, and medications that could effect milk supply. Mother knows when to call MD if needed. All questions answered.  Lactation number provided

## 2022-09-29 NOTE — FLOWSHEET NOTE
Pt scored 13 on PPD scale. Tried contacting provider with no success. Patient stated she had antidepressant prescription and medication at home that she plans to start taking. Will continue to contact provider.

## 2022-09-29 NOTE — DISCHARGE INSTRUCTIONS
frequency, burning, or urgency to the doctor. Bowel Patterns:  1. You should have a normal stool by 2nd or 3rd day after birth. 2. Drinking fluids, walking, and eating roughage help promote regular stools. Hemorrhoid Care:  1. Take 20-minute sitz bath 3-4 times daily. 2. Use TUCKS. 3. Maintain adequate fluid intake. 4. Avoid prolonged sitting. Hygiene:  1. Apply stacy pads from front to back. 2. Always clean from front to back. 3. Shower daily. Nutrition/Fluids:  1. A weight loss of 10-12 pounds after birth is normal.  2. It may take from 4 weeks to several months to reach your pre-pregnant state. 3. After weight loss stabilizes, weight loss should not exceed a pound per week. Rest/Sleep:  1. Rest as much as possible. 2. Your first week at home should consist of taking care of yourself. Activity:  1. Limit the following activities for 6 weeks. Heavy lifting, nothing heavier than 10-15 pounds for first few weeks  No douching, tampons or intercourse for 6 weeks. Wearing constrictive garments around abdomen or legs. 2. May drive in 1-2 weeks or as directed by doctor. 3. Start post-partum exercises. 4. Increased discharge or pain may mean you need to decrease activities. Emotions: You may become depressed, let down or have mood swings and feel weepy. The loss of an infant is a traumatic life event and can have a significant impact on your health. You may experience a roller coaster of emotions such as numbness, disbelief, anger, sadness, depression, and difficulty concentrating. Respect your needs and limitations as you work through your grief and begin to heal. Healing doesnt mean forgetting or making memories insignificant. Allow yourself plenty of time to grieve and the opportunity to remember. Sexuality:  1. You many have vaginal dryness for first few weeks. May use KY Jelly.   2. You may have difficulty feeling excitement because of fatigue due to recovery and demands of  care. 3. There may be a leakage of breast milk during sexual activity. (You may need to wear a bra.)  4. Vaginal tenderness-use the female superior or [id number] position  5. Resume intercourse-abstain from intercourse until episiotomy is healed and discharge has stopped (or as directed by your doctor). Contraception:  1. Use contraception as desired and as discussed with your doctor. Resources  Pregnancy and Infant Loss and Grief  [employer]nz  HelpLine: 1.916.599.1183    Share Pregnancy & Infant Loss Support  Touching Lives, Healing Hearts & Giving Hope  [email or web address]

## 2022-09-29 NOTE — PLAN OF CARE
Problem: ABCDS Injury Assessment  Goal: Absence of physical injury  Outcome: Progressing     Problem: Pain  Goal: Verbalizes/displays adequate comfort level or baseline comfort level  Outcome: Progressing  Flowsheets (Taken 9/28/2022 2151)  Verbalizes/displays adequate comfort level or baseline comfort level:   Encourage patient to monitor pain and request assistance   Assess pain using appropriate pain scale   Administer analgesics based on type and severity of pain and evaluate response   Implement non-pharmacological measures as appropriate and evaluate response     Problem: Infection - Adult  Goal: Absence of infection at discharge  Outcome: Progressing  Flowsheets (Taken 9/28/2022 2151)  Absence of infection at discharge: Assess and monitor for signs and symptoms of infection  Goal: Absence of infection during hospitalization  Outcome: Progressing  Flowsheets (Taken 9/28/2022 2151)  Absence of infection during hospitalization: Assess and monitor for signs and symptoms of infection  Goal: Absence of fever/infection during anticipated neutropenic period  Outcome: Progressing  Flowsheets (Taken 9/28/2022 2151)  Absence of fever/infection during anticipated neutropenic period: Monitor white blood cell count     Problem: Safety - Adult  Goal: Free from fall injury  Outcome: Progressing     Problem: Discharge Planning  Goal: Discharge to home or other facility with appropriate resources  Outcome: Progressing

## 2022-09-29 NOTE — DISCHARGE SUMMARY
Subjective:     Postpartum Day 2: Vaginal Delivery    Patient is a  The patient feels well. The patient denies emotional concerns. Pain is well controlled with current medications. The baby iswell. Baby is feeding via breast. Urinary output is adequate. The patient is ambulating well. The patient is tolerating a normal diet. Flatus has been passed. Objective:      Patient Vitals for the past 8 hrs:   BP Temp Temp src Pulse Resp SpO2   22 0600 132/88 98 °F (36.7 °C) Temporal 88 18 99 %     General:    alert, appears stated age, and cooperative   Bowel Sounds:  active   Lochia:  appropriate   Uterine Fundus:   firm   Episiotomy:  healing well, no significant drainage, no dehiscence, no significant erythema   DVT Evaluation:  No evidence of DVT seen on physical exam.     Lab Results   Component Value Date    WBC 17.6 (H) 2022    HGB 9.4 (L) 2022    HCT 30.4 (L) 2022    MCV 88.9 2022     2022     Assessment:     Status post vaginal delivery. doing well post vaginal    Plan:     Discharge home with standard precautions and return to clinic in 6 weeks.

## 2022-10-20 ENCOUNTER — POSTPARTUM VISIT (OUTPATIENT)
Dept: OBGYN CLINIC | Age: 22
End: 2022-10-20
Payer: MEDICAID

## 2022-10-20 VITALS
HEIGHT: 64 IN | HEART RATE: 72 BPM | BODY MASS INDEX: 25.1 KG/M2 | WEIGHT: 147 LBS | SYSTOLIC BLOOD PRESSURE: 127 MMHG | DIASTOLIC BLOOD PRESSURE: 85 MMHG

## 2022-10-20 DIAGNOSIS — Z13.31 POSITIVE DEPRESSION SCREENING: ICD-10-CM

## 2022-10-20 PROBLEM — Z3A.40 40 WEEKS GESTATION OF PREGNANCY: Status: RESOLVED | Noted: 2022-09-28 | Resolved: 2022-10-20

## 2022-10-20 PROBLEM — O42.90 PREMATURE RUPTURE OF MEMBRANES: Status: RESOLVED | Noted: 2022-09-27 | Resolved: 2022-10-20

## 2022-10-20 PROCEDURE — 1036F TOBACCO NON-USER: CPT | Performed by: ADVANCED PRACTICE MIDWIFE

## 2022-10-20 PROCEDURE — G8484 FLU IMMUNIZE NO ADMIN: HCPCS | Performed by: ADVANCED PRACTICE MIDWIFE

## 2022-10-20 PROCEDURE — 99213 OFFICE O/P EST LOW 20 MIN: CPT | Performed by: ADVANCED PRACTICE MIDWIFE

## 2022-10-20 PROCEDURE — G8427 DOCREV CUR MEDS BY ELIG CLIN: HCPCS | Performed by: ADVANCED PRACTICE MIDWIFE

## 2022-10-20 PROCEDURE — 1111F DSCHRG MED/CURRENT MED MERGE: CPT | Performed by: ADVANCED PRACTICE MIDWIFE

## 2022-10-20 PROCEDURE — S3005 EVAL SELF-ASSESS DEPRESSION: HCPCS | Performed by: ADVANCED PRACTICE MIDWIFE

## 2022-10-20 PROCEDURE — G8419 CALC BMI OUT NRM PARAM NOF/U: HCPCS | Performed by: ADVANCED PRACTICE MIDWIFE

## 2022-10-20 ASSESSMENT — ENCOUNTER SYMPTOMS
EYES NEGATIVE: 1
RESPIRATORY NEGATIVE: 1
GASTROINTESTINAL NEGATIVE: 1
ALLERGIC/IMMUNOLOGIC NEGATIVE: 1

## 2022-10-20 NOTE — PROGRESS NOTES
[facility] OB/GYN  CNM Office Note    Lena Grace is a 25 y.o. female who presents today for her medical conditions/ complaints as noted below. Chief Complaint   Patient presents with    Postpartum Care     2 week PP; patient delivered vaginally on 2022    Contraception     Desires the nexplanon         HPI  [facility] presents for 2 week PP visit. She desires nexplanon for contraception. Minimal bleeding, pain controlled, mood stable but positive depression screen EPDS 1430. Patient Active Problem List   Diagnosis    Premature rupture of membranes     (normal spontaneous vaginal delivery)    40 weeks gestation of pregnancy       No LMP recorded (lmp unknown). (Menstrual status: Postpartum). [id number]    Past Medical History:   Diagnosis Date    Anxiety      Past Surgical History:   Procedure Laterality Date    ADENOIDECTOMY      TONSILLECTOMY      TYMPANOSTOMY TUBE PLACEMENT       History reviewed. No pertinent family history.   Social History     Tobacco Use    Smoking status: Never     Passive exposure: Yes    Smokeless tobacco: Never   Substance Use Topics    Alcohol use: No       Current Outpatient Medications   Medication Sig Dispense Refill    phenylephrine-mineral oil-petrolatum (PREPARATION H) 0.25-14-74.9 % rectal ointment Place rectally every 2 hours as needed for Hemorrhoids (Irritation, For perineal pain or discomfort, Apply to perineal area) (Patient not taking: Reported on 10/20/2022) 1 each 0    ibuprofen (ADVIL;MOTRIN) 800 MG tablet Take 1 tablet by mouth every 8 hours as needed for Pain (Patient not taking: Reported on 10/20/2022) 30 tablet 0    ferrous sulfate (IRON 325) 325 (65 Fe) MG tablet Take 1 tablet by mouth 2 times daily (with meals) (Patient not taking: Reported on 10/20/2022) 30 tablet 3    docusate sodium (COLACE, DULCOLAX) 100 MG CAPS Take 100 mg by mouth 2 times daily (Patient not taking: Reported on 10/20/2022) 30 capsule 0    Prenatal Vit-Fe Fumarate-FA (PRENATAL 1+1 PO) Take by mouth       No current facility-administered medications for this visit. No Known Allergies  Vitals:    10/20/22 0958   BP: 127/85   Site: Right Upper Arm   Position: Sitting   Cuff Size: Medium Adult   Pulse: 72   Weight: 147 lb (66.7 kg)   Height: 5' 4\" (1.626 m)     Body mass index is 25.23 kg/m². Review of Systems   Constitutional: Negative. HENT: Negative. Eyes: Negative. Respiratory: Negative. Cardiovascular: Negative. Gastrointestinal: Negative. Endocrine: Negative. Genitourinary: Negative. Musculoskeletal: Negative. Skin: Negative. Allergic/Immunologic: Negative. Neurological: Negative. Hematological: Negative. Psychiatric/Behavioral:  Positive for dysphoric mood. Physical Exam  Constitutional:       Appearance: Normal appearance. She is well-developed. She is not diaphoretic. HENT:      Head: Normocephalic and atraumatic. Nose: Nose normal.   Eyes:      Extraocular Movements: Extraocular movements intact. Conjunctiva/sclera: Conjunctivae normal.      Pupils: Pupils are equal, round, and reactive to light. Neck:      Thyroid: No thyromegaly. Trachea: No tracheal deviation. Pulmonary:      Effort: Pulmonary effort is normal. No respiratory distress. Musculoskeletal:         General: Normal range of motion. Cervical back: Normal range of motion and neck supple. Comments: Normal ROM for upper and lower extremities. Gait steady. Skin:     General: Skin is warm and dry. Findings: No lesion or rash. Neurological:      Mental Status: She is alert and oriented to person, place, and time. Psychiatric:         Mood and Affect: Mood normal.         Speech: Speech normal.         Behavior: Behavior normal.        Diagnosis Orders   1. 2 weeks postpartum follow-up  VA EVAL SELF-ASSESS DEPRESSION      2. Postpartum care following vaginal delivery        3.  Positive depression screening MEDICATIONS:  No orders of the defined types were placed in this encounter. ORDERS:  Orders Placed This Encounter   Procedures    [place] EVAL SELF-ASSESS DEPRESSION       PLAN:  PP - Doing well, continue mgmt plan. Plan nexplanon in 4 weeks. Positive depression screen. - Start zoloft as prescribed, f/u in 2 weeks  3.    Contraceptio - plan nexplanon insertion

## 2022-10-20 NOTE — PATIENT INSTRUCTIONS
Patient Education        After Pregnancy: Exercises  Introduction  Here are some examples of exercises that can help after pregnancy. Start each exercise slowly. Ease off the exercise if you start to have pain. Your doctor or physical therapist will tell you when you can start these exercises and which ones will work best for you. How to do the exercises  Tummy tuck  Lie on your back, and place two fingers just inside your hip bones so you can feel your lower belly muscles. Take a deep breath in. As you breathe out, pull your belly button in toward your spine, as if you are trying to zip up a tight pair of jeans. You should feel your lower belly muscles pull slightly away from your fingers as the muscles tighten. Hold for about 6 seconds, but do not hold your breath. Repeat 8 to 12 times. Repeat several times a day, and try to hold your lower belly muscles in for longer as you get stronger. Practice doing this exercise while you are standing, such as when you are standing in line, or sitting. Heel slides  Lie on the floor with your knees bent, and place two fingers just inside your hip bones so you can feel your lower belly muscles. Your feet should be flat on the floor. Pull your belly button in toward your spine. You should feel your lower belly muscles pull slightly away from your fingers as the muscles tighten. Keep holding your belly button in as you slowly slide one foot along the floor until your leg is out straight. Slowly slide the leg back to your starting position while making sure that you keep holding your belly button in. Do not arch or move your back as you are doing this. Do not hold your breath. Relax and repeat with your other leg. Repeat 8 to 12 times. Knee-to-chest stretch  Lie on your back with one knee bent and the other leg straight. Clasp your hands together under your bent knee and bring the knee to your chest. Keep your lower back pressed to the floor.   If it hurts your back to keep your opposite leg straight as you stretch, bend that knee too, and keep that foot flat on the floor. Hold for at least 15 to 30 seconds. Relax and lower the knee to the starting position. Repeat with the other leg. Repeat 2 to 4 times with each leg. Neck rotation  Sit in a firm chair, or stand up straight. Keeping your chin level, turn your head to the right, and hold for 15 to 30 seconds. Turn your head to the left and hold for 15 to 30 seconds. Repeat 2 to 4 times to each side. Shoulder rolls  Sit comfortably with your feet shoulder-width apart. You can also do this exercise while standing. Roll your shoulders up, then back, and then down in a smooth, circular motion. Repeat 2 to 4 times. Midback stretch  If you have knee pain, do not do this exercise. Kneel on the floor, and sit back on your ankles. Lean forward, place your hands on the floor, and stretch your arms out in front of you. Rest your head between your arms. Gently push your chest toward the floor, reaching as far in front of you as possible. Hold for 15 to 30 seconds. Repeat 2 to 4 times. Back stretches  Get down on your hands and knees on the floor. Relax your head and allow it to droop. Round your back up toward the ceiling until you feel a nice stretch in your upper, middle, and lower back. Hold this stretch for as long as it feels comfortable, or about 15 to 30 seconds. Return to the starting position with a flat back while you are on your hands and knees. Let your back sway by pressing your stomach toward the floor. Lift your buttocks toward the ceiling. Hold this position for 15 to 30 seconds. Repeat 2 to 4 times. Hamstring stretch (lying down)  Lie flat on your back with your legs straight. If you feel discomfort in your back, place a small towel roll under your lower back. Holding the back of your leg, lift your leg straight up and toward your body until you feel a stretch at the back of your thigh.   Hold the stretch for at least 30 seconds. Repeat 2 to 4 times. Switch legs and repeat steps 1 through 4. Calf stretch  Stand facing a wall with your hands on the wall at about eye level. Put your leg about a step behind your other leg. Keeping your back leg straight and your back heel on the floor, bend your front knee and gently bring your hip and chest toward the wall until you feel a stretch in the calf of your back leg. Hold the stretch for 15 to 30 seconds. Repeat 2 to 4 times. Repeat steps 1 through 4, but this time keep your back knee bent. Switch legs and repeat steps 1 through 5. Follow-up care is a key part of your treatment and safety. Be sure to make and go to all appointments, and call your doctor if you are having problems. It's also a good idea to know your test results and keep a list of the medicines you take. Current as of: February 23, 2022               Content Version: 13.4  © 2006-2022 CX. Care instructions adapted under license by [facility] ([facility]). If you have questions about a medical condition or this instruction, always ask your healthcare professional. [place] [address] any warranty or liability for your use of this information. Patient Education        Kegel Exercises: Care Instructions  Overview     Kegel exercises strengthen muscles around the bladder. These muscles control the flow of urine. Kegel exercises are sometime called \"pelvic floor\" exercises. They can help prevent urine leakage and keep the pelvic organs in place. Kegel exercises can strengthen pelvic muscles that have been weakened by age, pregnancy, childbirth, and surgery. They may help prevent or treat urine leakage. You do Kegel exercises by squeezing your pelvic floor muscles. You will likely need to do these exercises for several weeks to get better. Follow-up care is a key part of your treatment and safety.  Be sure to make and go to all appointments, and call your doctor if you are having problems. It's also a good idea to know your test results and keep a list of the medicines you take. How can you care for yourself at home? To do Kegel exercises:  Squeeze your muscles as if you were trying not to pass gas. Or squeeze your muscles as if you were stopping the flow of urine. Your belly, legs, and buttocks shouldn't move. Hold the squeeze for 3 seconds, then relax for 5 to 10 seconds. Start with 3 seconds, then add 1 second each week until you are able to squeeze for 10 seconds. Repeat the exercise 10 times a session. Do 3 to 8 sessions a day. When learning what muscles to squeeze, you can try stopping the flow of urine a few times. But don't make it a practice to do Kegels while urinating. If doing these exercises causes pain, stop doing them and talk with your doctor. Sometimes people have pelvic floor muscles that are too tight. In these cases, doing Kegel exercises may cause more problems. Check with your doctor if you don't notice a difference after trying these exercises for several weeks. Your doctor may suggest getting help from a physical therapist or recommend other treatment. Where can you learn more? Go to https://Sekal AS.Arcot Systems. org and sign in to your "MeetMe[employer] account. Enter R742 in the Smile box to learn more about \"Kegel Exercises: Care Instructions. \"     If you do not have an account, please click on the \"Sign Up Now\" link. Current as of: June 16, 2022               Content Version: 13.4  © 2006-2022 Healthwise, Incorporated. Care instructions adapted under license by [facility] If you have questions about a medical condition or this instruction, always ask your healthcare professional. [place] [address] any warranty or liability for your use of this information.        Patient Education        Nutrition for Breastfeeding: Care Instructions  Overview     If you are breastfeeding, your doctor may suggest that you eat more calories each day than otherwise recommended for a person of your height and weight. Breastfeeding helps build the bond between you and your baby. It gives your baby excellent health benefits. A healthy diet includes eating a variety of foods from the basic food groups: grains, vegetables, fruits, milk and milk products (such as cheese and yogurt), and meat and dried beans. Eating well during breastfeeding will ensure that you stay healthy. Follow-up care is a key part of your treatment and safety. Be sure to make and go to all appointments, and call your doctor if you are having problems. It's also a good idea to know your test results and keep a list of the medicines you take. How can you care for yourself at home? Making good choices about what you eat and drink when you are breastfeeding can help you stay healthy. It can also help your baby stay healthy. Here are some things you can do. Eat a variety of healthy foods. This includes vegetables, fruits, milk products, whole grains, and protein. Drink plenty of fluids. Make water your first choice. If you have kidney, heart, or liver disease and have to limit fluids, talk with your doctor before you increase your fluids. Avoid fish with high mercury. This includes shark, swordfish, nikhil mackerel, and marlin. It also includes orange roughy, bigeye tuna, and tilefish from the [facility]. Instead, eat fish that is low in mercury. Choose canned light tuna, salmon, pollock, catfish, or shrimp. Limit caffeine. Things like coffee, tea, chocolate, and some sodas can contain caffeine. Caffeine can pass through breast milk to your baby. It may cause fussiness and sleep problems. Talk to your doctor about how much caffeine is safe for you. Limit alcohol. Alcohol can pass through breast milk to your baby. Talk to your doctor if you have questions about drinking alcohol while breastfeeding. Be safe with supplements.   Talk with your doctor before taking any vitamins, minerals, and herbal or other dietary supplements. When should you call for help? Watch closely for changes in your health, and be sure to contact your doctor if you have any problems. Where can you learn more? Go to https://arnoldo.Curvo. org and sign in to your Cloud9 IDE account. Enter P234 in the Space-Time Insight box to learn more about \"Nutrition for Breastfeeding: Care Instructions. \"     If you do not have an account, please click on the \"Sign Up Now\" link. Current as of: May 9, 2022               Content Version: 13.4  © 2006-2022 opvizor. Care instructions adapted under license by [facility] ([facility]). If you have questions about a medical condition or this instruction, always ask your healthcare professional. [address] any warranty or liability for your use of this information. Patient Education        Postpartum Breast Care When You Don't Plan to Breastfeed: Care Instructions  Overview  Your breasts will start to make milk in the first couple of days after you give birth. This happens even if you don't breastfeed. You may have some milk leak from your breasts, and your breasts may feel sore and swollen. This is called engorgement. It usually gets better after several days. Over time, your body will stop making milk if you don't breastfeed or pump. This can take up to several weeks. You can take steps at home to decrease your discomfort and help your breasts stop making milk. Follow-up care is a key part of your treatment and safety. Be sure to make and go to all appointments, and call your doctor if you are having problems. It's also a good idea to know your test results and keep a list of the medicines you take. How can you care for yourself at home? Don't pump or remove milk from your breasts by hand. Wear a bra that fits well and provides good support.  You may find that it helps to wear a bra even while you sleep. Apply a cold pack to your breasts for 15 minutes at a time every hour as needed. You can use a frozen wet towel, a cold pack, or a bag of frozen vegetables. To prevent damage to your skin, put a thin cloth between the cold pack and your skin. Take ibuprofen (such as Advil or Motrin) to reduce pain and swelling. Be safe with medicines. Read and follow all instructions on the label. When should you call for help? Call your doctor now or seek immediate medical care if:    You have symptoms of a breast infection, such as: Increased pain, swelling, redness, or warmth around a breast.  Red streaks leading from your breast.  Pus draining from your breast.  A fever. Watch closely for changes in your health, and be sure to contact your doctor if:    You do not get better as expected. Where can you learn more? Go to https://AirKastpeSwypeShieldeb.Need Fixed. org and sign in to your ZarthCode account. Enter B385 in the Collections box to learn more about \"Postpartum Breast Care When You Don't Plan to Breastfeed: Care Instructions. \"     If you do not have an account, please click on the \"Sign Up Now\" link. Current as of: February 23, 2022               Content Version: 13.4  © 2006-2022 Adways Inc[employer] Care instructions adapted under license by [facility] ([facility]). If you have questions about a medical condition or this instruction, always ask your healthcare professional. [place] [address] any warranty or liability for your use of this information. Patient Education        Depression After Childbirth: Care Instructions  It's common to lose sleep, feel irritable, and cry easily during the first few days after childbirth. Hormone changes and the demands of a new baby can cause these \"baby blues. \" If these mood changes last more than 2 weeks, you may have postpartum depression. This is a medical condition that requires treatment.   If you have any of these signs, you may be depressed. See your doctor right away. You feel very sad or hopeless and lose interest in daily activities. You sleep too much or not enough. You feel tired or as if you have no energy. You eat too much or too little. You write or talk about death. Where to get help 24 hours a day, 7 days a week   If you or someone you know talks about suicide, self-harm, a mental health crisis, a substance use crisis, or any other kind of emotional distress, get help right away. You can:   Call the Suicide and Crisis Lifeline at 65. Call 9-555-140-TALK (). 4-558.610.6317    Text HOME to 951008 to access the Crisis Text Line. Consider saving these numbers in your phone. What you can do   Try to go to all of your counseling sessions. Take medicines as directed. Eat healthy foods. Get daily exercise, such as walks. Try to get some sunlight every day. Avoid using alcohol or other substances. Get as much rest as possible. Connect with friends, and join a support group for new parents. When should you call for help? Call 911 if: You feel you cannot stop from hurting yourself, your baby, or someone else. Call your doctor now or seek immediate medical care if:    You are having trouble caring for yourself or your baby. You hear voices. Contact your doctor if:    You have problems with your medicines. You do not get better as expected. Follow-up care is a key part of your treatment and safety. Be sure to make and go to all appointments, and call your doctor if you are having problems. It's also a good idea to know your test results and keep a list of the medicines you take. Where can you learn more? Go to https://arnoldo.Liquid Environmental Solutions. org and sign in to your Edkimo account. Enter G617 in the "Snippit Media[employer] Inc." box to learn more about \"Depression After Childbirth: Care Instructions. \"     If you do not have an account, please click on the \"Sign Up Now\" link. Current as of: February 9, 2022               Content Version: 13.4  © 2006-2022 Sumavisos. Care instructions adapted under license by [facility] ([facility]). If you have questions about a medical condition or this instruction, always ask your healthcare professional. [address] any warranty or liability for your use of this information. Patient Education        Stress in Parents of Infants: Care Instructions  Your Care Instructions     Meeting the increased demands of being a new parent can be a big challenge. It is easy to get overtired and overwhelmed during the first weeks. What used to be a simple chore, such as buying groceries, is not so simple now. Plus, you have new chores, including feeding and changing your new baby. At the end of the day, you may be so tired that you feel like crying. Instead of looking forward to the next day, you may be dreading tomorrow. Like many new parents, you are burned out from the stress of having a new baby. Stress affects each of us differently, and the most effective ways to relieve it are different for each person. You can try different methods to find out which ones work best for you. As the weeks go by, you will begin to develop a rhythm with your baby. Tasks that now seem to take forever will become easier. Many women get the \"baby blues\" during the first few days after childbirth. If you are a new mother and the \"baby blues\" last more than a few days, call your doctor right away. Depression is a medical condition that requires treatment. Follow-up care is a key part of your treatment and safety. Be sure to make and go to all appointments, and call your doctor if you are having problems. It's also a good idea to know your test results and keep a list of the medicines you take. How can you care for yourself at home? Be kind to yourself.  Your new baby takes a lot of work, but he or she can give you a lot of pleasure too. Do not worry about housekeeping for a while. Allow your friends to bring you meals or do chores. Limit visitors to as few as you feel you can handle, or ask them not to visit for a while. Before they come, set a limit on how long they will stay. Sleep when your baby sleeps. Even a short nap helps. Find what triggers your stress, and avoid those things as much as you can. If you breastfeed, learn how to collect and store some breast milk so your partner or  can feed the baby while you sleep. Experts usually recommend waiting about a month until breastfeeding is going well before offering a bottle. Eat a balanced diet so you can keep up your energy. Drink plenty of fluids throughout the day. Avoid caffeine and alcohol. Caffeine is found in coffee, tea, cola drinks, chocolate, and other foods. Limit medicines that can make you more tired, such as tranquilizers and cold and allergy medicines. Get regular daily exercise, such as walks, to help improve your mood. Rest after you exercise. Be honest with yourself and those who care about you. Tell them you are stressed and tired. Talking to other new parents can help. Ask your doctor or child's doctor to suggest support groups for new parents. Hearing that someone else is having the same experiences you are can help a lot. If you have the baby blues for more than a few days, call your doctor right away. When should you call for help? Call 911 anytime you think you may need emergency care. For example, call if:    You have thoughts of hurting yourself, your baby, or another person. Call your doctor now or seek immediate medical care if:    You are having trouble caring for yourself or your baby. Watch closely for changes in your health, and be sure to contact your doctor if you have any problems. Where can you learn more? Go to https://chqiana.Catabasis Pharmaceuticals. org and sign in to your [email or web address] account.  Enter H142 in the [address] Information box to learn more about \"Stress in Parents of Infants: Care Instructions. \"     If you do not have an account, please click on the \"Sign Up Now\" link. Current as of: February 23, 2022               Content Version: 13.4  © 4267-0855 Healthwise, Incorporated. Care instructions adapted under license by [facility] ([facility]). If you have questions about a medical condition or this instruction, always ask your healthcare professional. [address] any warranty or liability for your use of this information.

## 2022-10-20 NOTE — PROGRESS NOTES
The patient returns for her post-partum visit. All information below was reviewed with her. Visit Reason:  Post-Partum Visit       [de-identified] [facility]: [facility]       Delivery Date: 9/27/2022       Type of Delivery: vaginal       Feeding: breastfeeding and formula       LMP: unknown       Contraceptive Choices: no sex since the delivery; wants to get on [facility]- desires the Nexplanon       Last PAP: \"before I got pregnant\"       Depression: \"no, I mean maybe, it's just overwhelmness. I do have my depression and anxiety medicine, but haven't taken it since I've had him. I really honestly forgot about it. \"- scored 14    Problems: No, still bleeding, but has noticed an odor. Had odor before pregnancy but is the odor normal or will it go away? She washes and sprays off with a shower head to rinse all of it off. Feels like the blood is starting to somewhat taper off since it's more brown.

## 2022-11-14 ENCOUNTER — HOSPITAL ENCOUNTER (OUTPATIENT)
Dept: LACTATION | Facility: HOSPITAL | Age: 22
Discharge: HOME | End: 2022-11-14

## 2022-11-14 NOTE — LACTATION NOTE
MEGAN DELIVERY    Mother: Dinorah Ronda  Infant:  Hui (m)  Age  6 weeks  Today's Wt 12-6.8 5635 g    Feeding Hx:  Mother had desired exc bfing. She had harvested colostrum prenatally. Upon first attempt at bfing while still inpt, was told infant was not latching. Colostrum was given. Pt was told infant required more; and that formula was needed. Staff gave 2 oz formula per mother. Pt began pumping collecting max of 3 oz per session at her peak. Milk then lowered. She now collects 1 oz per session. She is now pumping 20 mins every 3 hours in an attempt to regain supply. She has power pumped. She has researched what to do to retrain older infant to bf. She began these efforts approx one week ago. She achieved 5-10 min sustained latch - on her own- a few days ago. Dinorah has been using a nipple shield to assist at-breast feeds. She obtained inserts to achieve size 21 mm flanges for Medela Max Flow and MomCozy pumps, but feels this is still too large. Also has an Ameda pump through [email or web address].  Typical routine is to give most of bottle then attempt latching at breast. Parents hold infant skin to skin.    Intervention:  Demonstrated proper latching techniques with size 16mm nipple shield. Also used EBM dripped onto sealed lips at breast to entice sucking by infant. Hui sucked 1-2 times, then turned head to root off of nipple. He never relaxed and maintained latch, transferring milk. However, with only 1-2 suckles, shield FULL of milk each time infant came off breast. Taught cross cradle and football holds. Infant strong and large, resisting tight cuddling in these positions. Hui preferred a semi-upright position similar to bottle-feeding while at breast. Ameda manual pump given. Lanolin given. Taught paced bottle feeding so that infant would not become fussy (as much) at breast, as he will mimic bfing when at bottle. Mother fed infant well this way EBM, 3 oz.   Med cups and 1 ml syringes given. BFing A Great Start  "book, Bfing Tip Cards given. Breast exam confirms approximate size 18mm pump flange to be most appropriate fit.     Plan  Continue practicing techniques discussed / demo'ed to entice infant to breast.   Use paced bottle feeding when bottle is given.   Cont to give 3 oz EBM per bottle (paced method) , then offer breast.   Bf skin to skin with 16 mm nipple shield.  Use syringe and EBM dripped onto latch to help Ames keep drinking.   Refer to handouts: \"Coaxing to Breast\" , \"My Baby Won'T Nurse\", \"When Baby Refuses the Breast\" , \"Paced Bottle Feeding\" for getting Ames to accept bfing.  Offer breast when he is not overly hungry or tired. Offer while bathing together.  Carry him in wrap skin to skin often as this help bfing behaviors at feeding time.   Be patient.   When you feel he is actually drinking milk at breast, maintaining latch for 10-15 mins and seeming satisfied when he comes off, call for another OP lactation appmt.   Watch video:  Attaching Your Baby to the Breast on YouTube  Join [employer] on fb .  OP Lactation:  [phone]    *This note and [employer] handout mailed to pt's home after consult.    11/14/22  ETELVINA Davila   "

## 2022-11-16 ENCOUNTER — POSTPARTUM VISIT (OUTPATIENT)
Dept: OBGYN CLINIC | Age: 22
End: 2022-11-16
Payer: MEDICAID

## 2022-11-16 VITALS
DIASTOLIC BLOOD PRESSURE: 61 MMHG | SYSTOLIC BLOOD PRESSURE: 136 MMHG | HEART RATE: 94 BPM | HEIGHT: 64 IN | WEIGHT: 146 LBS | BODY MASS INDEX: 24.92 KG/M2

## 2022-11-16 DIAGNOSIS — Z30.41 ENCOUNTER FOR SURVEILLANCE OF CONTRACEPTIVE PILLS: ICD-10-CM

## 2022-11-16 PROCEDURE — 99213 OFFICE O/P EST LOW 20 MIN: CPT | Performed by: ADVANCED PRACTICE MIDWIFE

## 2022-11-16 PROCEDURE — G8419 CALC BMI OUT NRM PARAM NOF/U: HCPCS | Performed by: ADVANCED PRACTICE MIDWIFE

## 2022-11-16 PROCEDURE — G8484 FLU IMMUNIZE NO ADMIN: HCPCS | Performed by: ADVANCED PRACTICE MIDWIFE

## 2022-11-16 PROCEDURE — G8428 CUR MEDS NOT DOCUMENT: HCPCS | Performed by: ADVANCED PRACTICE MIDWIFE

## 2022-11-16 PROCEDURE — 1036F TOBACCO NON-USER: CPT | Performed by: ADVANCED PRACTICE MIDWIFE

## 2022-11-16 RX ORDER — DROSPIRENONE 4 MG/1
1 TABLET, FILM COATED ORAL DAILY
Qty: 84 TABLET | Refills: 2 | Status: SHIPPED | OUTPATIENT
Start: 2022-11-16

## 2022-11-16 ASSESSMENT — ENCOUNTER SYMPTOMS
RESPIRATORY NEGATIVE: 1
EYES NEGATIVE: 1
ALLERGIC/IMMUNOLOGIC NEGATIVE: 1
GASTROINTESTINAL NEGATIVE: 1

## 2022-11-16 NOTE — PROGRESS NOTES
Patient presents today for her 6 week PP. Visit Reason:  Post-Partum Visit       [de-identified] Name: Artist Olivia [facility]       Delivery Date: 9/27/2022       Type of Delivery: vaginal       Feeding: breastfeeding and formula       LMP: unknown       Contraceptive Choices: Nexplanon today. She has had sex sicne having the baby and did not use anything for protection- and is now scared she may have an infection. Having pelvic pain and odor in urine.        Last PAP: \"before I got pregnant- Jan 2022\"       Depression: \"no, more anxiety\"     Problems: no

## 2022-11-16 NOTE — PATIENT INSTRUCTIONS
Patient Education        After Pregnancy: Exercises  Introduction  Here are some examples of exercises that can help after pregnancy. Start each exercise slowly. Ease off the exercise if you start to have pain. Your doctor or physical therapist will tell you when you can start these exercises and which ones will work best for you. How to do the exercises  Tummy tuck  Lie on your back, and place two fingers just inside your hip bones so you can feel your lower belly muscles. Take a deep breath in. As you breathe out, pull your belly button in toward your spine, as if you are trying to zip up a tight pair of jeans. You should feel your lower belly muscles pull slightly away from your fingers as the muscles tighten. Hold for about 6 seconds, but do not hold your breath. Repeat 8 to 12 times. Repeat several times a day, and try to hold your lower belly muscles in for longer as you get stronger. Practice doing this exercise while you are standing, such as when you are standing in line, or sitting. Heel slides  Lie on the floor with your knees bent, and place two fingers just inside your hip bones so you can feel your lower belly muscles. Your feet should be flat on the floor. Pull your belly button in toward your spine. You should feel your lower belly muscles pull slightly away from your fingers as the muscles tighten. Keep holding your belly button in as you slowly slide one foot along the floor until your leg is out straight. Slowly slide the leg back to your starting position while making sure that you keep holding your belly button in. Do not arch or move your back as you are doing this. Do not hold your breath. Relax and repeat with your other leg. Repeat 8 to 12 times. Knee-to-chest stretch  Lie on your back with one knee bent and the other leg straight. Clasp your hands together under your bent knee and bring the knee to your chest. Keep your lower back pressed to the floor.   If it hurts your back to keep your opposite leg straight as you stretch, bend that knee too, and keep that foot flat on the floor. Hold for at least 15 to 30 seconds. Relax and lower the knee to the starting position. Repeat with the other leg. Repeat 2 to 4 times with each leg. Neck rotation  Sit in a firm chair, or stand up straight. Keeping your chin level, turn your head to the right, and hold for 15 to 30 seconds. Turn your head to the left and hold for 15 to 30 seconds. Repeat 2 to 4 times to each side. Shoulder rolls  Sit comfortably with your feet shoulder-width apart. You can also do this exercise while standing. Roll your shoulders up, then back, and then down in a smooth, circular motion. Repeat 2 to 4 times. Midback stretch  If you have knee pain, do not do this exercise. Kneel on the floor, and sit back on your ankles. Lean forward, place your hands on the floor, and stretch your arms out in front of you. Rest your head between your arms. Gently push your chest toward the floor, reaching as far in front of you as possible. Hold for 15 to 30 seconds. Repeat 2 to 4 times. Back stretches  Get down on your hands and knees on the floor. Relax your head and allow it to droop. Round your back up toward the ceiling until you feel a nice stretch in your upper, middle, and lower back. Hold this stretch for as long as it feels comfortable, or about 15 to 30 seconds. Return to the starting position with a flat back while you are on your hands and knees. Let your back sway by pressing your stomach toward the floor. Lift your buttocks toward the ceiling. Hold this position for 15 to 30 seconds. Repeat 2 to 4 times. Hamstring stretch (lying down)  Lie flat on your back with your legs straight. If you feel discomfort in your back, place a small towel roll under your lower back. Holding the back of your leg, lift your leg straight up and toward your body until you feel a stretch at the back of your thigh.   Hold the stretch for at least 30 seconds. Repeat 2 to 4 times. Switch legs and repeat steps 1 through 4. Calf stretch  Stand facing a wall with your hands on the wall at about eye level. Put your leg about a step behind your other leg. Keeping your back leg straight and your back heel on the floor, bend your front knee and gently bring your hip and chest toward the wall until you feel a stretch in the calf of your back leg. Hold the stretch for 15 to 30 seconds. Repeat 2 to 4 times. Repeat steps 1 through 4, but this time keep your back knee bent. Switch legs and repeat steps 1 through 5. Follow-up care is a key part of your treatment and safety. Be sure to make and go to all appointments, and call your doctor if you are having problems. It's also a good idea to know your test results and keep a list of the medicines you take. Current as of: February 23, 2022               Content Version: 13.4  © 2006-2022 [employer]. Care instructions adapted under license by [facility] ([facility]). If you have questions about a medical condition or this instruction, always ask your healthcare professional. [place] [address] any warranty or liability for your use of this information. Patient Education        Kegel Exercises: Care Instructions  Overview     Kegel exercises strengthen muscles around the bladder. These muscles control the flow of urine. Kegel exercises are sometime called \"pelvic floor\" exercises. They can help prevent urine leakage and keep the pelvic organs in place. Kegel exercises can strengthen pelvic muscles that have been weakened by age, pregnancy, childbirth, and surgery. They may help prevent or treat urine leakage. You do Kegel exercises by squeezing your pelvic floor muscles. You will likely need to do these exercises for several weeks to get better. Follow-up care is a key part of your treatment and safety.  Be sure to make and go to all appointments, and call your doctor if you are having problems. It's also a good idea to know your test results and keep a list of the medicines you take. How can you care for yourself at home? To do Kegel exercises:  Squeeze your muscles as if you were trying not to pass gas. Or squeeze your muscles as if you were stopping the flow of urine. Your belly, legs, and buttocks shouldn't move. Hold the squeeze for 3 seconds, then relax for 5 to 10 seconds. Start with 3 seconds, then add 1 second each week until you are able to squeeze for 10 seconds. Repeat the exercise 10 times a session. Do 3 to 8 sessions a day. When learning what muscles to squeeze, you can try stopping the flow of urine a few times. But don't make it a practice to do Kegels while urinating. If doing these exercises causes pain, stop doing them and talk with your doctor. Sometimes people have pelvic floor muscles that are too tight. In these cases, doing Kegel exercises may cause more problems. Check with your doctor if you don't notice a difference after trying these exercises for several weeks. Your doctor may suggest getting help from a physical therapist or recommend other treatment. Where can you learn more? Go to https://DJZ.Light Chaser Animation. org and sign in to your Back& account. Enter I228 in the Bonfire.com box to learn more about \"Kegel Exercises: Care Instructions. \"     If you do not have an account, please click on the \"Sign Up Now\" link. Current as of: June 16, 2022               Content Version: 13.4  © 2006-2022 [employer]. Care instructions adapted under license by [facility] ([facility]). If you have questions about a medical condition or this instruction, always ask your healthcare professional. [place] [address] any warranty or liability for your use of this information.        Patient Education        Depression After Childbirth: Care Instructions  It's common to lose sleep, feel irritable, and cry easily during the first few days after childbirth. Hormone changes and the demands of a new baby can cause these \"baby blues. \" If these mood changes last more than 2 weeks, you may have postpartum depression. This is a medical condition that requires treatment. If you have any of these signs, you may be depressed. See your doctor right away. You feel very sad or hopeless and lose interest in daily activities. You sleep too much or not enough. You feel tired or as if you have no energy. You eat too much or too little. You write or talk about death. Where to get help 24 hours a day, 7 days a week   If you or someone you know talks about suicide, self-harm, a mental health crisis, a substance use crisis, or any other kind of emotional distress, get help right away. You can:   Call the Suicide and Crisis Lifeline at 65. Call 4-126-188-FAXB (). 3-165.492.5516    Text HOME to 130285 to access the Crisis Text Line. Consider saving these numbers in your phone. What you can do   Try to go to all of your counseling sessions. Take medicines as directed. Eat healthy foods. Get daily exercise, such as walks. Try to get some sunlight every day. Avoid using alcohol or other substances. Get as much rest as possible. Connect with friends, and join a support group for new parents. When should you call for help? Call 339 if: You feel you cannot stop from hurting yourself, your baby, or someone else. Call your doctor now or seek immediate medical care if:    You are having trouble caring for yourself or your baby. You hear voices. Contact your doctor if:    You have problems with your medicines. You do not get better as expected. Follow-up care is a key part of your treatment and safety. Be sure to make and go to all appointments, and call your doctor if you are having problems.  It's also a good idea to know your test results and keep a list of the medicines you take.  Where can you learn more? Go to https://chpepiceweb.GameAccount [email or web address] org and sign in to your Touch [employer] Technologies account. Enter B570 in the 500[employer]ies box to learn more about \"Depression After Childbirth: Care Instructions. \"     If you do not have an account, please click on the \"Sign Up Now\" link. Current as of: February 9, 2022               Content Version: 13.4  © 2006-2022 [email or web address].ms. Care instructions adapted under license by [facility] [address] If you have questions about a medical condition or this instruction, always ask your healthcare professional. [place] [address] any warranty or liability for your use of this information. Patient Education        Stress in Parents of Infants: Care Instructions  Your Care Instructions     Meeting the increased demands of being a new parent can be a big challenge. It is easy to get overtired and overwhelmed during the first weeks. What used to be a simple chore, such as buying groceries, is not so simple now. Plus, you have new chores, including feeding and changing your new baby. At the end of the day, you may be so tired that you feel like crying. Instead of looking forward to the next day, you may be dreading tomorrow. Like many new parents, you are burned out from the stress of having a new baby. Stress affects each of us differently, and the most effective ways to relieve it are different for each person. You can try different methods to find out which ones work best for you. As the weeks go by, you will begin to develop a rhythm with your baby. Tasks that now seem to take forever will become easier. Many women get the \"baby blues\" during the first few days after childbirth. If you are a new mother and the \"baby blues\" last more than a few days, call your doctor right away. Depression is a medical condition that requires treatment. Follow-up care is a key part of your treatment and safety.  Be sure to make and go to all appointments, and call your doctor if you are having problems. It's also a good idea to know your test results and keep a list of the medicines you take. How can you care for yourself at home? Be kind to yourself. Your new baby takes a lot of work, but he or she can give you a lot of pleasure too. Do not worry about housekeeping for a while. Allow your friends to bring you meals or do chores. Limit visitors to as few as you feel you can handle, or ask them not to visit for a while. Before they come, set a limit on how long they will stay. Sleep when your baby sleeps. Even a short nap helps. Find what triggers your stress, and avoid those things as much as you can. If you breastfeed, learn how to collect and store some breast milk so your partner or  can feed the baby while you sleep. Experts usually recommend waiting about a month until breastfeeding is going well before offering a bottle. Eat a balanced diet so you can keep up your energy. Drink plenty of fluids throughout the day. Avoid caffeine and alcohol. Caffeine is found in coffee, tea, cola drinks, chocolate, and other foods. Limit medicines that can make you more tired, such as tranquilizers and cold and allergy medicines. Get regular daily exercise, such as walks, to help improve your mood. Rest after you exercise. Be honest with yourself and those who care about you. Tell them you are stressed and tired. Talking to other new parents can help. Ask your doctor or child's doctor to suggest support groups for new parents. Hearing that someone else is having the same experiences you are can help a lot. If you have the baby blues for more than a few days, call your doctor right away. When should you call for help? Call 911 anytime you think you may need emergency care. For example, call if:    You have thoughts of hurting yourself, your baby, or another person.    Call your doctor now or seek immediate medical care if:    You are having trouble caring for yourself or your baby. Watch closely for changes in your health, and be sure to contact your doctor if you have any problems. Where can you learn more? Go to https://American HealthNetqiana.healthMetafor [email or web address] org and sign in to your MyTrainer account. Enter H142 in the App [employer]. box to learn more about \"Stress in Parents of Infants: Care Instructions. \"     If you do not have an account, please click on the \"Sign Up Now\" link. Current as of: February 23, 2022               Content Version: 13.4  © 2354-7450 [employer]. Care instructions adapted under license by [facility] ([facility]). If you have questions about a medical condition or this instruction, always ask your healthcare professional. [place] [address] any warranty or liability for your use of this information. Patient Education        Learning About Birth Control After Childbirth  What is birth control? Birth control (contraception) is any method used to prevent pregnancy. Wait until you're healed before you have sexual intercourse. This takes about 4 to 6 weeks. If you have sex without birth control, there is a chance that you could get pregnant. This is true even if you haven't started having periods again. Even if you breastfeed, you can still get pregnant. Most experts suggest waiting at least 18 months to get pregnant again. This can reduce risks for you and the baby. There may be reasons for you to get pregnant sooner, though. So talk with your doctor about the risks and benefits. The only sure way to not get pregnant is to not have sex. But finding a good method of birth control that you're comfortable with can help you avoid an unplanned pregnancy. Your doctor can help you choose the birth control method that's right for you. What are the types of birth control? Long-acting reversible contraception (LARC). This is the most effective reversible method you can use to prevent pregnancy. LARCs are implants and intrauterine devices (IUDs). While they are being used, they usually prevent pregnancy for years. If you decide you want to get pregnant, you can have them removed. Implants are placed under the skin of the arm. This can be done right after you give birth. They release the hormone progestin. They prevent pregnancy for about 3 years. IUDs are placed in the uterus by a doctor. This can be done right after you give birth, if you and your doctor discuss it beforehand. Or it can be done at a doctor visit later. There are two main types of IUDs--the copper IUD and the hormonal IUD. The hormonal IUD releases progestin. IUDs prevent pregnancy for 3 to 10 years, depending on the type. Hormonal methods. They are very good at preventing pregnancy. Combination birth control pills (\"the pill\"), skin patches, and vaginal rings release the hormones estrogen and progestin. Depo-Provera is a shot you get every 3 months. Shots, mini-pills, IUDs, and implants release progestin only. It's best to use progestin-only options in the first few weeks after you give birth. Barrier methods. These don't prevent pregnancy as well as implants, IUDs, or hormonal methods do. Barrier methods include condoms, diaphragms, and cervical caps. You must use them every time you have sex. If you had a diaphragm or cervical cap before you got pregnant, talk to your doctor to see if you need a different size. Condoms can be used anytime after you give birth. Natural family planning. This is also known as fertility awareness or the rhythm method. It can work if you and your partner are careful and you have a regular ovulation cycle. But it doesn't work better than other birth control methods. You will need to keep records so you know when you are most likely to become pregnant. And during those times, you will need to use a barrier method or not have sex. Permanent birth control (sterilization).  It gives you lasting protection against pregnancy. A man can have a vasectomy. A woman can have her tubes tied (tubal ligation). But this is only a good choice if you are sure that you don't want any more children. Emergency contraception. This is a backup method to prevent pregnancy if you didn't use birth control or if a condom breaks. The most effective emergency contraception is an IUD (inserted by a doctor). You can also get emergency contraceptive pills. You can get them with a prescription from your doctor or without a prescription at most drugstores. How can you get birth control? You can buy:  Condoms and spermicides without a prescription. You can get them in drugstores, online, and in many grocery stores. Some forms of emergency contraception without a prescription. You can get these at most drugstores. You need to see a doctor or visit family planning clinic to:  Get a prescription for birth control pills and other methods that use hormones. Have an implant or IUD inserted. This includes the type of IUD used for emergency contraception. Get a hormone shot. Get a prescription for a diaphragm or cervical cap. Get a prescription for certain kinds of emergency contraception. Follow-up care is a key part of your treatment and safety. Be sure to make and go to all appointments, and call your doctor if you are having problems. It's also a good idea to know your test results and keep a list of the medicines you take. Where can you learn more? Go to https://arnoldo.healthNovawise. org and sign in to your Flux Power account. Enter A966 in the [facility] box to learn more about \"Learning About Birth Control After Childbirth. \"     If you do not have an account, please click on the \"Sign Up Now\" link. Current as of: November 22, 2021               Content Version: 13.4  © 1714-1643 Healthwise, Incorporated. Care instructions adapted under license by [facility] ([facility]).  If you have questions about a medical condition or this instruction, always ask your healthcare professional. [address] any warranty or liability for your use of this information. Patient Education        Implant for Birth Control: Care Instructions  Your Care Instructions     The implant is used to prevent pregnancy. It's a thin krystal about the size of a matchstick that is inserted under the skin (subdermal) on the inside of your arm. The implant prevents pregnancy for 3 years. After it is put in, you don't have to do anything else to prevent pregnancy. Follow-up care is a key part of your treatment and safety. Be sure to make and go to all appointments, and call your doctor if you are having problems. It's also a good idea to know your test results and keep a list of the medicines you take. How can you care for yourself at home? How do you use the subdermal implant? The implant is put in by your doctor or another trained health professional. It only takes a few minutes. This can also be done right after you give birth. Your doctor will remove the implant when it needs to be taken out. An adhesive bandage and a tight (pressure) bandage will be placed over the site. This helps reduce swelling and bruising. Ask your doctor if you need to use backup birth control, such as a condom, for a week after insertion. Whether you need to do this depends on where you are in your cycle. How can you care for the insertion site? Remove the pressure bandage after 24 hours. Keep the area dry. Keep an adhesive bandage on the site for 3 to 5 days after the procedure. If you have pain, use an ice pack or take an over-the-counter pain medicine. Some soreness or bruising is normal.  What else do you need to know? It's safe to use while breastfeeding. The implant has side effects. You may have changes in your period. Your period may stop. You may also have spotting or bleeding between periods.   You may have mood changes, less interest in sex, or weight gain. The implant can stay in place for up to 3 years to prevent pregnancy. But your doctor may talk to you about leaving it in for longer. If you don't replace the implant and don't use another form of birth control, you could get pregnant. If you have the implant removed, you'll have to find another method of birth control. If you don't, you may get pregnant. Even if you are planning to get pregnant, you have to have the implant removed. Check with your doctor before you use any other medicines. This includes over-the-counter medicines, vitamins, herbal products, and supplements. Birth control hormones may not work as well to prevent pregnancy when combined with other medicines. The implant doesn't protect against sexually transmitted infections (STIs), such as herpes or HIV/AIDS. If you're not sure if your sex partner might have an STI, use a condom to protect against infection. When should you call for help? Call 911  anytime you think you may need emergency care. For example, call if:    You have shortness of breath. You have chest pain. You passed out (lost consciousness). You cough up blood. Call your doctor now or seek immediate medical care if:    You have severe pain or numbness and tingling in the arm where the implant was inserted. You have increased pain, swelling, warmth, or redness at the insertion site. You have signs of a blood clot in your leg (called a deep vein thrombosis), such as:  Pain in your calf, back of the knee, thigh, or groin. Redness and swelling in your leg. Watch closely for changes in your health, and be sure to contact your doctor if:    You can't feel your implant. You think your implant might be bent or broken in your arm. You think you might be pregnant. You have any problems with your birth control method. Where can you learn more? Go to [email or web address]://arnoldo.health-[email or web address] org and sign in to your Document Agility account.  Enter L248 in the [facility] box to learn more about \"Implant for Birth Control: Care Instructions. \"     If you do not have an account, please click on the \"Sign Up Now\" link. Current as of: November 22, 2021               Content Version: 13.4  © 3637-8224 Healthwise, Incorporated. Care instructions adapted under license by [facility] ([facility]). If you have questions about a medical condition or this instruction, always ask your healthcare professional. [address] any warranty or liability for your use of this information.

## 2022-11-16 NOTE — PROGRESS NOTES
[facility] JESSICA RODRIGUEZ OB/GYN  CNM Office Note    Carolyn Richards is a 25 y.o. female who presents today for her medical conditions/ complaints as noted below. Chief Complaint   Patient presents with    Postpartum Care     6 week PP; patient delivered vaginally on 9/27/2022. Contraception     Nexplanon insertion. HPI  Patient presents today for her 6 week PP. She is unsure if she wants nexplanon and would like to consider birth control instead. Visit Reason:  Post-Partum Visit       [de-identified] Name: Rito Shrestha [facility]– [place]       Delivery Date: 9/27/2022       Type of Delivery: vaginal       Feeding: breastfeeding and formula       LMP: unknown       Contraceptive Choices: Nexplanon today. She has had sex sicne having the baby and did not use anything for protection- and is now scared she may have an infection. Having pelvic pain and odor in urine. Last PAP: \"before I got pregnant- Jan 2022\"       Depression: \"no, more anxiety\"  Patient Active Problem List   Diagnosis   (none) - all problems resolved or deleted       No LMP recorded (lmp unknown). (Menstrual status: Postpartum). [id number]    Past Medical History:   Diagnosis Date    Anxiety      Past Surgical History:   Procedure Laterality Date    ADENOIDECTOMY      TONSILLECTOMY      TYMPANOSTOMY TUBE PLACEMENT       No family history on file.   Social History     Tobacco Use    Smoking status: Never     Passive exposure: Yes    Smokeless tobacco: Never   Substance Use Topics    Alcohol use: No       Current Outpatient Medications   Medication Sig Dispense Refill    Drospirenone (SLYND) 4 MG TABS Take 1 tablet by mouth daily 84 tablet 2    phenylephrine-mineral oil-petrolatum (PREPARATION H) 0.25-14-74.9 % rectal ointment Place rectally every 2 hours as needed for Hemorrhoids (Irritation, For perineal pain or discomfort, Apply to perineal area) (Patient not taking: Reported on 10/20/2022) 1 each 0    ibuprofen (ADVIL;MOTRIN) 800 MG tablet Take 1 tablet by mouth every 8 hours as needed for Pain (Patient not taking: Reported on 10/20/2022) 30 tablet 0    ferrous sulfate (IRON 325) 325 (65 Fe) MG tablet Take 1 tablet by mouth 2 times daily (with meals) (Patient not taking: Reported on 10/20/2022) 30 tablet 3    docusate sodium (COLACE, DULCOLAX) 100 MG CAPS Take 100 mg by mouth 2 times daily (Patient not taking: Reported on 10/20/2022) 30 capsule 0    Prenatal Vit-Fe Fumarate-FA (PRENATAL 1+1 PO) Take by mouth       No current facility-administered medications for this visit. No Known Allergies  Vitals:    11/16/22 1153   BP: 136/61   Site: Left Upper Arm   Position: Sitting   Cuff Size: Medium Adult   Pulse: 94   Weight: 146 lb (66.2 kg)   Height: 5' 4\" (1.626 m)     Body mass index is 25.06 kg/m². Review of Systems   Constitutional: Negative. HENT: Negative. Eyes: Negative. Respiratory: Negative. Cardiovascular: Negative. Gastrointestinal: Negative. Endocrine: Negative. Genitourinary: Negative. Musculoskeletal: Negative. Skin: Negative. Allergic/Immunologic: Negative. Neurological: Negative. Hematological: Negative. Psychiatric/Behavioral: Negative. Physical Exam  Constitutional:       Appearance: Normal appearance. She is well-developed. She is not diaphoretic. HENT:      Head: Normocephalic and atraumatic. Nose: Nose normal.   Eyes:      Extraocular Movements: Extraocular movements intact. Conjunctiva/sclera: Conjunctivae normal.      Pupils: Pupils are equal, round, and reactive to light. Neck:      Thyroid: No thyromegaly. Trachea: No tracheal deviation. Pulmonary:      Effort: Pulmonary effort is normal. No respiratory distress. Musculoskeletal:         General: Normal range of motion. Cervical back: Normal range of motion and neck supple. Comments: Normal ROM for upper and lower extremities. Gait steady. Skin:     General: Skin is warm and dry.       Findings: No lesion or rash. Neurological:      Mental Status: She is alert and oriented to person, place, and time. Psychiatric:         Mood and Affect: Mood normal.         Speech: Speech normal.         Behavior: Behavior normal.        Diagnosis Orders   1. 6 weeks postpartum follow-up  POCT urine pregnancy      2. Postpartum care following vaginal delivery        3. Postpartum care and examination        4. Insertion of implantable subdermal contraceptive  POCT urine pregnancy    [place] INSERTION DRUG DELIVERY IMPLANT          MEDICATIONS:  Orders Placed This Encounter   Medications    Drospirenone (SLYND) 4 MG TABS     Sig: Take 1 tablet by mouth daily     Dispense:  84 tablet     Refill:  2       ORDERS:  Orders Placed This Encounter   Procedures    POCT urine pregnancy    [place] INSERTION DRUG DELIVERY IMPLANT       PLAN:  Contraceptive mgmt - begin slynd now. Back up method x 2 weeks.

## 2022-11-29 ENCOUNTER — TELEPHONE (OUTPATIENT)
Dept: ADMINISTRATIVE | Age: 22
End: 2022-11-29

## 2022-11-29 DIAGNOSIS — R39.9 UTI SYMPTOMS: Primary | ICD-10-CM

## 2022-11-29 NOTE — TELEPHONE ENCOUNTER
Pt. Is concerned with possible infection. States she's been having discharge that is clear and stringy for the past 5 weeks. Also has itch and abdominal pain.

## 2022-11-30 NOTE — TELEPHONE ENCOUNTER
Returned call to pt. Pt states she has vaginal discharge with an odor and abdominal pain. She also states she has burning with urination. I told her to come in to do a swab and to leave a urine at the lab, she states she will do this tomorrow.

## 2022-12-09 NOTE — PATIENT INSTRUCTIONS
Patient Education        Breast Self-Exam: Care Instructions  Your Care Instructions     A breast self-exam is when you check your breasts for lumps or changes. This regular exam helps you learn how your breasts normally look and feel. Most breast problems or changes are not because of cancer. Breast self-exam is not a substitute for a mammogram. Having regular breast exams by your doctor and regular mammograms improve your chances of finding any problems with your breasts. Some women set a time each month to do a step-by-step breast self-exam. Other women like a less formal system. They might look at their breasts as they brush their teeth, or feel their breasts once in a while in the shower. If you notice a change in your breast, tell your doctor. Follow-up care is a key part of your treatment and safety. Be sure to make and go to all appointments, and call your doctor if you are having problems. It's also a good idea to know your test results and keep a list of the medicines you take. How do you do a breast self-exam?  The best time to examine your breasts is usually one week after your menstrual period begins. Your breasts should not be tender then. If you do not have periods, you might do your exam on a day of the month that is easy to remember. To examine your breasts:  Remove all your clothes above the waist and lie down. When you are lying down, your breast tissue spreads evenly over your chest wall, which makes it easier to feel all your breast tissue. Use the pads--not the fingertips--of the 3 middle fingers of your left hand to check your right breast. Move your fingers slowly in small coin-sized circles that overlap. Use three levels of pressure to feel of all your breast tissue. Use light pressure to feel the tissue close to the skin surface. Use medium pressure to feel a little deeper. Use firm pressure to feel your tissue close to your breastbone and ribs.  Use each pressure level to feel your breast tissue before moving on to the next spot. Check your entire breast, moving up and down as if following a strip from the collarbone to the bra line, and from the armpit to the ribs. Repeat until you have covered the entire breast.  Repeat this procedure for your left breast, using the pads of the 3 middle fingers of your right hand. To examine your breasts while in the shower:  Place one arm over your head and lightly soap your breast on that side. Using the pads of your fingers, gently move your hand over your breast (in the strip pattern described above), feeling carefully for any lumps or changes. Repeat for the other breast.  Have your doctor inspect anything you notice to see if you need further testing. Where can you learn more? Go to http://www.woods.com/ and enter P148 to learn more about \"Breast Self-Exam: Care Instructions. \"  Current as of: August 2, 2022               Content Version: 13.5  © 2006-2022 [employer]. Care instructions adapted under license by [facility] ([facility]). If you have questions about a medical condition or this instruction, always ask your healthcare professional. [place] [address] any warranty or liability for your use of this information. Patient Education        A Healthy Lifestyle: Care Instructions  A healthy lifestyle can help you feel good, have more energy, and stay at a weight that's healthy for you. You can share a healthy lifestyle with your friends and family. And you can do it on your own. Eat meals with your friends or family. You could try cooking together. Plan activities with other people. Go for a walk with a friend, try a free online fitness class, or join a sports league. Eat a variety of healthy foods. These include fruits, vegetables, whole grains, low-fat dairy, and lean protein. Choose healthy portions of food. You can use the Nutrition Facts label on food packages as a guide.      Eat more fruits and vegetables. You could add vegetables to sandwiches or add fruit to cereal.   Drink water when you are thirsty. Limit soda, juice, and sports drinks. Try to exercise most days. Aim for at least 2½ hours of exercise each week. Keep moving. Work in the garden or take your dog on a walk. Use the stairs instead of the elevator. If you use tobacco or nicotine, try to quit. Ask your doctor about programs and medicines to help you quit. Limit alcohol. Men should have no more than 2 drinks a day. Women should have no more than 1. For some people, no alcohol is the best choice. Follow-up care is a key part of your treatment and safety. Be sure to make and go to all appointments, and call your doctor if you are having problems. It's also a good idea to know your test results and keep a list of the medicines you take. Where can you learn more? Go to http://www.goodman.com/ and enter U807 to learn more about \"A Healthy Lifestyle: Care Instructions. \"  Current as of: March 9, 2022               Content Version: 13.5  © 4296-6963 [employer]. Care instructions adapted under license by [facility] ([facility]). If you have questions about a medical condition or this instruction, always ask your healthcare professional. [address] any warranty or liability for your use of this information. Patient Education        Body Mass Index: Care Instructions  Your Care Instructions     Body mass index (BMI) can help you see if your weight is raising your risk for health problems. It uses a formula to compare how much you weigh with how tall you are. A BMI lower than 18.5 is considered underweight. A BMI between 18.5 and 24.9 is considered healthy. A BMI between 25 and 29.9 is considered overweight. A BMI of 30 or higher is considered obese. If your BMI is in the normal range, it means that you have a lower risk for weight-related health problems.  If your BMI is in the overweight or obese range, you may be at increased risk for weight-related health problems, such as high blood pressure, heart disease, stroke, arthritis or joint pain, and diabetes. If your BMI is in the underweight range, you may be at increased risk for health problems such as fatigue, lower protection (immunity) against illness, muscle loss, bone loss, hair loss, and hormone problems. BMI is just one measure of your risk for weight-related health problems. You may be at higher risk for health problems if you are not active, you eat an unhealthy diet, or you drink too much alcohol or use tobacco products. Follow-up care is a key part of your treatment and safety. Be sure to make and go to all appointments, and call your doctor if you are having problems. It's also a good idea to know your test results and keep a list of the medicines you take. How can you care for yourself at home? Practice healthy eating habits. This includes eating plenty of fruits, vegetables, whole grains, lean protein, and low-fat dairy. If your doctor recommends it, get more exercise. Walking is a good choice. Bit by bit, increase the amount you walk every day. Try for at least 30 minutes on most days of the week. Do not smoke. Smoking can increase your risk for health problems. If you need help quitting, talk to your doctor about stop-smoking programs and medicines. These can increase your chances of quitting for good. Limit alcohol to 2 drinks a day for men and 1 drink a day for women. Too much alcohol can cause health problems. If you have a BMI higher than 25  Your doctor may do other tests to check your risk for weight-related health problems. This may include measuring the distance around your waist. A waist measurement of more than 40 inches in men or 35 inches in women can increase the risk of weight-related health problems. Talk with your doctor about steps you can take to stay healthy or improve your health.  You may need to make lifestyle changes to lose weight and stay healthy, such as changing your diet and getting regular exercise. If you have a BMI lower than 18.5  Your doctor may do other tests to check your risk for health problems. Talk with your doctor about steps you can take to stay healthy or improve your health. You may need to make lifestyle changes to gain or maintain weight and stay healthy, such as getting more healthy foods in your diet and doing exercises to build muscle. Where can you learn more? Go to http://www.woods.com/ and enter S176 to learn more about \"Body Mass Index: Care Instructions. \"  Current as of: August 25, 2022               Content Version: 13.5  © 2006-2022 [employer]. Care instructions adapted under license by [facility] ([facility]). If you have questions about a medical condition or this instruction, always ask your healthcare professional. [address] any warranty or liability for your use of this information.

## 2022-12-12 ENCOUNTER — OFFICE VISIT (OUTPATIENT)
Dept: OBGYN CLINIC | Age: 22
End: 2022-12-12
Payer: MEDICAID

## 2022-12-12 VITALS
SYSTOLIC BLOOD PRESSURE: 110 MMHG | DIASTOLIC BLOOD PRESSURE: 80 MMHG | WEIGHT: 146 LBS | BODY MASS INDEX: 24.92 KG/M2 | HEIGHT: 64 IN

## 2022-12-12 DIAGNOSIS — N89.8 VAGINAL DISCHARGE: Primary | ICD-10-CM

## 2022-12-12 DIAGNOSIS — R10.9 ABDOMINAL PAIN, UNSPECIFIED ABDOMINAL LOCATION: ICD-10-CM

## 2022-12-12 PROCEDURE — 1036F TOBACCO NON-USER: CPT | Performed by: ADVANCED PRACTICE MIDWIFE

## 2022-12-12 PROCEDURE — G8419 CALC BMI OUT NRM PARAM NOF/U: HCPCS | Performed by: ADVANCED PRACTICE MIDWIFE

## 2022-12-12 PROCEDURE — G8427 DOCREV CUR MEDS BY ELIG CLIN: HCPCS | Performed by: ADVANCED PRACTICE MIDWIFE

## 2022-12-12 PROCEDURE — G8484 FLU IMMUNIZE NO ADMIN: HCPCS | Performed by: ADVANCED PRACTICE MIDWIFE

## 2022-12-12 PROCEDURE — 99213 OFFICE O/P EST LOW 20 MIN: CPT | Performed by: ADVANCED PRACTICE MIDWIFE

## 2022-12-12 ASSESSMENT — ENCOUNTER SYMPTOMS
GASTROINTESTINAL NEGATIVE: 1
RESPIRATORY NEGATIVE: 1
ALLERGIC/IMMUNOLOGIC NEGATIVE: 1
EYES NEGATIVE: 1

## 2022-12-12 NOTE — PROGRESS NOTES
[facility] JESSICA RODRIGUEZ OB/GYN  CNM Office Note    Tiffanie Rojas is a 25 y.o. female who presents today for her medical conditions/ complaints as noted below. Chief Complaint   Patient presents with    Follow-up     Itching and Discharge-feels like it has now stopped. Vaginal Delivery 9/27/2022         [facility]  [facility] presents for evaluation of vaginal itching and discharge. Concerned she could have yeast infection. C/o irritation and \"not right\" since after vaginal delivery. Symptoms have come and gone over the past 6-8 weeks. Patient Active Problem List   Diagnosis   (none) - all problems resolved or deleted       Patient's last menstrual period was 12/05/2022 (approximate). [id number]    Past Medical History:   Diagnosis Date    Anxiety      Past Surgical History:   Procedure Laterality Date    ADENOIDECTOMY      TONSILLECTOMY      TYMPANOSTOMY TUBE PLACEMENT       No family history on file.   Social History     Tobacco Use    Smoking status: Never     Passive exposure: Yes    Smokeless tobacco: Never   Substance Use Topics    Alcohol use: No       Current Outpatient Medications   Medication Sig Dispense Refill    Prenatal Vit-Fe Fumarate-FA (PRENATAL 1+1 PO) Take by mouth      Drospirenone (SLYND) 4 MG TABS Take 1 tablet by mouth daily (Patient not taking: Reported on 12/12/2022) 84 tablet 2    phenylephrine-mineral oil-petrolatum (PREPARATION H) 0.25-14-74.9 % rectal ointment Place rectally every 2 hours as needed for Hemorrhoids (Irritation, For perineal pain or discomfort, Apply to perineal area) (Patient not taking: No sig reported) 1 each 0    ibuprofen (ADVIL;MOTRIN) 800 MG tablet Take 1 tablet by mouth every 8 hours as needed for Pain (Patient not taking: No sig reported) 30 tablet 0    ferrous sulfate (IRON 325) 325 (65 Fe) MG tablet Take 1 tablet by mouth 2 times daily (with meals) (Patient not taking: No sig reported) 30 tablet 3    docusate sodium (COLACE, DULCOLAX) 100 MG CAPS Take 100 mg by mouth 2 times daily (Patient not taking: No sig reported) 30 capsule 0     No current facility-administered medications for this visit. No Known Allergies  Vitals:    12/12/22 1359   BP: 110/80   Site: Left Upper Arm   Position: Sitting   Cuff Size: Medium Adult   Weight: 146 lb (66.2 kg)   Height: 5' 4\" (1.626 m)     Body mass index is 25.06 kg/m². Review of Systems   Constitutional: Negative. HENT: Negative. Eyes: Negative. Respiratory: Negative. Cardiovascular: Negative. Gastrointestinal: Negative. Endocrine: Negative. Genitourinary:  Positive for vaginal discharge. Musculoskeletal: Negative. Skin: Negative. Allergic/Immunologic: Negative. Neurological: Negative. Hematological: Negative. Psychiatric/Behavioral: Negative. Physical Exam  Constitutional:       Appearance: Normal appearance. She is well-developed. She is not diaphoretic. HENT:      Head: Normocephalic and atraumatic. Nose: Nose normal.   Eyes:      Extraocular Movements: Extraocular movements intact. Conjunctiva/sclera: Conjunctivae normal.      Pupils: Pupils are equal, round, and reactive to light. Neck:      Thyroid: No thyromegaly. Trachea: No tracheal deviation. Pulmonary:      Effort: Pulmonary effort is normal. No respiratory distress. Musculoskeletal:         General: Normal range of motion. Cervical back: Normal range of motion and neck supple. Comments: Normal ROM for upper and lower extremities. Gait steady. Skin:     General: Skin is warm and dry. Findings: No lesion or rash. Neurological:      Mental Status: She is alert and oriented to person, place, and time. Psychiatric:         Mood and Affect: Mood normal.         Speech: Speech normal.         Behavior: Behavior normal.        Diagnosis Orders   1. Vaginal discharge        2.  Abdominal pain, unspecified abdominal location            MEDICATIONS:  No orders of the defined types were placed in this encounter. ORDERS:  No orders of the defined types were placed in this encounter.       PLAN:  Vaginal discharge - diatherix collected

## 2022-12-19 ENCOUNTER — TELEPHONE (OUTPATIENT)
Dept: OBGYN CLINIC | Age: 22
End: 2022-12-19

## 2022-12-19 RX ORDER — DOXYCYCLINE HYCLATE 100 MG
100 TABLET ORAL 2 TIMES DAILY
Qty: 14 TABLET | Refills: 0 | Status: SHIPPED | OUTPATIENT
Start: 2022-12-19 | End: 2022-12-26

## 2022-12-19 RX ORDER — METRONIDAZOLE 500 MG/1
500 TABLET ORAL 2 TIMES DAILY
Qty: 14 TABLET | Refills: 0 | Status: SHIPPED | OUTPATIENT
Start: 2022-12-19 | End: 2022-12-26

## 2023-01-05 ENCOUNTER — TELEPHONE (OUTPATIENT)
Dept: OBGYN CLINIC | Age: 23
End: 2023-01-05

## 2023-01-05 RX ORDER — FLUCONAZOLE 150 MG/1
150 TABLET ORAL
Qty: 2 TABLET | Refills: 0 | Status: SHIPPED | OUTPATIENT
Start: 2023-01-05 | End: 2023-01-08

## 2023-01-05 NOTE — TELEPHONE ENCOUNTER
Pt left voicemail stating that she's been having vaginal itching and pain after taking a antibiotic. Pt is requesting for something to be sent the pharmacy.

## 2023-01-11 ENCOUNTER — TELEPHONE (OUTPATIENT)
Dept: OBGYN CLINIC | Age: 23
End: 2023-01-11

## 2023-01-11 NOTE — TELEPHONE ENCOUNTER
Pt called states she had BV and was given medicine and then was given medicine for diflucan. Spoke with [place] she states pt can come in and get a swab. Pt informed and she states she is swollen and red and let know probably form the itching she said the itching was so bad and was hurting.  Caty/PAU

## 2023-02-08 ENCOUNTER — TELEMEDICINE (OUTPATIENT)
Dept: OBGYN CLINIC | Age: 23
End: 2023-02-08

## 2023-02-08 ENCOUNTER — TELEPHONE (OUTPATIENT)
Dept: OBGYN CLINIC | Age: 23
End: 2023-02-08

## 2023-02-08 DIAGNOSIS — R45.851 SUICIDAL THOUGHTS: ICD-10-CM

## 2023-02-08 NOTE — PATIENT INSTRUCTIONS
Patient Education        Depression Treatment: Care Instructions  Your Care Instructions     Depression is a condition that affects the way you feel, think, and act. It causes symptoms such as low energy, loss of interest in daily activities, and sadness or grouchiness that goes on for a long time. Depression is very common and affects men and women of all ages. Depression is a medical illness caused by changes in the natural chemicals in your brain. It is not a character flaw, and it does not mean that you are a bad or weak person. It does not mean that you are going crazy. It is important to know that depression can be treated. Medicines, counseling, and self-care can all help. Many people do not get help because they are embarrassed or think that they will get over the depression on their own. But some people do not get better without treatment. Follow-up care is a key part of your treatment and safety. Be sure to make and go to all appointments, and call your doctor if you are having problems. It's also a good idea to know your test results and keep a list of the medicines you take. How can you care for yourself at home? Learn about antidepressant medicines  Antidepressant medicines can improve or end the symptoms of depression. You may need to take the medicine for at least 6 months, and often longer. Keep taking your medicine even if you feel better. If you stop taking it too soon, your symptoms may come back or get worse. You may start to feel better within 1 to 3 weeks of taking antidepressant medicine. But it can take as many as 6 to 8 weeks to see more improvement. Talk to your doctor if you have problems with your medicine or if you do not notice any improvement after 3 weeks. Antidepressants can make you feel tired, dizzy, or nervous. Some people have dry mouth, constipation, headaches, sexual problems, an upset stomach, or diarrhea.  Many of these side effects are mild and go away on their own after you take the medicine for a few weeks. Some may last longer. Talk to your doctor if side effects bother you too much. You might be able to try a different medicine. If you are pregnant or breastfeeding, talk to your doctor about what medicines you can take. Learn about counseling  In many cases, counseling can work as well as medicines to treat mild to moderate depression. Counseling is done by licensed mental health providers, such as psychologists, social workers, and some types of nurses. It can be done in one-on-one sessions or in a group setting. Many people find group sessions helpful. Cognitive behavioral therapy is a type of counseling. In this treatment, you learn how to see and change unhelpful thinking styles that may be adding to your depression. Counseling and medicines often work well when used together. When should you call for help? Call 911 anytime you think you may need emergency care. For example, call if:    You feel you cannot stop from hurting yourself or someone else. Where to get help 24 hours a day, 7 days a week   If you or someone you know talks about suicide, self-harm, a mental health crisis, a substance use crisis, or any other kind of emotional distress, get help right away. You can:    Call the Suicide and Crisis Lifeline at 65. Call 1-800-273-talk (0-502.231.7573). Text HOME to 013098 to access the Crisis Text Line. Consider saving these numbers in your phone. Call your doctor now or seek immediate medical care if:    You hear voices. You feel much more depressed. Watch closely for changes in your health, and be sure to contact your doctor if:    You are having problems with your depression medicine. You are not getting better as expected. Where can you learn more? Go to http://www.woods.com/ and enter G693 to learn more about \"Depression Treatment: Care Instructions. \"  Current as of: February 9, 2022               Content Version: 13.5  © 2006-2022 Healthwise, [employer]. Care instructions adapted under license by [facility] ([facility]). If you have questions about a medical condition or this instruction, always ask your healthcare professional. [address] any warranty or liability for your use of this information. Patient Education        Depression After Childbirth: Care Instructions  It's common to lose sleep, feel irritable, and cry easily during the first few days after childbirth. Hormone changes and the demands of a new baby can cause these \"baby blues. \" If these mood changes last more than 2 weeks, you may have postpartum depression. This is a medical condition that requires treatment. If you have any of these signs, you may be depressed. See your doctor right away. You feel very sad or hopeless and lose interest in daily activities. You sleep too much or not enough. You feel tired or as if you have no energy. You eat too much or too little. You write or talk about death. Where to get help 24 hours a day, 7 days a week   If you or someone you know talks about suicide, self-harm, a mental health crisis, a substance use crisis, or any other kind of emotional distress, get help right away. You can:   Call the Suicide and Crisis Lifeline at 65. Call 2-511-804-CTNZ (). 9-381.403.9237    Text HOME to 802708 to access the Crisis Text Line. Consider saving these numbers in your phone. What you can do   Try to go to all of your counseling sessions. Take medicines as directed. Eat healthy foods. Get daily exercise, such as walks. Try to get some sunlight every day. Avoid using alcohol or other substances. Get as much rest as possible. Connect with friends, and join a support group for new parents. When should you call for help? Call 911 if: You feel you cannot stop from hurting yourself, your baby, or someone else.    Call your doctor now or seek immediate medical care if:    You are having trouble caring for yourself or your baby. You hear voices. Contact your doctor if:    You have problems with your medicines. You do not get better as expected. Follow-up care is a key part of your treatment and safety. Be sure to make and go to all appointments, and call your doctor if you are having problems. It's also a good idea to know your test results and keep a list of the medicines you take. Where can you learn more? Go to http://www.woods.com/ and enter J743 to learn more about \"Depression After Childbirth: Care Instructions. \"  Current as of: February 9, 2022               Content Version: 13.5  © 2006-2022 [employer]. Care instructions adapted under license by [facility] ([facility]). If you have questions about a medical condition or this instruction, always ask your healthcare professional. [place] [address] any warranty or liability for your use of this information. Patient Education        Stress in Parents of Infants: Care Instructions  Your Care Instructions     Meeting the increased demands of being a new parent can be a big challenge. It is easy to get overtired and overwhelmed during the first weeks. What used to be a simple chore, such as buying groceries, is not so simple now. Plus, you have new chores, including feeding and changing your new baby. At the end of the day, you may be so tired that you feel like crying. Instead of looking forward to the next day, you may be dreading tomorrow. Like many new parents, you are burned out from the stress of having a new baby. Stress affects each of us differently, and the most effective ways to relieve it are different for each person. You can try different methods to find out which ones work best for you. As the weeks go by, you will begin to develop a rhythm with your baby. Tasks that now seem to take forever will become easier.   Many women get the \"baby blues\" during the first few days after childbirth. If you are a new mother and the \"baby blues\" last more than a few days, call your doctor right away. Depression is a medical condition that requires treatment. Follow-up care is a key part of your treatment and safety. Be sure to make and go to all appointments, and call your doctor if you are having problems. It's also a good idea to know your test results and keep a list of the medicines you take. How can you care for yourself at home? Be kind to yourself. Your new baby takes a lot of work, but he or she can give you a lot of pleasure too. Do not worry about housekeeping for a while. Allow your friends to bring you meals or do chores. Limit visitors to as few as you feel you can handle, or ask them not to visit for a while. Before they come, set a limit on how long they will stay. Sleep when your baby sleeps. Even a short nap helps. Find what triggers your stress, and avoid those things as much as you can. If you breastfeed, learn how to collect and store some breast milk so your partner or  can feed the baby while you sleep. Experts usually recommend waiting about a month until breastfeeding is going well before offering a bottle. Eat a balanced diet so you can keep up your energy. Drink plenty of fluids throughout the day. Avoid caffeine and alcohol. Caffeine is found in coffee, tea, cola drinks, chocolate, and other foods. Limit medicines that can make you more tired, such as tranquilizers and cold and allergy medicines. Get regular daily exercise, such as walks, to help improve your mood. Rest after you exercise. Be honest with yourself and those who care about you. Tell them you are stressed and tired. Talking to other new parents can help. Ask your doctor or child's doctor to suggest support groups for new parents. Hearing that someone else is having the same experiences you are can help a lot.   If you have the baby blues for more than a few days, call your doctor right away. When should you call for help? Call 911 anytime you think you may need emergency care. For example, call if:    You have thoughts of hurting yourself, your baby, or another person. Call your doctor now or seek immediate medical care if:    You are having trouble caring for yourself or your baby. Watch closely for changes in your health, and be sure to contact your doctor if you have any problems. Where can you learn more? Go to http://www.woods.com/ and enter H142 to learn more about \"Stress in Parents of Infants: Care Instructions. \"  Current as of: February 23, 2022               Content Version: 13.5  © 8437-0424 [employer]. Care instructions adapted under license by [facility] ([facility]). If you have questions about a medical condition or this instruction, always ask your healthcare professional. [place] [address] any warranty or liability for your use of this information. Patient Education        Learning About Antidepressants  What are antidepressants? Antidepressants are medicines that change the levels of some chemicals in the brain. They can help affect moods. There are different types. They work on brain chemicals in different ways. These medicines can help treat depression. They are also used for anxiety, eating disorders, post-traumatic stress disorder, and chronic pain. What are some examples? There are many types of antidepressants. They include:  Selective serotonin reuptake inhibitors (SSRIs). Some examples are:  Citalopram.  Fluoxetine. Sertraline. Serotonin and norepinephrine reuptake inhibitors (SNRIs). Some examples are:  Duloxetine. Venlafaxine. Atypical antidepressants. These include:  Bupropion. Mirtazapine. Trazodone. Tricyclic and tetracyclic antidepressants. These include:  Amitriptyline. Nortriptyline. Monoamine oxidase inhibitors (MAOIs). An example is selegiline.   What are the side effects? Side effects may vary. They depend on the medicine you take. But common ones include:  Nausea. Dry mouth. Loss of appetite. Diarrhea or constipation. Sexual problems. (These may include loss of desire or erection problems.)  Headaches. Trouble falling asleep. Or you may wake up a lot at night. Weight gain. Feeling nervous or on edge. Feeling drowsy in the daytime. Problems with sexual arousal and a lack of interest in sex are common side effects. If this happens to you, talk to your doctor. There are other medicines that may help with these problems. Mild side effects may go away after you take the medicine for a few weeks. If the side effects bother you, talk with your doctor. You may need a different medicine. Or the doctor may suggest ways to manage your side effects. How do you take antidepressants safely? If your doctor has prescribed antidepressants, try these tips. They can help you get the most from your medicine. Share your health history with your doctor. Tell your doctor about your other medicines and health conditions. These things can affect which antidepressant your doctor prescribes for you. Tell your doctor if you or anyone in your family has had bipolar disorder or used antidepressants before. Take your medicine as prescribed. It works best and has fewer side effects when you take it exactly as your doctor prescribed. If you miss a dose and it's not too late in the day, you can still take it. Don't double up doses. Keep taking your medicine for a while. Take it for at least 6 months after you feel better. This can help keep you from getting symptoms again. Be prepared to try different medicines and doses. You may start to feel better within 1 to 3 weeks after you start the medicine. If you have not improved at all in 3 weeks, your doctor may increase your dose. Or you may need to try a different medicine.  Over time, your doctor may need to adjust your dose again to manage your symptoms better. Don't take any new medicines unless you talk to your doctor first.   Even common medicines like aspirin and some vitamins and herbs can cause problems if you use them while you take antidepressants. Do not drink alcohol. It can make the side effects worse. Don't stop taking your medicine on your own. Quitting antidepressants too quickly can cause withdrawal symptoms. It can also cause depression to come back. If you are having a problem with your medicine or are ready to quit taking it, work with your doctor. They can help you to slowly reduce the dose over a span of a few weeks. Call your doctor right away for serious side effects. Watch for:  Warning signs of suicide. These include talking or writing about death, giving away belongings, and withdrawing from family and friends. Manic behavior. This includes having very high energy, sleeping less than normal, being impulsive, or being grouchy or restless. Serotonin syndrome. This can happen if you take too much antidepressant medicine or take more than one type of medicine that affects serotonin. Signs may include high body temperature, clumsiness, nausea, and feeling restless. Follow-up care is a key part of your treatment and safety. Be sure to make and go to all appointments, and call your doctor if you are having problems. It's also a good idea to know your test results and keep a list of the medicines you take. Where can you learn more? Go to http://www.woods.com/ and enter A230 to learn more about \"Learning About Antidepressants. \"  Current as of: February 9, 2022               Content Version: 13.5  © 3750-0517 Healthwise, [employer]. Care instructions adapted under license by [facility] ([facility]). If you have questions about a medical condition or this instruction, always ask your healthcare professional. [place] [address] any warranty or liability for your use of this information.

## 2023-02-08 NOTE — TELEPHONE ENCOUNTER
Patient called to schedule an appointment with Kayli Dunbar for a vaginal infection and to discuss her anxiety meds. Please return patient's call. Thank you.
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
## 2023-02-09 NOTE — PATIENT INSTRUCTIONS
Patient Education        Depression After Childbirth: Care Instructions  It's common to lose sleep, feel irritable, and cry easily during the first few days after childbirth. Hormone changes and the demands of a new baby can cause these \"baby blues. \" If these mood changes last more than 2 weeks, you may have postpartum depression. This is a medical condition that requires treatment. If you have any of these signs, you may be depressed. See your doctor right away. You feel very sad or hopeless and lose interest in daily activities. You sleep too much or not enough. You feel tired or as if you have no energy. You eat too much or too little. You write or talk about death. Where to get help 24 hours a day, 7 days a week   If you or someone you know talks about suicide, self-harm, a mental health crisis, a substance use crisis, or any other kind of emotional distress, get help right away. You can:   Call the Suicide and Crisis Lifeline at 65. Call 1-901-095-TALK (). 4-194.895.1501    Text HOME to 352939 to access the Crisis Text Line. Consider saving these numbers in your phone. What you can do   Try to go to all of your counseling sessions. Take medicines as directed. Eat healthy foods. Get daily exercise, such as walks. Try to get some sunlight every day. Avoid using alcohol or other substances. Get as much rest as possible. Connect with friends, and join a support group for new parents. When should you call for help? Call 911 if: You feel you cannot stop from hurting yourself, your baby, or someone else. Call your doctor now or seek immediate medical care if:    You are having trouble caring for yourself or your baby. You hear voices. Contact your doctor if:    You have problems with your medicines. You do not get better as expected. Follow-up care is a key part of your treatment and safety.  Be sure to make and go to all appointments, and call your doctor if you are having problems. It's also a good idea to know your test results and keep a list of the medicines you take. Where can you learn more? Go to http://www.woods.com/ and enter I827 to learn more about \"Depression After Childbirth: Care Instructions. \"  Current as of: February 9, 2022               Content Version: 13.5  © 2006-2022 [employer]. Care instructions adapted under license by [facility] ([facility]). If you have questions about a medical condition or this instruction, always ask your healthcare professional. [place] [address] any warranty or liability for your use of this information. Patient Education        Depression Treatment: Care Instructions  Your Care Instructions     Depression is a condition that affects the way you feel, think, and act. It causes symptoms such as low energy, loss of interest in daily activities, and sadness or grouchiness that goes on for a long time. Depression is very common and affects men and women of all ages. Depression is a medical illness caused by changes in the natural chemicals in your brain. It is not a character flaw, and it does not mean that you are a bad or weak person. It does not mean that you are going crazy. It is important to know that depression can be treated. Medicines, counseling, and self-care can all help. Many people do not get help because they are embarrassed or think that they will get over the depression on their own. But some people do not get better without treatment. Follow-up care is a key part of your treatment and safety. Be sure to make and go to all appointments, and call your doctor if you are having problems. It's also a good idea to know your test results and keep a list of the medicines you take. How can you care for yourself at home? Learn about antidepressant medicines  Antidepressant medicines can improve or end the symptoms of depression.  You may need to take the medicine for at least 6 months, and often longer. Keep taking your medicine even if you feel better. If you stop taking it too soon, your symptoms may come back or get worse. You may start to feel better within 1 to 3 weeks of taking antidepressant medicine. But it can take as many as 6 to 8 weeks to see more improvement. Talk to your doctor if you have problems with your medicine or if you do not notice any improvement after 3 weeks. Antidepressants can make you feel tired, dizzy, or nervous. Some people have dry mouth, constipation, headaches, sexual problems, an upset stomach, or diarrhea. Many of these side effects are mild and go away on their own after you take the medicine for a few weeks. Some may last longer. Talk to your doctor if side effects bother you too much. You might be able to try a different medicine. If you are pregnant or breastfeeding, talk to your doctor about what medicines you can take. Learn about counseling  In many cases, counseling can work as well as medicines to treat mild to moderate depression. Counseling is done by licensed mental health providers, such as psychologists, social workers, and some types of nurses. It can be done in one-on-one sessions or in a group setting. Many people find group sessions helpful. Cognitive behavioral therapy is a type of counseling. In this treatment, you learn how to see and change unhelpful thinking styles that may be adding to your depression. Counseling and medicines often work well when used together. When should you call for help? Call 911 anytime you think you may need emergency care. For example, call if:    You feel you cannot stop from hurting yourself or someone else. Where to get help 24 hours a day, 7 days a week   If you or someone you know talks about suicide, self-harm, a mental health crisis, a substance use crisis, or any other kind of emotional distress, get help right away.  You can:    Call the Suicide and Crisis Lifeline at 988. Call 2-551-331-TALK (1-647.598.1063). Text HOME to 378394 to access the Crisis Text Line. Consider saving these numbers in your phone. Call your doctor now or seek immediate medical care if:    You hear voices. You feel much more depressed. Watch closely for changes in your health, and be sure to contact your doctor if:    You are having problems with your depression medicine. You are not getting better as expected. Where can you learn more? Go to http://www.woods.com/ and enter G693 to learn more about \"Depression Treatment: Care Instructions. \"  Current as of: February 9, 2022               Content Version: 13.5  © 2006-2022 [employer]. Care instructions adapted under license by [facility] ([facility]). If you have questions about a medical condition or this instruction, always ask your healthcare professional. [place] [address] any warranty or liability for your use of this information. Patient Education        Learning About Antidepressants  What are antidepressants? Antidepressants are medicines that change the levels of some chemicals in the brain. They can help affect moods. There are different types. They work on brain chemicals in different ways. These medicines can help treat depression. They are also used for anxiety, eating disorders, post-traumatic stress disorder, and chronic pain. What are some examples? There are many types of antidepressants. They include:  Selective serotonin reuptake inhibitors (SSRIs). Some examples are:  Citalopram.  Fluoxetine. Sertraline. Serotonin and norepinephrine reuptake inhibitors (SNRIs). Some examples are:  Duloxetine. Venlafaxine. Atypical antidepressants. These include:  Bupropion. Mirtazapine. Trazodone. Tricyclic and tetracyclic antidepressants. These include:  Amitriptyline. Nortriptyline. Monoamine oxidase inhibitors (MAOIs). An example is selegiline.   What are the side effects? Side effects may vary. They depend on the medicine you take. But common ones include:  Nausea. Dry mouth. Loss of appetite. Diarrhea or constipation. Sexual problems. (These may include loss of desire or erection problems.)  Headaches. Trouble falling asleep. Or you may wake up a lot at night. Weight gain. Feeling nervous or on edge. Feeling drowsy in the daytime. Problems with sexual arousal and a lack of interest in sex are common side effects. If this happens to you, talk to your doctor. There are other medicines that may help with these problems. Mild side effects may go away after you take the medicine for a few weeks. If the side effects bother you, talk with your doctor. You may need a different medicine. Or the doctor may suggest ways to manage your side effects. How do you take antidepressants safely? If your doctor has prescribed antidepressants, try these tips. They can help you get the most from your medicine. Share your health history with your doctor. Tell your doctor about your other medicines and health conditions. These things can affect which antidepressant your doctor prescribes for you. Tell your doctor if you or anyone in your family has had bipolar disorder or used antidepressants before. Take your medicine as prescribed. It works best and has fewer side effects when you take it exactly as your doctor prescribed. If you miss a dose and it's not too late in the day, you can still take it. Don't double up doses. Keep taking your medicine for a while. Take it for at least 6 months after you feel better. This can help keep you from getting symptoms again. Be prepared to try different medicines and doses. You may start to feel better within 1 to 3 weeks after you start the medicine. If you have not improved at all in 3 weeks, your doctor may increase your dose. Or you may need to try a different medicine.  Over time, your doctor may need to adjust your dose again to manage your symptoms better. Don't take any new medicines unless you talk to your doctor first.   Even common medicines like aspirin and some vitamins and herbs can cause problems if you use them while you take antidepressants. Do not drink alcohol. It can make the side effects worse. Don't stop taking your medicine on your own. Quitting antidepressants too quickly can cause withdrawal symptoms. It can also cause depression to come back. If you are having a problem with your medicine or are ready to quit taking it, work with your doctor. They can help you to slowly reduce the dose over a span of a few weeks. Call your doctor right away for serious side effects. Watch for:  Warning signs of suicide. These include talking or writing about death, giving away belongings, and withdrawing from family and friends. Manic behavior. This includes having very high energy, sleeping less than normal, being impulsive, or being grouchy or restless. Serotonin syndrome. This can happen if you take too much antidepressant medicine or take more than one type of medicine that affects serotonin. Signs may include high body temperature, clumsiness, nausea, and feeling restless. Follow-up care is a key part of your treatment and safety. Be sure to make and go to all appointments, and call your doctor if you are having problems. It's also a good idea to know your test results and keep a list of the medicines you take. Where can you learn more? Go to http://www.woods.com/ and enter A230 to learn more about \"Learning About Antidepressants. \"  Current as of: February 9, 2022               Content Version: 13.5  © 8764-4563 [employer]wise[employer]. Care instructions adapted under license by [facility] ([facility]). If you have questions about a medical condition or this instruction, always ask your healthcare professional. [place] [address] any warranty or liability for your use of this information. Patient Education        Stress in Parents of Infants: Care Instructions  Your Care Instructions     Meeting the increased demands of being a new parent can be a big challenge. It is easy to get overtired and overwhelmed during the first weeks. What used to be a simple chore, such as buying groceries, is not so simple now. Plus, you have new chores, including feeding and changing your new baby. At the end of the day, you may be so tired that you feel like crying. Instead of looking forward to the next day, you may be dreading tomorrow. Like many new parents, you are burned out from the stress of having a new baby. Stress affects each of us differently, and the most effective ways to relieve it are different for each person. You can try different methods to find out which ones work best for you. As the weeks go by, you will begin to develop a rhythm with your baby. Tasks that now seem to take forever will become easier. Many women get the \"baby blues\" during the first few days after childbirth. If you are a new mother and the \"baby blues\" last more than a few days, call your doctor right away. Depression is a medical condition that requires treatment. Follow-up care is a key part of your treatment and safety. Be sure to make and go to all appointments, and call your doctor if you are having problems. It's also a good idea to know your test results and keep a list of the medicines you take. How can you care for yourself at home? Be kind to yourself. Your new baby takes a lot of work, but he or she can give you a lot of pleasure too. Do not worry about housekeeping for a while. Allow your friends to bring you meals or do chores. Limit visitors to as few as you feel you can handle, or ask them not to visit for a while. Before they come, set a limit on how long they will stay. Sleep when your baby sleeps. Even a short nap helps. Find what triggers your stress, and avoid those things as much as you can.   If you breastfeed, learn how to collect and store some breast milk so your partner or  can feed the baby while you sleep. Experts usually recommend waiting about a month until breastfeeding is going well before offering a bottle. Eat a balanced diet so you can keep up your energy. Drink plenty of fluids throughout the day. Avoid caffeine and alcohol. Caffeine is found in coffee, tea, cola drinks, chocolate, and other foods. Limit medicines that can make you more tired, such as tranquilizers and cold and allergy medicines. Get regular daily exercise, such as walks, to help improve your mood. Rest after you exercise. Be honest with yourself and those who care about you. Tell them you are stressed and tired. Talking to other new parents can help. Ask your doctor or child's doctor to suggest support groups for new parents. Hearing that someone else is having the same experiences you are can help a lot. If you have the baby blues for more than a few days, call your doctor right away. When should you call for help? Call 911 anytime you think you may need emergency care. For example, call if:    You have thoughts of hurting yourself, your baby, or another person. Call your doctor now or seek immediate medical care if:    You are having trouble caring for yourself or your baby. Watch closely for changes in your health, and be sure to contact your doctor if you have any problems. Where can you learn more? Go to http://www.woods.com/ and enter H142 to learn more about \"Stress in Parents of Infants: Care Instructions. \"  Current as of: February 23, 2022               Content Version: 13.5  © 0691-0787 [employer]wise, Incorporated. Care instructions adapted under license by [facility] ([facility]). If you have questions about a medical condition or this instruction, always ask your healthcare professional. [place] [address] any warranty or liability for your use of this information.

## 2023-02-09 NOTE — PROGRESS NOTES
Andreia Tolliver (:  2000) is a Established patient, here for evaluation of the following:    Assessment & Plan   Below is the assessment and plan developed based on review of pertinent history, physical exam, labs, studies, and medications. 1. Postpartum depression  2. Suicidal thoughts    Return in about 1 week (around 2/15/2023), or if symptoms worsen or fail to improve, for office visit. Subjective   HPI  See nurses notes for detailed information. Clare reports increased intrusive suicidal/homicidal thoughts. She denies depression or significant anxiety. Feels the thoughts are sporadic and unpredictable. During visit, denies suicidal or homicidal thoughts currently active and denies any \"plan\". Feels she is safe and family is with her and baby in the house. She responds appropriately, does not appear fearful or disheveled. Concerned she could be starting \"Postpartum psychosis\". Reports she started zoloft but thoughts were more frequent so she discontinued.           Objective   Patient-Reported Vitals  No data recorded     Physical Exam  [INSTRUCTIONS:  \"[x]\" Indicates a positive item  \"[]\" Indicates a negative item  -- DELETE ALL ITEMS NOT EXAMINED]    Constitutional: [x] Appears well-developed and well-nourished [x] No apparent distress      [] Abnormal -     Mental status: [x] Alert and awake  [x] Oriented to person/place/time [x] Able to follow commands    [] Abnormal -     Eyes:   EOM    [x]  Normal    [] Abnormal -   Sclera  [x]  Normal    [] Abnormal -          Discharge [x]  None visible   [] Abnormal -     HENT: [x] Normocephalic, atraumatic  [] Abnormal -   [x] Mouth/Throat: Mucous membranes are moist    External Ears [x] Normal  [] Abnormal -    Neck: [x] No visualized mass [] Abnormal -     Pulmonary/Chest: [x] Respiratory effort normal   [x] No visualized signs of difficulty breathing or respiratory distress        [] Abnormal -      Musculoskeletal:   [x] Normal gait with no signs of ataxia         [x] Normal range of motion of neck        [] Abnormal -     Neurological:        [x] No Facial Asymmetry (Cranial nerve 7 motor function) (limited exam due to video visit)          [x] No gaze palsy        [] Abnormal -          Skin:        [x] No significant exanthematous lesions or discoloration noted on facial skin         [] Abnormal -            Psychiatric:       [x] Normal Affect [] Abnormal -        [x] No Hallucinations    Other pertinent observable physical exam findings:-     PLAN:   I discussed at length with Clare how proud I was of her for reaching out. I also verified via VV that someone was with her in the house, aware of the situation and would be with her and baby throughout the night. She denied any homicidal/suicidal ideations during our visit. I gave her 2 recommendations - admission for IV medication for PP depression or to get her to ER early morning for evaluation. We discussed the need for psychiatric evaluation and possible admission which she was ok with. She was asked to arrive in office tomorrow morning @ 0830 where we would then take her to ER. I confirmed this plan with her SO who was with her, both voiced understanding and agreement. On this date 2/8/2023 I have spent 30 minutes reviewing previous notes, test results and face to face (virtual) with the patient discussing the diagnosis and importance of compliance with the treatment plan as well as documenting on the day of the visit. [facility], was evaluated through a synchronous (real-time) audio-video encounter. The patient (or guardian if applicable) is aware that this is a billable service, which includes applicable co-pays. This Virtual Visit was conducted with patient's (and/or legal guardian's) consent.  The visit was conducted pursuant to the emergency declaration under the [facility] [place], [address]5 waiver authority and the [employer] and [employer] Supplemental Appropriations Act.  Patient identification was verified, and a caregiver was present when appropriate.   The patient was located at Home: [address] [place] [address]  Provider was located at Facility (Appt Dept): [address] [place] [address]  [address]  [place] [address]         --JOHN Dunlap CNM

## 2023-02-13 ENCOUNTER — OFFICE VISIT (OUTPATIENT)
Dept: OBGYN CLINIC | Age: 23
End: 2023-02-13

## 2023-02-13 VITALS
HEIGHT: 64 IN | BODY MASS INDEX: 25.1 KG/M2 | SYSTOLIC BLOOD PRESSURE: 102 MMHG | WEIGHT: 147 LBS | DIASTOLIC BLOOD PRESSURE: 70 MMHG

## 2023-02-13 DIAGNOSIS — R45.851 SUICIDAL THOUGHTS: ICD-10-CM

## 2023-02-13 NOTE — PROGRESS NOTES
[facility]  CNM Office Note    Hanh Oconnor is a 23 y.o. female who presents today for her medical conditions/ complaints as noted below. Chief Complaint   Patient presents with    Depression     Pt thinks she wants to go to therapy. Depression screen score 17    Other     Pt requesting vaginal swab. Feels like things are off. HPI  Diane Covington presents for vaginal swab with \"c/o vaginal irritation and something just isn't right\". Also reports intrusive suicidal/homicidal thoughts but is more concerned that she has them. Feels they are random and doesn't feel she could ever hurt herself or baby. Denies any suicidal/homicidal plan. No depression. Declines medications to treat, would rather do therapy/counseling. Problems/Complaints today:  Depression  Suicidal/homicidal intrusive thoughts (denies any focus on thoughts)  Patient Active Problem List   Diagnosis   (none) - all problems resolved or deleted       Patient's last menstrual period was 02/10/2023. [id number]    Past Medical History:   Diagnosis Date    Anxiety      Past Surgical History:   Procedure Laterality Date    ADENOIDECTOMY      TONSILLECTOMY      TYMPANOSTOMY TUBE PLACEMENT       No family history on file. Social History     Tobacco Use    Smoking status: Never     Passive exposure: Yes    Smokeless tobacco: Never   Substance Use Topics    Alcohol use: No       Current Outpatient Medications   Medication Sig Dispense Refill    ASHWAGANDHA PO Take by mouth       No current facility-administered medications for this visit. No Known Allergies  Vitals:    02/13/23 1007   BP: 102/70   Site: Left Upper Arm   Position: Sitting   Cuff Size: Medium Adult   Weight: 147 lb (66.7 kg)   Height: 5' 4\" (1.626 m)     Body mass index is 25.23 kg/m². A comprehensive review of systems was negative except for what was noted in the HPI. Physical Exam  Constitutional:       Appearance: Normal appearance. She is well-developed.  She is not diaphoretic. HENT:      Head: Normocephalic and atraumatic. Nose: Nose normal.   Eyes:      Extraocular Movements: Extraocular movements intact. Conjunctiva/sclera: Conjunctivae normal.      Pupils: Pupils are equal, round, and reactive to light. Neck:      Thyroid: No thyromegaly. Trachea: No tracheal deviation. Pulmonary:      Effort: Pulmonary effort is normal. No respiratory distress. Musculoskeletal:         General: Normal range of motion. Cervical back: Normal range of motion and neck supple. Comments: Normal ROM for upper and lower extremities. Gait steady. Skin:     General: Skin is warm and dry. Findings: No lesion or rash. Neurological:      Mental Status: She is alert and oriented to person, place, and time. Psychiatric:         Mood and Affect: Mood normal.         Speech: Speech normal.         Behavior: Behavior normal.        Diagnosis Orders   1. Postpartum depression        2. Suicidal thoughts            MEDICATIONS:  No orders of the defined types were placed in this encounter. ORDERS:  No orders of the defined types were placed in this encounter. PLAN:  Vaginitis - diatherix collected  PPD - counselor referral ASAP. We will contact [facility], [facility], and [facility] to see who as first available opening. She was told she could go straight to [address] [place] [address] we have contacted pt to let her know this.

## 2023-02-15 DIAGNOSIS — N89.8 VAGINAL DISCHARGE: Primary | ICD-10-CM

## 2023-02-15 DIAGNOSIS — N89.8 VAGINAL DISCHARGE: ICD-10-CM

## 2023-02-16 ENCOUNTER — TELEPHONE (OUTPATIENT)
Dept: OBGYN CLINIC | Age: 23
End: 2023-02-16

## 2023-02-16 RX ORDER — METRONIDAZOLE 500 MG/1
500 TABLET ORAL 2 TIMES DAILY
Qty: 14 TABLET | Refills: 0 | Status: SHIPPED | OUTPATIENT
Start: 2023-02-16 | End: 2023-02-23

## 2023-03-09 RX ORDER — METRONIDAZOLE 500 MG/1
500 TABLET ORAL 2 TIMES DAILY
Qty: 14 TABLET | Refills: 0 | Status: SHIPPED | OUTPATIENT
Start: 2023-03-09 | End: 2023-03-16

## 2023-05-08 ENCOUNTER — HOSPITAL ENCOUNTER (EMERGENCY)
Age: 23
Discharge: HOME | End: 2023-05-08
Attending: EMERGENCY MEDICINE
Payer: MEDICAID

## 2023-05-08 ENCOUNTER — APPOINTMENT (OUTPATIENT)
Dept: CT IMAGING | Age: 23
End: 2023-05-08
Payer: MEDICAID

## 2023-05-08 VITALS
OXYGEN SATURATION: 97 % | TEMPERATURE: 98.7 F | WEIGHT: 145 LBS | HEART RATE: 112 BPM | SYSTOLIC BLOOD PRESSURE: 108 MMHG | RESPIRATION RATE: 18 BRPM | BODY MASS INDEX: 24.89 KG/M2 | DIASTOLIC BLOOD PRESSURE: 72 MMHG

## 2023-05-08 DIAGNOSIS — R11.2 NAUSEA AND VOMITING, UNSPECIFIED VOMITING TYPE: Primary | ICD-10-CM

## 2023-05-08 DIAGNOSIS — R10.9 ABDOMINAL PAIN, UNSPECIFIED ABDOMINAL LOCATION: ICD-10-CM

## 2023-05-08 LAB
ALBUMIN SERPL-MCNC: 4.4 G/DL (ref 3.5–5.2)
ALP SERPL-CCNC: 113 U/L (ref 35–104)
ALT SERPL-CCNC: 23 U/L (ref 5–33)
ANION GAP SERPL CALCULATED.3IONS-SCNC: 9 MMOL/L (ref 7–19)
AST SERPL-CCNC: 20 U/L (ref 5–32)
BACTERIA URNS QL MICRO: ABNORMAL /HPF
BASOPHILS # BLD: 0 K/UL (ref 0–0.2)
BASOPHILS NFR BLD: 0.2 % (ref 0–1)
BILIRUB SERPL-MCNC: 0.4 MG/DL (ref 0.2–1.2)
BILIRUB UR QL STRIP: NEGATIVE
BUN SERPL-MCNC: 15 MG/DL (ref 6–20)
CALCIUM SERPL-MCNC: 9.1 MG/DL (ref 8.6–10)
CHLORIDE SERPL-SCNC: 103 MMOL/L (ref 98–111)
CLARITY UR: ABNORMAL
CO2 SERPL-SCNC: 25 MMOL/L (ref 22–29)
COLOR UR: YELLOW
CREAT SERPL-MCNC: 0.5 MG/DL (ref 0.5–0.9)
CRYSTALS URNS MICRO: ABNORMAL /HPF
EOSINOPHIL # BLD: 0 K/UL (ref 0–0.6)
EOSINOPHIL NFR BLD: 0.1 % (ref 0–5)
EPI CELLS #/AREA URNS AUTO: 14 /HPF (ref 0–5)
ERYTHROCYTE [DISTWIDTH] IN BLOOD BY AUTOMATED COUNT: 13.4 % (ref 11.5–14.5)
GLUCOSE SERPL-MCNC: 144 MG/DL (ref 74–109)
GLUCOSE UR STRIP.AUTO-MCNC: NEGATIVE MG/DL
HCG SERPL QL: NEGATIVE
HCT VFR BLD AUTO: 44 % (ref 37–47)
HGB BLD-MCNC: 14.5 G/DL (ref 12–16)
HGB UR STRIP.AUTO-MCNC: NEGATIVE MG/L
HYALINE CASTS #/AREA URNS AUTO: 0 /HPF (ref 0–8)
IMM GRANULOCYTES # BLD: 0.2 K/UL
KETONES UR STRIP.AUTO-MCNC: NEGATIVE MG/DL
LEUKOCYTE ESTERASE UR QL STRIP.AUTO: ABNORMAL
LIPASE SERPL-CCNC: 33 U/L (ref 13–60)
LYMPHOCYTES # BLD: 0.3 K/UL (ref 1.1–4.5)
LYMPHOCYTES NFR BLD: 1.8 % (ref 20–40)
MCH RBC QN AUTO: 28.5 PG (ref 27–31)
MCHC RBC AUTO-ENTMCNC: 33 G/DL (ref 33–37)
MCV RBC AUTO: 86.4 FL (ref 81–99)
MONOCYTES # BLD: 0.7 K/UL (ref 0–0.9)
MONOCYTES NFR BLD: 3.9 % (ref 0–10)
NEUTROPHILS # BLD: 16.9 K/UL (ref 1.5–7.5)
NEUTS SEG NFR BLD: 93.2 % (ref 50–65)
NITRITE UR QL STRIP.AUTO: NEGATIVE
PH UR STRIP.AUTO: 8 [PH] (ref 5–8)
PLATELET # BLD AUTO: 277 K/UL (ref 130–400)
PMV BLD AUTO: 11.8 FL (ref 9.4–12.3)
POTASSIUM SERPL-SCNC: 4.5 MMOL/L (ref 3.5–5)
PROT SERPL-MCNC: 7.3 G/DL (ref 6.6–8.7)
PROT UR STRIP.AUTO-MCNC: NEGATIVE MG/DL
RBC # BLD AUTO: 5.09 M/UL (ref 4.2–5.4)
RBC #/AREA URNS AUTO: 2 /HPF (ref 0–4)
SODIUM SERPL-SCNC: 137 MMOL/L (ref 136–145)
SP GR UR STRIP.AUTO: 1.04 (ref 1–1.03)
UROBILINOGEN UR STRIP.AUTO-MCNC: 0.2 E.U./DL
WBC # BLD AUTO: 18.1 K/UL (ref 4.8–10.8)
WBC #/AREA URNS AUTO: 20 /HPF (ref 0–5)

## 2023-05-08 PROCEDURE — 81001 URINALYSIS AUTO W/SCOPE: CPT

## 2023-05-08 PROCEDURE — 36415 COLL VENOUS BLD VENIPUNCTURE: CPT

## 2023-05-08 PROCEDURE — 74177 CT ABD & PELVIS W/CONTRAST: CPT

## 2023-05-08 PROCEDURE — 96361 HYDRATE IV INFUSION ADD-ON: CPT

## 2023-05-08 PROCEDURE — 96374 THER/PROPH/DIAG INJ IV PUSH: CPT

## 2023-05-08 PROCEDURE — 2580000003 HC RX 258: Performed by: EMERGENCY MEDICINE

## 2023-05-08 PROCEDURE — 83690 ASSAY OF LIPASE: CPT

## 2023-05-08 PROCEDURE — 85025 COMPLETE CBC W/AUTO DIFF WBC: CPT

## 2023-05-08 PROCEDURE — 84703 CHORIONIC GONADOTROPIN ASSAY: CPT

## 2023-05-08 PROCEDURE — 99285 EMERGENCY DEPT VISIT HI MDM: CPT

## 2023-05-08 PROCEDURE — 6360000004 HC RX CONTRAST MEDICATION: Performed by: EMERGENCY MEDICINE

## 2023-05-08 PROCEDURE — 80053 COMPREHEN METABOLIC PANEL: CPT

## 2023-05-08 PROCEDURE — 6360000002 HC RX W HCPCS: Performed by: EMERGENCY MEDICINE

## 2023-05-08 PROCEDURE — 87086 URINE CULTURE/COLONY COUNT: CPT

## 2023-05-08 RX ORDER — 0.9 % SODIUM CHLORIDE 0.9 %
1000 INTRAVENOUS SOLUTION INTRAVENOUS ONCE
Status: COMPLETED | OUTPATIENT
Start: 2023-05-08 | End: 2023-05-08

## 2023-05-08 RX ORDER — ONDANSETRON 4 MG/1
4 TABLET, FILM COATED ORAL 3 TIMES DAILY PRN
Qty: 15 TABLET | Refills: 0 | Status: SHIPPED | OUTPATIENT
Start: 2023-05-08

## 2023-05-08 RX ORDER — ONDANSETRON 2 MG/ML
4 INJECTION INTRAMUSCULAR; INTRAVENOUS ONCE
Status: COMPLETED | OUTPATIENT
Start: 2023-05-08 | End: 2023-05-08

## 2023-05-08 RX ADMIN — SODIUM CHLORIDE 1000 ML: 9 INJECTION, SOLUTION INTRAVENOUS at 07:09

## 2023-05-08 RX ADMIN — ONDANSETRON 4 MG: 2 INJECTION INTRAMUSCULAR; INTRAVENOUS at 07:05

## 2023-05-08 RX ADMIN — IOPAMIDOL 70 ML: 755 INJECTION, SOLUTION INTRAVENOUS at 08:11

## 2023-05-08 ASSESSMENT — ENCOUNTER SYMPTOMS
NAUSEA: 1
ABDOMINAL PAIN: 1
BACK PAIN: 0
COUGH: 0
RHINORRHEA: 0
SORE THROAT: 0
VOMITING: 1
SHORTNESS OF BREATH: 0
DIARRHEA: 0

## 2023-05-08 NOTE — ED PROVIDER NOTES
[facility]  eMERGENCY dEPARTMENT eNCOUnter      Pt Name: Debra Sarkar  MRN: [id number]  [place] 2000  Date of evaluation: 5/8/2023  Provider: Susan Martinez MD    [address] [place] [address]       Chief Complaint   Patient presents with    Emesis     Since midnight         HISTORY OF PRESENT ILLNESS   (Location/Symptom, Timing/Onset,Context/Setting, Quality, Duration, Modifying Factors, Severity)  Note limiting factors. Debra Sarkar is a 23 y.o. female who presents to the emergency department for nausea and vomiting. Patient states that she got home around 10 PM last night after grocery shopping and began to feel somewhat warm and have some stomach cramps. She went to bed and woke around midnight and since has had several episodes of nonbloody nonbilious emesis. Admits to some mild lower abdominal pain. Denies any flank pain or UTI symptoms. No diarrhea. States she does deal some constipation but has not been constipated recently however unable to tell me most recent bowel movement but does not report any obvious issues. She is unsure if possibly some leftover pizza could be what caused her to get sick or exact cause. No obvious sick contacts that she is aware of. No prior abdominal surgeries. She is otherwise healthy. HPI    NursingNotes were reviewed. REVIEW OF SYSTEMS    (2-9 systems for level 4, 10 or more for level 5)     Review of Systems   Constitutional:  Positive for fatigue. Negative for chills and fever. HENT:  Negative for rhinorrhea and sore throat. Respiratory:  Negative for cough and shortness of breath. Cardiovascular:  Negative for chest pain. Gastrointestinal:  Positive for abdominal pain, nausea and vomiting. Negative for diarrhea. Genitourinary:  Negative for dysuria, frequency and urgency. Musculoskeletal:  Negative for back pain and neck pain. Neurological:  Negative for dizziness and headaches. All other systems reviewed and are negative.          PAST

## 2023-05-10 LAB — BACTERIA UR CULT: NORMAL

## 2023-05-15 ENCOUNTER — TELEPHONE (OUTPATIENT)
Dept: OBGYN CLINIC | Age: 23
End: 2023-05-15

## 2023-05-15 NOTE — TELEPHONE ENCOUNTER
Tried calling the patient and the number was busy. So I will mychart her to see what she wants to do.

## 2023-05-15 NOTE — TELEPHONE ENCOUNTER
Clare requests that office return their call. The best time to reach her is  as soon as possible . Patient states she thinks there has been some infidelity in relationship and would like to be tested for STD she also states she thinks she has UTI burning sensation after urinating. Patient left voicemail on nurse line. Thank you.

## 2023-08-28 ENCOUNTER — TELEPHONE (OUTPATIENT)
Dept: OBGYN CLINIC | Age: 23
End: 2023-08-28

## 2023-08-28 NOTE — PATIENT INSTRUCTIONS
and keep a list of the medicines you take. How can you care for yourself at home? Take medicines exactly as prescribed. If your doctor prescribed antibiotics, take them as directed. Don't stop taking them just because you feel better. You need to take the full course of antibiotics. Tell your sex partner(s) that they will need treatment. For certain STIs, your doctor may be able to prescribe treatment for your partner(s) also. Don't have sexual contact while you have symptoms of an STI or are being treated for an STI. Don't douche. Douching changes the normal balance of bacteria in your vagina. It may increase the risk of spreading the infection to your uterus or other reproductive organs. How can you prevent sexually transmitted infections (STIs)? You can help prevent STIs if you wait to have sex with a new partner (or partners) until you've each been tested for STIs. It also helps if you use condoms and if you limit your sex partners to one person who has sex only with you. When should you call for help? Call 911 anytime you think you may need emergency care. For example, call if:    You have sudden, severe pain in your belly or pelvis. Call your doctor now or seek immediate medical care if:    You have new belly or pelvic pain. You have a fever. You have new or increased burning or pain with urination, or you cannot urinate. You have pain, swelling, or tenderness in the scrotum. You are pregnant and have any symptoms of an STI. Watch closely for changes in your health, and be sure to contact your doctor if:    You have unusual vaginal bleeding. You have a discharge from the vagina, penis, or anus. You have any new symptoms, such as sores, bumps, rashes, blisters, or warts in the genital or anal area. You have itching, tingling, pain, or burning in the genital or anal area. You think you may have been exposed to an STI.      You have a sore throat or sores in your

## 2023-08-29 ENCOUNTER — OFFICE VISIT (OUTPATIENT)
Dept: OBGYN CLINIC | Age: 23
End: 2023-08-29
Payer: MEDICAID

## 2023-08-29 VITALS
HEIGHT: 64 IN | WEIGHT: 144 LBS | DIASTOLIC BLOOD PRESSURE: 78 MMHG | BODY MASS INDEX: 24.59 KG/M2 | SYSTOLIC BLOOD PRESSURE: 122 MMHG

## 2023-08-29 DIAGNOSIS — Z11.3 SCREENING FOR STDS (SEXUALLY TRANSMITTED DISEASES): Primary | ICD-10-CM

## 2023-08-29 DIAGNOSIS — R35.0 URINARY FREQUENCY: ICD-10-CM

## 2023-08-29 DIAGNOSIS — R30.0 BURNING WITH URINATION: ICD-10-CM

## 2023-08-29 DIAGNOSIS — Z11.3 SCREENING FOR STDS (SEXUALLY TRANSMITTED DISEASES): ICD-10-CM

## 2023-08-29 LAB
HBV SURFACE AG SERPL QL IA: NORMAL
HCV AB SERPL QL IA: NORMAL
RPR SER QL: NORMAL

## 2023-08-29 PROCEDURE — G8427 DOCREV CUR MEDS BY ELIG CLIN: HCPCS | Performed by: ADVANCED PRACTICE MIDWIFE

## 2023-08-29 PROCEDURE — 99213 OFFICE O/P EST LOW 20 MIN: CPT | Performed by: ADVANCED PRACTICE MIDWIFE

## 2023-08-29 PROCEDURE — G8420 CALC BMI NORM PARAMETERS: HCPCS | Performed by: ADVANCED PRACTICE MIDWIFE

## 2023-08-29 PROCEDURE — 1036F TOBACCO NON-USER: CPT | Performed by: ADVANCED PRACTICE MIDWIFE

## 2023-08-29 RX ORDER — CLOTRIMAZOLE AND BETAMETHASONE DIPROPIONATE 10; .64 MG/G; MG/G
CREAM TOPICAL
Qty: 15 G | Refills: 1 | Status: SHIPPED | OUTPATIENT
Start: 2023-08-29

## 2023-08-29 RX ORDER — FLUCONAZOLE 150 MG/1
150 TABLET ORAL
Qty: 2 TABLET | Refills: 0 | Status: SHIPPED | OUTPATIENT
Start: 2023-08-29 | End: 2023-09-04

## 2023-08-31 LAB
BACTERIA UR CULT: ABNORMAL
BACTERIA UR CULT: ABNORMAL
ORGANISM: ABNORMAL

## 2023-08-31 RX ORDER — AMOXICILLIN 500 MG/1
500 CAPSULE ORAL 2 TIMES DAILY
Qty: 14 CAPSULE | Refills: 0 | Status: SHIPPED | OUTPATIENT
Start: 2023-08-31 | End: 2023-09-07

## 2023-09-01 LAB
HSV1+2 IGG SER IA-ACNC: 0.32 IV
HSV1+2 IGM SER IA-ACNC: 0.76 IV

## 2023-11-27 ENCOUNTER — OFFICE VISIT (OUTPATIENT)
Dept: OBGYN CLINIC | Age: 23
End: 2023-11-27
Payer: MEDICAID

## 2023-11-27 VITALS
HEART RATE: 96 BPM | DIASTOLIC BLOOD PRESSURE: 87 MMHG | HEIGHT: 64 IN | BODY MASS INDEX: 25.95 KG/M2 | WEIGHT: 152 LBS | SYSTOLIC BLOOD PRESSURE: 119 MMHG

## 2023-11-27 DIAGNOSIS — N89.8 VAGINAL ODOR: ICD-10-CM

## 2023-11-27 DIAGNOSIS — Z12.4 ENCOUNTER FOR PAPANICOLAOU SMEAR FOR CERVICAL CANCER SCREENING: ICD-10-CM

## 2023-11-27 DIAGNOSIS — Z01.419 ENCOUNTER FOR WELL WOMAN EXAM WITH ROUTINE GYNECOLOGICAL EXAM: Primary | ICD-10-CM

## 2023-11-27 PROCEDURE — 99395 PREV VISIT EST AGE 18-39: CPT | Performed by: ADVANCED PRACTICE MIDWIFE

## 2023-11-27 PROCEDURE — G8484 FLU IMMUNIZE NO ADMIN: HCPCS | Performed by: ADVANCED PRACTICE MIDWIFE

## 2023-11-27 NOTE — PROGRESS NOTES
Pt presents today for pap smear and breast exam.    She also wants to be swabbed, has noticed a smell- not bad, but not her normal. She is not active, feels very sensitive since having her son so just wants to check for BV/ yeast.    Last mammogram: never  Last pap smear: never had one  Sexually active: Not currently  Sexual preference: Male  Contraception: nothing, she is not active  : 1  Para: 1  AB: 0  Last bone density: never   Last colonoscopy: never  Menarche: 17years old  LMP: 2023 approx.   Menses: irregular, they come when they come

## 2023-11-28 DIAGNOSIS — N89.8 VAGINAL ODOR: ICD-10-CM

## 2023-11-29 RX ORDER — DOXYCYCLINE HYCLATE 100 MG
100 TABLET ORAL 2 TIMES DAILY
Qty: 14 TABLET | Refills: 0 | Status: SHIPPED | OUTPATIENT
Start: 2023-11-29 | End: 2023-12-06

## 2023-11-29 RX ORDER — METRONIDAZOLE 500 MG/1
500 TABLET ORAL 2 TIMES DAILY
Qty: 14 TABLET | Refills: 0 | Status: SHIPPED | OUTPATIENT
Start: 2023-11-29 | End: 2023-12-06

## 2024-01-30 ENCOUNTER — TELEPHONE (OUTPATIENT)
Dept: OBGYN CLINIC | Age: 24
End: 2024-01-30

## 2024-01-30 NOTE — TELEPHONE ENCOUNTER
Called to reschedule, patient could not come at any times I offered, pt will call back when she knows her availability. 1/30/24 AE

## 2024-01-30 NOTE — TELEPHONE ENCOUNTER
Patient called back to reschedule her pregnancy confirmation appointment. She would like to be seen asap.

## 2024-04-17 ENCOUNTER — TELEPHONE (OUTPATIENT)
Dept: OBGYN CLINIC | Age: 24
End: 2024-04-17

## 2024-04-17 RX ORDER — BUSPIRONE HYDROCHLORIDE 10 MG/1
10 TABLET ORAL 3 TIMES DAILY PRN
Qty: 90 TABLET | Refills: 0 | Status: SHIPPED | OUTPATIENT
Start: 2024-04-17

## 2024-05-09 ENCOUNTER — TELEPHONE (OUTPATIENT)
Dept: OBSTETRICS AND GYNECOLOGY | Age: 24
End: 2024-05-09

## 2024-05-09 NOTE — TELEPHONE ENCOUNTER
Caller: Dinorah Bond    Relationship: Self    Best call back number: [phone] (home)       What form or medical record are you requesting: PAPERWORK FOR SERVICE ANIMAL    Who is requesting this form or medical record from you: APARTMENT      Additional notes: PT CALLED IN ASKING OF  COULD FILL OUT PAPERWORK FOR HER APT TO ALLOW HER SERVICE ANIMAL INTO APT. WANTS TO KNOW IF  COULD DO THAT SINCE SHE IS THE ONE THAT PRESCRIBED HER MEDICATIONS FOR ANXIETY AND THE PRIMARY CAUSE OF THE SERVICE ANIMAL IS ANXIETY. WANTS TO KNOW IF SHE NEEDS TO SCHEDULE APPT OR IF SHE CAN BRING THE PAPERWORK TO OFFICE IF THIS IS POSSIBLE. OK TO M, OK TO ANSWER THROUGH [facility].

## 2024-05-10 NOTE — TELEPHONE ENCOUNTER
Called and spoke with pt. She was seeing us a while ago but has since transferred to [facility]. Thanks!

## 2024-05-17 ENCOUNTER — PATIENT MESSAGE (OUTPATIENT)
Dept: OBGYN CLINIC | Age: 24
End: 2024-05-17

## 2024-05-17 ENCOUNTER — TELEPHONE (OUTPATIENT)
Dept: OBGYN CLINIC | Age: 24
End: 2024-05-17

## 2024-08-08 ENCOUNTER — OFFICE VISIT (OUTPATIENT)
Dept: OBGYN CLINIC | Age: 24
End: 2024-08-08

## 2024-08-08 VITALS
SYSTOLIC BLOOD PRESSURE: 119 MMHG | WEIGHT: 158 LBS | DIASTOLIC BLOOD PRESSURE: 79 MMHG | HEART RATE: 87 BPM | BODY MASS INDEX: 27.12 KG/M2

## 2024-08-08 DIAGNOSIS — N92.6 IRREGULAR MENSES: ICD-10-CM

## 2024-08-08 DIAGNOSIS — N89.8 VAGINAL ODOR: Primary | ICD-10-CM

## 2024-08-08 DIAGNOSIS — R10.2 PELVIC PAIN: ICD-10-CM

## 2024-08-08 DIAGNOSIS — R30.0 DYSURIA: ICD-10-CM

## 2024-08-08 LAB
BILIRUBIN, POC: NORMAL
BLOOD URINE, POC: NORMAL
CLARITY, POC: CLEAR
COLOR, POC: YELLOW
CONTROL: PRESENT
GLUCOSE URINE, POC: NORMAL
KETONES, POC: NORMAL
LEUKOCYTE EST, POC: NORMAL
NITRITE, POC: NORMAL
PH, POC: 7
PREGNANCY TEST URINE, POC: NORMAL
PROTEIN, POC: NORMAL
SPECIFIC GRAVITY, POC: 1.01
UROBILINOGEN, POC: 0.2

## 2024-08-08 SDOH — ECONOMIC STABILITY: INCOME INSECURITY: HOW HARD IS IT FOR YOU TO PAY FOR THE VERY BASICS LIKE FOOD, HOUSING, MEDICAL CARE, AND HEATING?: HARD

## 2024-08-08 SDOH — ECONOMIC STABILITY: FOOD INSECURITY: WITHIN THE PAST 12 MONTHS, THE FOOD YOU BOUGHT JUST DIDN'T LAST AND YOU DIDN'T HAVE MONEY TO GET MORE.: OFTEN TRUE

## 2024-08-08 SDOH — ECONOMIC STABILITY: FOOD INSECURITY: WITHIN THE PAST 12 MONTHS, YOU WORRIED THAT YOUR FOOD WOULD RUN OUT BEFORE YOU GOT MONEY TO BUY MORE.: OFTEN TRUE

## 2024-08-08 SDOH — ECONOMIC STABILITY: TRANSPORTATION INSECURITY
IN THE PAST 12 MONTHS, HAS LACK OF TRANSPORTATION KEPT YOU FROM MEETINGS, WORK, OR FROM GETTING THINGS NEEDED FOR DAILY LIVING?: PATIENT DECLINED

## 2024-08-08 SDOH — ECONOMIC STABILITY: HOUSING INSECURITY
IN THE LAST 12 MONTHS, WAS THERE A TIME WHEN YOU DID NOT HAVE A STEADY PLACE TO SLEEP OR SLEPT IN A SHELTER (INCLUDING NOW)?: NO

## 2024-08-08 ASSESSMENT — PATIENT HEALTH QUESTIONNAIRE - PHQ9
5. POOR APPETITE OR OVEREATING: SEVERAL DAYS
9. THOUGHTS THAT YOU WOULD BE BETTER OFF DEAD, OR OF HURTING YOURSELF: NOT AT ALL
4. FEELING TIRED OR HAVING LITTLE ENERGY: NOT AT ALL
2. FEELING DOWN, DEPRESSED OR HOPELESS: NOT AT ALL
SUM OF ALL RESPONSES TO PHQ QUESTIONS 1-9: 1
5. POOR APPETITE OR OVEREATING: SEVERAL DAYS
SUM OF ALL RESPONSES TO PHQ QUESTIONS 1-9: 1
10. IF YOU CHECKED OFF ANY PROBLEMS, HOW DIFFICULT HAVE THESE PROBLEMS MADE IT FOR YOU TO DO YOUR WORK, TAKE CARE OF THINGS AT HOME, OR GET ALONG WITH OTHER PEOPLE: NOT DIFFICULT AT ALL
3. TROUBLE FALLING OR STAYING ASLEEP: NOT AT ALL
4. FEELING TIRED OR HAVING LITTLE ENERGY: NOT AT ALL
7. TROUBLE CONCENTRATING ON THINGS, SUCH AS READING THE NEWSPAPER OR WATCHING TELEVISION: NOT AT ALL
6. FEELING BAD ABOUT YOURSELF - OR THAT YOU ARE A FAILURE OR HAVE LET YOURSELF OR YOUR FAMILY DOWN: NOT AT ALL
8. MOVING OR SPEAKING SO SLOWLY THAT OTHER PEOPLE COULD HAVE NOTICED. OR THE OPPOSITE, BEING SO FIGETY OR RESTLESS THAT YOU HAVE BEEN MOVING AROUND A LOT MORE THAN USUAL: NOT AT ALL
2. FEELING DOWN, DEPRESSED OR HOPELESS: NOT AT ALL
SUM OF ALL RESPONSES TO PHQ9 QUESTIONS 1 & 2: 0
7. TROUBLE CONCENTRATING ON THINGS, SUCH AS READING THE NEWSPAPER OR WATCHING TELEVISION: NOT AT ALL
8. MOVING OR SPEAKING SO SLOWLY THAT OTHER PEOPLE COULD HAVE NOTICED. OR THE OPPOSITE - BEING SO FIDGETY OR RESTLESS THAT YOU HAVE BEEN MOVING AROUND A LOT MORE THAN USUAL: NOT AT ALL
SUM OF ALL RESPONSES TO PHQ QUESTIONS 1-9: 1
SUM OF ALL RESPONSES TO PHQ QUESTIONS 1-9: 1
9. THOUGHTS THAT YOU WOULD BE BETTER OFF DEAD, OR OF HURTING YOURSELF: NOT AT ALL
1. LITTLE INTEREST OR PLEASURE IN DOING THINGS: NOT AT ALL
SUM OF ALL RESPONSES TO PHQ QUESTIONS 1-9: 1
1. LITTLE INTEREST OR PLEASURE IN DOING THINGS: NOT AT ALL
10. IF YOU CHECKED OFF ANY PROBLEMS, HOW DIFFICULT HAVE THESE PROBLEMS MADE IT FOR YOU TO DO YOUR WORK, TAKE CARE OF THINGS AT HOME, OR GET ALONG WITH OTHER PEOPLE: NOT DIFFICULT AT ALL
3. TROUBLE FALLING OR STAYING ASLEEP: NOT AT ALL
6. FEELING BAD ABOUT YOURSELF - OR THAT YOU ARE A FAILURE OR HAVE LET YOURSELF OR YOUR FAMILY DOWN: NOT AT ALL

## 2024-08-08 ASSESSMENT — ENCOUNTER SYMPTOMS
DIARRHEA: 0
RESPIRATORY NEGATIVE: 1
CONSTIPATION: 0
EYES NEGATIVE: 1
GASTROINTESTINAL NEGATIVE: 1
ALLERGIC/IMMUNOLOGIC NEGATIVE: 1

## 2024-08-08 NOTE — PROGRESS NOTES
Pt states she is having a few uti symptoms such as, urine odor, pain while urinating, lower abdominal discomfort. She started drinking more water and odor, pain with urination, and pelvic pain has not been as bad now she started drinking more water.

## 2024-08-08 NOTE — PROGRESS NOTES
Clare Antunez is a 23 y.o. female who presents today for her medical conditions/ complaints as noted below. Clare Antunez is c/o of Vaginal Odor (/), Dysuria, and Pelvic Pain        HPI  Pt presents c/o recurrent pelvic pain, pain with urination, vaginal irritation and odor. Currently with one sexual partner and reports recent negative STD testing. Not using birth control and requesting UPT in office today. History of BV and took treatment in 2023. Feels like symptoms improve and then come back. Avoids tub baths, wipes front to back, rarely wears thong underwear.    No LMP recorded (lmp unknown).      Past Medical History:   Diagnosis Date    Anxiety      Past Surgical History:   Procedure Laterality Date    ADENOIDECTOMY      TONSILLECTOMY      TYMPANOSTOMY TUBE PLACEMENT      WISDOM TOOTH EXTRACTION       No family history on file.  Social History     Tobacco Use    Smoking status: Never     Passive exposure: Yes    Smokeless tobacco: Never   Substance Use Topics    Alcohol use: No       Current Outpatient Medications   Medication Sig Dispense Refill    busPIRone (BUSPAR) 10 MG tablet Take 1 tablet by mouth 3 times daily as needed (anxiety) 90 tablet 0     No current facility-administered medications for this visit.     No Known Allergies  Vitals:    24 1049   BP: 119/79   Pulse: 87     Body mass index is 27.12 kg/m².    Review of Systems   Constitutional: Negative.    HENT: Negative.     Eyes: Negative.    Respiratory: Negative.     Cardiovascular: Negative.    Gastrointestinal: Negative.  Negative for constipation and diarrhea.   Endocrine: Negative.    Genitourinary:  Positive for dysuria, pelvic pain and vaginal discharge. Negative for frequency, menstrual problem and urgency.   Musculoskeletal: Negative.    Skin: Negative.    Allergic/Immunologic: Negative.    Neurological: Negative.    Hematological: Negative.    Psychiatric/Behavioral: Negative.     All other systems reviewed and

## 2024-08-15 RX ORDER — METRONIDAZOLE 500 MG/1
500 TABLET ORAL 2 TIMES DAILY
Qty: 14 TABLET | Refills: 0 | Status: SHIPPED | OUTPATIENT
Start: 2024-08-15 | End: 2024-08-22

## 2024-08-15 RX ORDER — METRONIDAZOLE 7.5 MG/G
1 GEL VAGINAL WEEKLY
Qty: 1 EACH | Refills: 3 | Status: SHIPPED | OUTPATIENT
Start: 2024-08-15 | End: 2024-09-14

## 2024-08-15 RX ORDER — DOXYCYCLINE HYCLATE 100 MG
100 TABLET ORAL 2 TIMES DAILY
Qty: 14 TABLET | Refills: 0 | Status: SHIPPED | OUTPATIENT
Start: 2024-08-15 | End: 2024-08-22

## 2024-09-18 ENCOUNTER — TELEPHONE (OUTPATIENT)
Dept: OBGYN CLINIC | Age: 24
End: 2024-09-18

## 2024-09-18 DIAGNOSIS — R30.0 BURNING WITH URINATION: Primary | ICD-10-CM

## 2024-10-03 ENCOUNTER — TELEPHONE (OUTPATIENT)
Dept: OBGYN CLINIC | Age: 24
End: 2024-10-03

## 2024-10-03 NOTE — TELEPHONE ENCOUNTER
Patient left message on nurse line stating she wanted to discuss some symptoms she was having. Attempted to return call, left marquis Natarajan sent also

## 2024-10-04 ENCOUNTER — OFFICE VISIT (OUTPATIENT)
Dept: OBGYN CLINIC | Age: 24
End: 2024-10-04
Payer: MEDICAID

## 2024-10-04 VITALS
HEART RATE: 90 BPM | DIASTOLIC BLOOD PRESSURE: 88 MMHG | BODY MASS INDEX: 26.95 KG/M2 | SYSTOLIC BLOOD PRESSURE: 126 MMHG | WEIGHT: 157 LBS

## 2024-10-04 DIAGNOSIS — Z22.39 CARRIER OF UREAPLASMA UREALYTICUM: ICD-10-CM

## 2024-10-04 DIAGNOSIS — B37.9 YEAST INFECTION: ICD-10-CM

## 2024-10-04 DIAGNOSIS — N89.8 VAGINAL ITCHING: Primary | ICD-10-CM

## 2024-10-04 PROCEDURE — 99214 OFFICE O/P EST MOD 30 MIN: CPT | Performed by: NURSE PRACTITIONER

## 2024-10-04 PROCEDURE — G8484 FLU IMMUNIZE NO ADMIN: HCPCS | Performed by: NURSE PRACTITIONER

## 2024-10-04 PROCEDURE — 1036F TOBACCO NON-USER: CPT | Performed by: NURSE PRACTITIONER

## 2024-10-04 PROCEDURE — G8427 DOCREV CUR MEDS BY ELIG CLIN: HCPCS | Performed by: NURSE PRACTITIONER

## 2024-10-04 PROCEDURE — G8419 CALC BMI OUT NRM PARAM NOF/U: HCPCS | Performed by: NURSE PRACTITIONER

## 2024-10-04 RX ORDER — FLUCONAZOLE 150 MG/1
150 TABLET ORAL
Qty: 2 TABLET | Refills: 0 | Status: SHIPPED | OUTPATIENT
Start: 2024-10-04 | End: 2024-10-10

## 2024-10-04 RX ORDER — NYSTATIN AND TRIAMCINOLONE ACETONIDE 100000; 1 [USP'U]/G; MG/G
CREAM TOPICAL
Qty: 1 EACH | Refills: 0 | Status: SHIPPED | OUTPATIENT
Start: 2024-10-04

## 2024-10-04 ASSESSMENT — ENCOUNTER SYMPTOMS
DIARRHEA: 0
RESPIRATORY NEGATIVE: 1
EYES NEGATIVE: 1
ALLERGIC/IMMUNOLOGIC NEGATIVE: 1
GASTROINTESTINAL NEGATIVE: 1
CONSTIPATION: 0

## 2024-10-04 NOTE — PROGRESS NOTES
Pt states she was on antibiotics from the last time she was seen for bv, she is now experiencing a yeast infection but only at the top of clitoris area. And sje still feels like her previous infection hasn’t gotten better. She is wanting to see what she can do because she is constantly getting infections and the last time she was here, she talked about everything that she was doing. She states she is taking all the precautions during sex taking showers after sex peeing after sex, only using water to rinse off on a daily basis wiping front to back. She is doing everything right and is still continuously getting infections. Pt c/o a different vaginal smell. She doesn't feel like her previous bv infection has gone away. Every time she gets rid of an infection it comes back. She was given metrogel in August.   
negative.        Physical Exam  Vitals and nursing note reviewed.   Constitutional:       Appearance: She is well-developed.   HENT:      Head: Normocephalic.      Right Ear: External ear normal.      Left Ear: External ear normal.      Nose: Nose normal.   Genitourinary:     Vagina: Vaginal discharge (thin white), erythema and tenderness present. No bleeding.          Comments: Erythema and swelling, no isolated lesions  Swab collected  Musculoskeletal:         General: Normal range of motion.      Cervical back: Normal range of motion.   Skin:     General: Skin is warm and dry.   Neurological:      Mental Status: She is alert and oriented to person, place, and time.   Psychiatric:         Attention and Perception: Attention normal.         Mood and Affect: Mood normal.         Speech: Speech normal.         Behavior: Behavior normal.         Thought Content: Thought content normal.         Cognition and Memory: Cognition normal.         Judgment: Judgment normal.              Diagnosis Orders   1. Vaginal itching  Miscellaneous Sendout 2      2. 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     Dispense:  1 each     Refill:  0       ORDERS:  Orders Placed This Encounter   Procedures    Miscellaneous Sendout 2       PLAN:  Tx given for yeast on exam  Diatherix pending  Continue suppressive therapy with Metrogel  May consider partner treatment if persistent Ureaplasma    There are no Patient Instructions on file for this visit.

## 2024-10-10 RX ORDER — METRONIDAZOLE 500 MG/1
500 TABLET ORAL 2 TIMES DAILY
Qty: 14 TABLET | Refills: 0 | Status: SHIPPED | OUTPATIENT
Start: 2024-10-10 | End: 2024-10-17

## 2024-10-11 ENCOUNTER — OFFICE VISIT (OUTPATIENT)
Age: 24
End: 2024-10-11

## 2024-10-11 ENCOUNTER — HOSPITAL ENCOUNTER (OUTPATIENT)
Dept: GENERAL RADIOLOGY | Age: 24
Discharge: HOME | End: 2024-10-11
Payer: MEDICAID

## 2024-10-11 VITALS
TEMPERATURE: 98 F | BODY MASS INDEX: 26.33 KG/M2 | WEIGHT: 153.4 LBS | SYSTOLIC BLOOD PRESSURE: 116 MMHG | HEART RATE: 85 BPM | OXYGEN SATURATION: 96 % | DIASTOLIC BLOOD PRESSURE: 62 MMHG | RESPIRATION RATE: 20 BRPM

## 2024-10-11 DIAGNOSIS — R10.30 LOWER ABDOMINAL PAIN: ICD-10-CM

## 2024-10-11 DIAGNOSIS — Z75.8 DOES NOT HAVE PRIMARY CARE PROVIDER: ICD-10-CM

## 2024-10-11 DIAGNOSIS — R11.0 NAUSEA: ICD-10-CM

## 2024-10-11 DIAGNOSIS — R10.30 LOWER ABDOMINAL PAIN: Primary | ICD-10-CM

## 2024-10-11 DIAGNOSIS — K59.00 CONSTIPATION, UNSPECIFIED CONSTIPATION TYPE: ICD-10-CM

## 2024-10-11 DIAGNOSIS — N92.6 MISSED PERIOD: ICD-10-CM

## 2024-10-11 LAB
APPEARANCE FLUID: CLEAR
BILIRUBIN, POC: NORMAL
BLOOD URINE, POC: NORMAL
CLARITY, POC: CLEAR
COLOR, POC: YELLOW
CONTROL: PRESENT
GLUCOSE URINE, POC: NORMAL MG/DL
KETONES, POC: NORMAL MG/DL
LEUKOCYTE EST, POC: NORMAL
NITRITE, POC: NORMAL
PH, POC: 7
PREGNANCY TEST URINE, POC: NEGATIVE
PROTEIN, POC: NORMAL MG/DL
SPECIFIC GRAVITY, POC: 1.02
UROBILINOGEN, POC: 0.2 MG/DL

## 2024-10-11 PROCEDURE — 74018 RADEX ABDOMEN 1 VIEW: CPT

## 2024-10-11 RX ORDER — ONDANSETRON 4 MG/1
4 TABLET, ORALLY DISINTEGRATING ORAL 3 TIMES DAILY PRN
Qty: 21 TABLET | Refills: 0 | Status: SHIPPED | OUTPATIENT
Start: 2024-10-11

## 2024-10-11 ASSESSMENT — ENCOUNTER SYMPTOMS
VOMITING: 0
NAUSEA: 1
FACIAL SWELLING: 0
CHEST TIGHTNESS: 0
EYE REDNESS: 0
ABDOMINAL PAIN: 1
CONSTIPATION: 1
STRIDOR: 0
EYE PAIN: 0
EYE DISCHARGE: 0
COLOR CHANGE: 0
TROUBLE SWALLOWING: 0
SHORTNESS OF BREATH: 0
SINUS PRESSURE: 0
APNEA: 0
ABDOMINAL DISTENTION: 0
WHEEZING: 0
CHOKING: 0
SORE THROAT: 0
COUGH: 0
SINUS PAIN: 0
DIARRHEA: 1

## 2024-10-11 NOTE — PATIENT INSTRUCTIONS
Urine pregnancy test was negative    Urinalysis is positive for trace leuks    Abdominal xray ordered. We will call with results    Zofran ODT as needed for nausea and vomiting prescribed.     Encouraged fluids, bland foods (such as dry toast, plain baked potato, chicken noodle soup), rest, and monitor for signs/symptoms of dehydration.     Advised patient that etiology is likely viral and symptoms may last 2-3 days.     If not improving in 2-3 days, follow up with PCP.     Report to ER if symptoms worsening or become severe.     Note given for today    Any condition can change, despite proper treatment. Therefore, if symptoms still persist or worsen after treatment plan intitated today, either go to the nearest ER, or call PCP, or return to UC for further evaluation.    Urgent Care evaluation today is not a substitute for PCP visit. Follow up care is your responsibility to discuss and review this UC visit.

## 2024-10-11 NOTE — PROGRESS NOTES
[facility]  [facility]  [address] [place] [id number]  Dept: [phone]  Dept Fax: [phone]  Loc: [phone]    Clare Antunez is a 23 y.o. female who presents today for her medical conditions/complaints as noted below.  Clare Antunez is complaining of Abdominal Pain (Patient stated was really bad stomach cramping )        HPI:   Clare Antunez presents to the clinic today with complaints of lower abdominal pain, cramping, and irregular bowel movements.  She reports she had an episode of diarrhea on Wednesday/Thursday and has not had a bowel movement since.  She reports history of frequent constipation.  Admits some nausea.  Denies fever, body aches, chills, vomiting.  Denies known exposure to sick contacts.  No alleviating factors are reported for this.  Symptoms are moderate.  Patient stable.    Abdominal Pain  Associated symptoms include constipation, diarrhea (single episode) and nausea. Pertinent negatives include no arthralgias, dysuria, fever, frequency, headaches, hematuria, myalgias, rash, sore throat or vomiting.       Past Medical History:   Diagnosis Date    Anxiety        Past Surgical History:   Procedure Laterality Date    ADENOIDECTOMY      TONSILLECTOMY      TYMPANOSTOMY TUBE PLACEMENT      WISDOM TOOTH EXTRACTION         History reviewed. No pertinent family history.    Social History     Tobacco Use    Smoking status: Never     Passive exposure: Yes    Smokeless tobacco: Never   Substance Use Topics    Alcohol use: No        Current Outpatient Medications   Medication Sig Dispense Refill    ondansetron (ZOFRAN-ODT) 4 MG disintegrating tablet Take 1 tablet by mouth 3 times daily as needed for Nausea or Vomiting 21 tablet 0    metroNIDAZOLE (FLAGYL) 500 MG tablet Take 1 tablet by mouth 2 times daily for 7 days 14 tablet 0    nystatin-triamcinolone (MYCOLOG II) 241028-5.1 UNIT/GM-% cream Apply topically 2 times daily. 1 each

## 2024-10-21 ENCOUNTER — TELEPHONE (OUTPATIENT)
Dept: PRIMARY CARE CLINIC | Age: 24
End: 2024-10-21

## 2024-10-24 ENCOUNTER — OFFICE VISIT (OUTPATIENT)
Dept: PRIMARY CARE CLINIC | Age: 24
End: 2024-10-24
Payer: MEDICAID

## 2024-10-24 VITALS
OXYGEN SATURATION: 98 % | WEIGHT: 154.4 LBS | DIASTOLIC BLOOD PRESSURE: 70 MMHG | HEART RATE: 92 BPM | TEMPERATURE: 97.2 F | SYSTOLIC BLOOD PRESSURE: 116 MMHG | BODY MASS INDEX: 26.5 KG/M2

## 2024-10-24 DIAGNOSIS — F41.9 ANXIETY: ICD-10-CM

## 2024-10-24 DIAGNOSIS — Z76.89 ENCOUNTER TO ESTABLISH CARE: Primary | ICD-10-CM

## 2024-10-24 PROCEDURE — 1036F TOBACCO NON-USER: CPT | Performed by: NURSE PRACTITIONER

## 2024-10-24 PROCEDURE — G8484 FLU IMMUNIZE NO ADMIN: HCPCS | Performed by: NURSE PRACTITIONER

## 2024-10-24 PROCEDURE — 99204 OFFICE O/P NEW MOD 45 MIN: CPT | Performed by: NURSE PRACTITIONER

## 2024-10-24 PROCEDURE — G8419 CALC BMI OUT NRM PARAM NOF/U: HCPCS | Performed by: NURSE PRACTITIONER

## 2024-10-24 PROCEDURE — G8427 DOCREV CUR MEDS BY ELIG CLIN: HCPCS | Performed by: NURSE PRACTITIONER

## 2024-10-24 RX ORDER — FLUOXETINE 10 MG/1
10 CAPSULE ORAL DAILY
Qty: 30 CAPSULE | Refills: 0 | Status: SHIPPED | OUTPATIENT
Start: 2024-10-24

## 2024-10-24 SDOH — HEALTH STABILITY: PHYSICAL HEALTH: ON AVERAGE, HOW MANY DAYS PER WEEK DO YOU ENGAGE IN MODERATE TO STRENUOUS EXERCISE (LIKE A BRISK WALK)?: 2 DAYS

## 2024-10-24 SDOH — HEALTH STABILITY: PHYSICAL HEALTH: ON AVERAGE, HOW MANY MINUTES DO YOU ENGAGE IN EXERCISE AT THIS LEVEL?: 20 MIN

## 2024-10-24 ASSESSMENT — ANXIETY QUESTIONNAIRES
2. NOT BEING ABLE TO STOP OR CONTROL WORRYING: NEARLY EVERY DAY
3. WORRYING TOO MUCH ABOUT DIFFERENT THINGS: NEARLY EVERY DAY
GAD7 TOTAL SCORE: 21
7. FEELING AFRAID AS IF SOMETHING AWFUL MIGHT HAPPEN: NEARLY EVERY DAY
4. TROUBLE RELAXING: NEARLY EVERY DAY
1. FEELING NERVOUS, ANXIOUS, OR ON EDGE: NEARLY EVERY DAY
IF YOU CHECKED OFF ANY PROBLEMS ON THIS QUESTIONNAIRE, HOW DIFFICULT HAVE THESE PROBLEMS MADE IT FOR YOU TO DO YOUR WORK, TAKE CARE OF THINGS AT HOME, OR GET ALONG WITH OTHER PEOPLE: VERY DIFFICULT
6. BECOMING EASILY ANNOYED OR IRRITABLE: NEARLY EVERY DAY
5. BEING SO RESTLESS THAT IT IS HARD TO SIT STILL: NEARLY EVERY DAY

## 2024-10-24 NOTE — PROGRESS NOTES
[address] [place] [address] Drive   [address] [facility], [address]     Phone:  [phone]  Fax:  [phone]      Clare Antunez is a 23 y.o. female who presents today for her medical conditions/complaints as noted below.  Clare Antunez is c/o of New Patient      Chief Complaint   Patient presents with    New Patient       HPI:     Patient presents in office to establish care, has never had a PCP before. Does have anxiety and has previously taken buspar with some relief. Is smoking occasionally. Recently had  vulvar and vaginal itching and diatherix was positive for gardnerella. Was treated with flagyl and symptoms have resolved.     Past Medical History:   Diagnosis Date    Anxiety         Past Surgical History:   Procedure Laterality Date    ADENOIDECTOMY      TONSILLECTOMY      TYMPANOSTOMY TUBE PLACEMENT      WISDOM TOOTH EXTRACTION         Social History     Tobacco Use    Smoking status: Never     Passive exposure: Yes    Smokeless tobacco: Never   Substance Use Topics    Alcohol use: No        Current Outpatient Medications   Medication Sig Dispense Refill    FLUoxetine (PROZAC) 10 MG capsule Take 1 capsule by mouth daily 30 capsule 0    ondansetron (ZOFRAN-ODT) 4 MG disintegrating tablet Take 1 tablet by mouth 3 times daily as needed for Nausea or Vomiting 21 tablet 0    busPIRone (BUSPAR) 10 MG tablet Take 1 tablet by mouth 3 times daily as needed (anxiety) 90 tablet 0    nystatin-triamcinolone (MYCOLOG II) 170487-9.1 UNIT/GM-% cream Apply topically 2 times daily. (Patient not taking: Reported on 10/24/2024) 1 each 0     No current facility-administered medications for this visit.       No Known Allergies    History reviewed. No pertinent family history.            Subjective:      Review of Systems   Constitutional:  Negative for activity change and fever.   HENT:  Negative for congestion, ear pain and sore throat.    Respiratory:  Negative for cough, chest tightness and shortness of breath.

## 2024-11-25 ENCOUNTER — TELEPHONE (OUTPATIENT)
Dept: PRIMARY CARE CLINIC | Age: 24
End: 2024-11-25

## 2024-11-26 ENCOUNTER — TELEPHONE (OUTPATIENT)
Dept: PRIMARY CARE CLINIC | Age: 24
End: 2024-11-26

## 2024-12-06 DIAGNOSIS — F41.9 ANXIETY: ICD-10-CM

## 2024-12-09 RX ORDER — FLUOXETINE 10 MG/1
10 CAPSULE ORAL DAILY
Qty: 30 CAPSULE | Refills: 0 | Status: SHIPPED | OUTPATIENT
Start: 2024-12-09

## 2024-12-17 ENCOUNTER — TELEPHONE (OUTPATIENT)
Dept: PRIMARY CARE CLINIC | Age: 24
End: 2024-12-17

## 2024-12-17 NOTE — TELEPHONE ENCOUNTER
4th no show for patient on 12.17. Per Elizabeth bejarano to r/s. [facility] to call us back.

## 2025-01-03 ENCOUNTER — OFFICE VISIT (OUTPATIENT)
Age: 25
End: 2025-01-03
Payer: MEDICAID

## 2025-01-03 VITALS
HEART RATE: 86 BPM | BODY MASS INDEX: 26.43 KG/M2 | TEMPERATURE: 97.3 F | WEIGHT: 154 LBS | OXYGEN SATURATION: 98 % | SYSTOLIC BLOOD PRESSURE: 116 MMHG | RESPIRATION RATE: 20 BRPM | DIASTOLIC BLOOD PRESSURE: 60 MMHG

## 2025-01-03 DIAGNOSIS — R05.1 ACUTE COUGH: ICD-10-CM

## 2025-01-03 DIAGNOSIS — Z20.2 EXPOSURE TO STD: Primary | ICD-10-CM

## 2025-01-03 DIAGNOSIS — J02.9 SORE THROAT: ICD-10-CM

## 2025-01-03 DIAGNOSIS — J06.9 VIRAL UPPER RESPIRATORY ILLNESS: ICD-10-CM

## 2025-01-03 LAB
APPEARANCE FLUID: CLEAR
BILIRUBIN, POC: NEGATIVE
BLOOD URINE, POC: ABNORMAL
CLARITY, POC: CLEAR
COLOR, POC: YELLOW
CONTROL: NORMAL
GLUCOSE URINE, POC: NEGATIVE MG/DL
HCV AB SERPL QL IA: NORMAL
INFLUENZA A ANTIBODY: NEGATIVE
INFLUENZA B ANTIBODY: NEGATIVE
KETONES, POC: NEGATIVE MG/DL
LEUKOCYTE EST, POC: ABNORMAL
Lab: NORMAL
NITRITE, POC: NEGATIVE
PH, POC: 6
PREGNANCY TEST URINE, POC: NEGATIVE
PROTEIN, POC: NEGATIVE MG/DL
QC PASS/FAIL: NORMAL
S PYO AG THROAT QL: NORMAL
SARS-COV-2, POC: NORMAL
SPECIFIC GRAVITY, POC: >=1.03
SPECIMEN SOURCE: NORMAL
UROBILINOGEN, POC: ABNORMAL MG/DL

## 2025-01-03 PROCEDURE — G8419 CALC BMI OUT NRM PARAM NOF/U: HCPCS

## 2025-01-03 PROCEDURE — 87880 STREP A ASSAY W/OPTIC: CPT

## 2025-01-03 PROCEDURE — 87811 SARS-COV-2 COVID19 W/OPTIC: CPT

## 2025-01-03 PROCEDURE — 99213 OFFICE O/P EST LOW 20 MIN: CPT

## 2025-01-03 PROCEDURE — 36415 COLL VENOUS BLD VENIPUNCTURE: CPT

## 2025-01-03 PROCEDURE — 81025 URINE PREGNANCY TEST: CPT

## 2025-01-03 PROCEDURE — 1036F TOBACCO NON-USER: CPT

## 2025-01-03 PROCEDURE — G8427 DOCREV CUR MEDS BY ELIG CLIN: HCPCS

## 2025-01-03 PROCEDURE — 87804 INFLUENZA ASSAY W/OPTIC: CPT

## 2025-01-03 PROCEDURE — 81002 URINALYSIS NONAUTO W/O SCOPE: CPT

## 2025-01-03 ASSESSMENT — ENCOUNTER SYMPTOMS
SINUS PAIN: 0
DIARRHEA: 0
WHEEZING: 0
EYE PAIN: 0
COUGH: 1
EYE ITCHING: 0
SHORTNESS OF BREATH: 0
NAUSEA: 0
COLOR CHANGE: 0
CONSTIPATION: 0
APNEA: 0
CHEST TIGHTNESS: 0
TROUBLE SWALLOWING: 0
ABDOMINAL DISTENTION: 0
SINUS PRESSURE: 0
SORE THROAT: 1
RHINORRHEA: 0
EYE DISCHARGE: 0
ABDOMINAL PAIN: 0
VOMITING: 0

## 2025-01-03 NOTE — PATIENT INSTRUCTIONS
Flu, COVID, and Strep test negative in office today.  Urinalysis shows small leukocytes and small blood. Considering lack of UTI symptoms and higher concern of STI, will not send for culture at this time.  Urine pregnancy test negative.  Advised patient that this is most likely viral in etiology.   Increase fluids and rest.  Throat lozenges, chloraseptic sprays, honey, or salt water gargles as needed for sore throat.  Advise bland diet and advance as tolerated.  Recommended OTC antihistamine, such as Zyrtec.   Recommended OTC Robitussin or Delsym for cough.  Monitor for fever and treat as needed with tylenol or ibuprofen, unless contraindicated.   Diatherix testing sent to lab, will call with results. No medications prescribed today, further treatment pending labs. Abstain from sexual activity until labs return and treatment is complete, if applicable. Avoid baths.   HIV, Hep C, HSV, and syphilis blood work sent.  We will call with results.  Follow up with PCP or OBGYN if symptoms worsen or fail to improve.   Work note given for today and tomorrow.

## 2025-01-03 NOTE — PROGRESS NOTES
complete, if applicable. Avoid baths.   HIV, Hep C, HSV, and syphilis blood work sent.  We will call with results.  Follow up with PCP or OBGYN if symptoms worsen or fail to improve.   Work note given for today and tomorrow.    Orders Placed This Encounter   Procedures    HSV PCR     Order Specific Question:   HSV Source?     Answer:   blood    MISCELLANEOUS SENDOUT diatherix STD, BV, yeast     Order Specific Question:   Specify Req. Test (1 Test/Order)     Answer:   diatherix STD, BV, yeast    Hepatitis C Antibody    HIV-1/2 Combo Antigen/Antibody by [facility] Reflex Panel    RPR Reflex to Titer and TPPA     Standing Status:   Future     Standing Expiration Date:   1/3/2026    POCT Influenza A/B    POCT rapid strep A    POCT Urinalysis no Micro    POCT urine pregnancy    POCT COVID-19 Antigen Card       Results for orders placed or performed in visit on 01/03/25   POCT Influenza A/B   Result Value Ref Range    Influenza A Ab negative     Influenza B Ab negative    POCT rapid strep A   Result Value Ref Range    Strep A Ag None Detected None Detected   POCT Urinalysis no Micro   Result Value Ref Range    Color, UA yellow     Clarity, UA clear     Glucose, UA POC negative mg/dL    Bilirubin, UA negative     Ketones, UA negative mg/dL    Spec Grav, UA >=1.030     Blood, UA POC small     pH, UA 6.0     Protein, UA POC negative mg/dL    Urobilinogen, UA 0.2 E.U./dL mg/dL    Leukocytes, UA small     Nitrite, UA negative     Appearance, Fluid Clear Clear, Slightly Cloudy   POCT urine pregnancy   Result Value Ref Range    Preg Test, Ur negative Negative    Control pass    POCT COVID-19 Antigen Card   Result Value Ref Range    SARS-COV-2, POC Not-Detected Not Detected    Lot Number [id number]     QC Pass/Fail pass        No orders of the defined types were placed in this encounter.     New Prescriptions    No medications on file        Return if symptoms worsen or fail to improve.         Explained intended effects, potential side

## 2025-01-05 DIAGNOSIS — A54.9 GONORRHEA: Primary | ICD-10-CM

## 2025-01-05 DIAGNOSIS — B96.89 BACTERIAL VAGINOSIS: ICD-10-CM

## 2025-01-05 DIAGNOSIS — N76.0 BACTERIAL VAGINOSIS: ICD-10-CM

## 2025-01-05 LAB — HIV 1+2 AB+HIV1 P24 AG SERPL QL IA: NEGATIVE

## 2025-01-05 RX ORDER — CEFTRIAXONE 1 G/1
1000 INJECTION, POWDER, FOR SOLUTION INTRAMUSCULAR; INTRAVENOUS ONCE
Status: SHIPPED | OUTPATIENT
Start: 2025-01-05

## 2025-01-05 RX ORDER — METRONIDAZOLE 500 MG/1
500 TABLET ORAL 2 TIMES DAILY
Qty: 14 TABLET | Refills: 0 | Status: SHIPPED | OUTPATIENT
Start: 2025-01-05 | End: 2025-01-12

## 2025-01-06 LAB
HSV1 DNA CSF QL NAA+PROBE: NOT DETECTED
HSV2 DNA CSF QL NAA+PROBE: NOT DETECTED
SPECIMEN SOURCE: NORMAL

## 2025-01-31 ENCOUNTER — OFFICE VISIT (OUTPATIENT)
Dept: OBGYN CLINIC | Age: 25
End: 2025-01-31
Payer: MEDICAID

## 2025-01-31 VITALS
WEIGHT: 150 LBS | DIASTOLIC BLOOD PRESSURE: 84 MMHG | SYSTOLIC BLOOD PRESSURE: 121 MMHG | HEART RATE: 94 BPM | BODY MASS INDEX: 25.75 KG/M2

## 2025-01-31 DIAGNOSIS — N76.0 BACTERIAL VAGINOSIS: ICD-10-CM

## 2025-01-31 DIAGNOSIS — Z12.4 SCREENING FOR CERVICAL CANCER: ICD-10-CM

## 2025-01-31 DIAGNOSIS — N90.7 SEBACEOUS CYST OF LABIA: ICD-10-CM

## 2025-01-31 DIAGNOSIS — Z12.39 ENCOUNTER FOR SCREENING BREAST EXAMINATION: ICD-10-CM

## 2025-01-31 DIAGNOSIS — B96.89 BACTERIAL VAGINOSIS: ICD-10-CM

## 2025-01-31 DIAGNOSIS — N92.6 MISSED PERIOD: ICD-10-CM

## 2025-01-31 DIAGNOSIS — Z01.419 ENCOUNTER FOR WELL WOMAN EXAM WITH ROUTINE GYNECOLOGICAL EXAM: Primary | ICD-10-CM

## 2025-01-31 DIAGNOSIS — Z86.19 HISTORY OF GONORRHEA: ICD-10-CM

## 2025-01-31 LAB
CONTROL: PRESENT
PREGNANCY TEST URINE, POC: NORMAL

## 2025-01-31 PROCEDURE — 1036F TOBACCO NON-USER: CPT | Performed by: NURSE PRACTITIONER

## 2025-01-31 PROCEDURE — G8427 DOCREV CUR MEDS BY ELIG CLIN: HCPCS | Performed by: NURSE PRACTITIONER

## 2025-01-31 PROCEDURE — G8419 CALC BMI OUT NRM PARAM NOF/U: HCPCS | Performed by: NURSE PRACTITIONER

## 2025-01-31 PROCEDURE — 99395 PREV VISIT EST AGE 18-39: CPT | Performed by: NURSE PRACTITIONER

## 2025-01-31 PROCEDURE — 99214 OFFICE O/P EST MOD 30 MIN: CPT | Performed by: NURSE PRACTITIONER

## 2025-01-31 PROCEDURE — 81025 URINE PREGNANCY TEST: CPT | Performed by: NURSE PRACTITIONER

## 2025-01-31 ASSESSMENT — ENCOUNTER SYMPTOMS
CONSTIPATION: 0
GASTROINTESTINAL NEGATIVE: 1
DIARRHEA: 0
EYES NEGATIVE: 1
RESPIRATORY NEGATIVE: 1
ALLERGIC/IMMUNOLOGIC NEGATIVE: 1

## 2025-01-31 NOTE — PROGRESS NOTES
Clare Antunez is a 24 y.o. female who presents today for her medical conditions/ complaints as noted below. Clare Antunez is c/o of Annual Exam        HPI  Pt presents for well woman exam and pap smear. Was diagnosed with gonorrhea earlier this month. Had some bleeding with it, unsure if that could have been her period. Took treatment and is no longer with that partner. Has a new partner now. Denies any problems with discharge or odor at this time, but has history of Ureaplasma and recurrent BV. Questions regarding further treatment options. Has tried numerous types of birth control and did not like how they made her feel. Going to continue condoms for now.    Mammo:NA  Pap smear:  Contraception: condoms    P:1  Ab:0  Bone density:NA  Colonoscopy:NA  Patient's last menstrual period was 2024.      Past Medical History:   Diagnosis Date    Anxiety      Past Surgical History:   Procedure Laterality Date    ADENOIDECTOMY      TONSILLECTOMY      TYMPANOSTOMY TUBE PLACEMENT      WISDOM TOOTH EXTRACTION       No family history on file.  Social History     Tobacco Use    Smoking status: Never     Passive exposure: Yes    Smokeless tobacco: Never   Substance Use Topics    Alcohol use: No       Current Outpatient Medications   Medication Sig Dispense Refill    FLUoxetine (PROZAC) 10 MG capsule TAKE 1 CAPSULE BY MOUTH DAILY 30 capsule 0    ondansetron (ZOFRAN-ODT) 4 MG disintegrating tablet Take 1 tablet by mouth 3 times daily as needed for Nausea or Vomiting 21 tablet 0    busPIRone (BUSPAR) 10 MG tablet Take 1 tablet by mouth 3 times daily as needed (anxiety) 90 tablet 0     No current facility-administered medications for this visit.     No Known Allergies  Vitals:    25 1346   BP: 121/84   Pulse: 94     Body mass index is 25.75 kg/m².    Review of Systems   Constitutional: Negative.    HENT: Negative.     Eyes: Negative.    Respiratory: Negative.     Cardiovascular: Negative.

## 2025-01-31 NOTE — PROGRESS NOTES
Pt presents today for pap smear and breast exam.  She states she is late on her period. She states at end of Dec and first of  she had gonorrhea and is wanting to be retested and also had BV.  She states that on her vagina has white bump.     Mammo:NA  Pap smear:  Contraception:    P:1  Ab:0  Bone density:NA  Colonoscopy:NA

## 2025-02-04 ENCOUNTER — TELEPHONE (OUTPATIENT)
Dept: OBGYN CLINIC | Age: 25
End: 2025-02-04

## 2025-02-04 ENCOUNTER — NURSE ONLY (OUTPATIENT)
Dept: OBGYN CLINIC | Age: 25
End: 2025-02-04

## 2025-02-04 DIAGNOSIS — Z86.19 HISTORY OF GONORRHEA: ICD-10-CM

## 2025-02-04 DIAGNOSIS — N76.0 BACTERIAL VAGINOSIS: Primary | ICD-10-CM

## 2025-02-04 DIAGNOSIS — B96.89 BACTERIAL VAGINOSIS: Primary | ICD-10-CM

## 2025-02-04 LAB
C TRACH DNA CVX QL NAA+PROBE: DETECTED
N GONORRHOEA DNA SPEC QL NAA+PROBE: DETECTED
TRICHOMONAS VAGINALIS DNA: NOT DETECTED

## 2025-02-04 RX ORDER — DOXYCYCLINE HYCLATE 100 MG
100 TABLET ORAL 2 TIMES DAILY
Qty: 14 TABLET | Refills: 0 | Status: SHIPPED | OUTPATIENT
Start: 2025-02-04 | End: 2025-02-11

## 2025-02-04 RX ORDER — CEFTRIAXONE 500 MG/1
500 INJECTION, POWDER, FOR SOLUTION INTRAMUSCULAR; INTRAVENOUS ONCE
Qty: 1 EACH | Refills: 0
Start: 2025-02-04 | End: 2025-02-04

## 2025-02-04 NOTE — TELEPHONE ENCOUNTER
Pt Informed of STD results and scheduled for BELLA and coming in for her rocephin injection today (2/4/2025) at 3:00. Caty/PAU

## 2025-02-04 NOTE — PROGRESS NOTES
After obtaining consent, and per orders of Nettie LOPEZ, injection of Rocephin given in Left lower quad. abdomen by Freida Burleson MA. Patient instructed to remain in clinic for 20 minutes afterwards, and to report any adverse reaction to me immediately.    NDC: 9937-1116-08  Lot: [id number]  Exp: 11/01/2025

## 2025-02-10 ENCOUNTER — TELEPHONE (OUTPATIENT)
Dept: OBGYN CLINIC | Age: 25
End: 2025-02-10

## 2025-02-10 RX ORDER — FLUCONAZOLE 150 MG/1
150 TABLET ORAL
Qty: 2 TABLET | Refills: 0 | Status: SHIPPED | OUTPATIENT
Start: 2025-02-10 | End: 2025-02-16

## 2025-02-12 DIAGNOSIS — R30.0 DYSURIA: Primary | ICD-10-CM

## 2025-02-21 ENCOUNTER — OFFICE VISIT (OUTPATIENT)
Dept: OBGYN CLINIC | Age: 25
End: 2025-02-21
Payer: MEDICAID

## 2025-02-21 VITALS
SYSTOLIC BLOOD PRESSURE: 120 MMHG | HEART RATE: 92 BPM | WEIGHT: 152 LBS | BODY MASS INDEX: 26.09 KG/M2 | DIASTOLIC BLOOD PRESSURE: 82 MMHG

## 2025-02-21 DIAGNOSIS — A54.9 GONORRHEA: ICD-10-CM

## 2025-02-21 DIAGNOSIS — A74.9 CHLAMYDIA: Primary | ICD-10-CM

## 2025-02-21 DIAGNOSIS — R30.0 DYSURIA: ICD-10-CM

## 2025-02-21 LAB
BILIRUBIN, POC: NORMAL
BLOOD URINE, POC: NORMAL
CLARITY, POC: CLEAR
COLOR, POC: YELLOW
GLUCOSE URINE, POC: NORMAL MG/DL
KETONES, POC: NORMAL MG/DL
LEUKOCYTE EST, POC: NORMAL
NITRITE, POC: NORMAL
PH, POC: 6
PROTEIN, POC: 30 MG/DL
SPECIFIC GRAVITY, POC: 1.03
UROBILINOGEN, POC: 0.2 MG/DL

## 2025-02-21 PROCEDURE — G8427 DOCREV CUR MEDS BY ELIG CLIN: HCPCS | Performed by: NURSE PRACTITIONER

## 2025-02-21 PROCEDURE — G8419 CALC BMI OUT NRM PARAM NOF/U: HCPCS | Performed by: NURSE PRACTITIONER

## 2025-02-21 PROCEDURE — 99213 OFFICE O/P EST LOW 20 MIN: CPT | Performed by: NURSE PRACTITIONER

## 2025-02-21 PROCEDURE — 81002 URINALYSIS NONAUTO W/O SCOPE: CPT | Performed by: NURSE PRACTITIONER

## 2025-02-21 PROCEDURE — 1036F TOBACCO NON-USER: CPT | Performed by: NURSE PRACTITIONER

## 2025-02-21 ASSESSMENT — ENCOUNTER SYMPTOMS
CONSTIPATION: 0
GASTROINTESTINAL NEGATIVE: 1
EYES NEGATIVE: 1
DIARRHEA: 0
RESPIRATORY NEGATIVE: 1
ALLERGIC/IMMUNOLOGIC NEGATIVE: 1

## 2025-02-21 NOTE — PROGRESS NOTES
Pt is here for BELLA from having chlamydia and gonorrhea. She states that sometimes she has a little burning with urination. She states that she has taken all her medication and is having some itching and inflamed. She states she has had more discharge clear/white and vaginal odor sometimes.

## 2025-02-21 NOTE — PROGRESS NOTES
Clare Antunez is a 24 y.o. female who presents today for her medical conditions/ complaints as noted below. Clare Antunez is c/o of Follow-up (BELLA)        HPI  Pt presents for 3 week BELLA for chlamydia and gonorrhea. Received Rocephin injection on 25. Also took Doxycycline and Diflucan on 25. Took full course of treatment without problems. Pt states partner has been notified and also has been treated. Requesting UA in office- having some pain with urination. Unsure if she could have UTI.    Feels like she gets frequent BV and yeast infections, despite healthy lifestyle changes. Does not soak in a tub. No soaps or lotions on the vulva. Wipes front to back. No thong underwear. Positive BV on [employer] 2025. Also positive BV on 10/2024, and 2024. Interested in possible long term treatment option.     Patient's last menstrual period was 2025 (approximate).      Past Medical History:   Diagnosis Date    Anxiety      Past Surgical History:   Procedure Laterality Date    ADENOIDECTOMY      TONSILLECTOMY      TYMPANOSTOMY TUBE PLACEMENT      WISDOM TOOTH EXTRACTION       No family history on file.  Social History     Tobacco Use    Smoking status: Never     Passive exposure: Yes    Smokeless tobacco: Never   Substance Use Topics    Alcohol use: No       Current Outpatient Medications   Medication Sig Dispense Refill    FLUoxetine (PROZAC) 10 MG capsule TAKE 1 CAPSULE BY MOUTH DAILY 30 capsule 0    ondansetron (ZOFRAN-ODT) 4 MG disintegrating tablet Take 1 tablet by mouth 3 times daily as needed for Nausea or Vomiting 21 tablet 0    busPIRone (BUSPAR) 10 MG tablet Take 1 tablet by mouth 3 times daily as needed (anxiety) 90 tablet 0     No current facility-administered medications for this visit.     No Known Allergies  Vitals:    25 1538   BP: 120/82   Pulse: 92     Body mass index is 26.09 kg/m².    Review of Systems   Constitutional: Negative.    HENT: Negative.     Eyes: Negative.

## 2025-02-24 RX ORDER — METRONIDAZOLE 500 MG/1
500 TABLET ORAL 2 TIMES DAILY
Qty: 14 TABLET | Refills: 0 | Status: SHIPPED | OUTPATIENT
Start: 2025-02-24 | End: 2025-03-03

## 2025-03-04 ENCOUNTER — TELEPHONE (OUTPATIENT)
Dept: OBGYN CLINIC | Age: 25
End: 2025-03-04

## 2025-05-02 ENCOUNTER — OFFICE VISIT (OUTPATIENT)
Dept: OBGYN CLINIC | Age: 25
End: 2025-05-02
Payer: MEDICAID

## 2025-05-02 ENCOUNTER — TELEPHONE (OUTPATIENT)
Dept: OBGYN CLINIC | Age: 25
End: 2025-05-02

## 2025-05-02 VITALS
DIASTOLIC BLOOD PRESSURE: 79 MMHG | SYSTOLIC BLOOD PRESSURE: 113 MMHG | BODY MASS INDEX: 25.92 KG/M2 | WEIGHT: 151 LBS | HEART RATE: 92 BPM

## 2025-05-02 DIAGNOSIS — Z86.19 HISTORY OF TRICHOMONIASIS: ICD-10-CM

## 2025-05-02 DIAGNOSIS — R39.11 URINARY HESITANCY: ICD-10-CM

## 2025-05-02 DIAGNOSIS — R30.0 DYSURIA: ICD-10-CM

## 2025-05-02 DIAGNOSIS — R39.11 URINARY HESITANCY: Primary | ICD-10-CM

## 2025-05-02 LAB
BILIRUBIN, POC: ABNORMAL
BLOOD URINE, POC: ABNORMAL
CLARITY, POC: ABNORMAL
COLOR, POC: YELLOW
GLUCOSE URINE, POC: ABNORMAL MG/DL
KETONES, POC: ABNORMAL MG/DL
LEUKOCYTE EST, POC: ABNORMAL
NITRITE, POC: ABNORMAL
PH, POC: 6.5
PROTEIN, POC: ABNORMAL MG/DL
SPECIFIC GRAVITY, POC: 1.01
UROBILINOGEN, POC: 0.2 MG/DL

## 2025-05-02 PROCEDURE — 1036F TOBACCO NON-USER: CPT | Performed by: NURSE PRACTITIONER

## 2025-05-02 PROCEDURE — G8419 CALC BMI OUT NRM PARAM NOF/U: HCPCS | Performed by: NURSE PRACTITIONER

## 2025-05-02 PROCEDURE — 99214 OFFICE O/P EST MOD 30 MIN: CPT | Performed by: NURSE PRACTITIONER

## 2025-05-02 PROCEDURE — 81002 URINALYSIS NONAUTO W/O SCOPE: CPT | Performed by: NURSE PRACTITIONER

## 2025-05-02 PROCEDURE — G8427 DOCREV CUR MEDS BY ELIG CLIN: HCPCS | Performed by: NURSE PRACTITIONER

## 2025-05-02 RX ORDER — NITROFURANTOIN 25; 75 MG/1; MG/1
100 CAPSULE ORAL 2 TIMES DAILY
Qty: 14 CAPSULE | Refills: 0 | Status: SHIPPED | OUTPATIENT
Start: 2025-05-02 | End: 2025-05-09

## 2025-05-02 ASSESSMENT — ENCOUNTER SYMPTOMS
GASTROINTESTINAL NEGATIVE: 1
RESPIRATORY NEGATIVE: 1
DIARRHEA: 0
EYES NEGATIVE: 1
ALLERGIC/IMMUNOLOGIC NEGATIVE: 1
CONSTIPATION: 0

## 2025-05-02 NOTE — TELEPHONE ENCOUNTER
Clare called to schedule an appointment for Office Visit for UTI symptoms. [facility] was unable to accommodate in the time frame needed. Please be advised that the best time to call Anytime.    Thank you.

## 2025-05-02 NOTE — TELEPHONE ENCOUNTER
Informed patient that the provider will reach out to patient on mychart.   [facility] Call [facility]    Phone Message    May a detailed message be left on voicemail: yes     Reason for Call: Medication Refill Request    Has the patient contacted the pharmacy for the refill? Yes   Name of medication being requested: cabergoline (DOSTINEX) 0.5 MG tablet  Provider who prescribed the medication:   Pharmacy: [id number] - [place], [place] - [address] [place] [address] [address] [facility] [address]  Date medication is needed: ASAP       Per pt please make sure to change the ML to 3 ML not 0.5 ML. Please and thank you!      Action Taken: Message routed to:  [facility] ENDO    Travel Screening: Not Applicable

## 2025-05-02 NOTE — PROGRESS NOTES
Pt states she is having hesitancy and pain with urination. The pain lasts even after urinating. She did take an AZO pill Monday.

## 2025-05-04 LAB
BACTERIA UR CULT: ABNORMAL
ORGANISM: ABNORMAL
ORGANISM: ABNORMAL

## 2025-05-05 ENCOUNTER — RESULTS FOLLOW-UP (OUTPATIENT)
Dept: OBGYN CLINIC | Age: 25
End: 2025-05-05

## 2025-06-02 DIAGNOSIS — R30.0 DYSURIA: Primary | ICD-10-CM

## 2025-06-02 RX ORDER — AMOXICILLIN 500 MG/1
500 CAPSULE ORAL 2 TIMES DAILY
Qty: 14 CAPSULE | Refills: 0 | Status: SHIPPED | OUTPATIENT
Start: 2025-06-02 | End: 2025-06-09

## 2025-06-24 ENCOUNTER — OFFICE VISIT (OUTPATIENT)
Dept: OBGYN CLINIC | Age: 25
End: 2025-06-24
Payer: MEDICAID

## 2025-06-24 VITALS
BODY MASS INDEX: 25.23 KG/M2 | DIASTOLIC BLOOD PRESSURE: 81 MMHG | WEIGHT: 147 LBS | HEART RATE: 80 BPM | SYSTOLIC BLOOD PRESSURE: 119 MMHG

## 2025-06-24 DIAGNOSIS — R82.90 ABNORMAL URINE ODOR: Primary | ICD-10-CM

## 2025-06-24 DIAGNOSIS — R39.11 URINARY HESITANCY: ICD-10-CM

## 2025-06-24 DIAGNOSIS — Z11.3 SCREENING FOR STDS (SEXUALLY TRANSMITTED DISEASES): ICD-10-CM

## 2025-06-24 LAB
BILIRUBIN, POC: NORMAL
BLOOD URINE, POC: NORMAL
CLARITY, POC: NORMAL
COLOR, POC: NORMAL
GLUCOSE URINE, POC: NORMAL MG/DL
KETONES, POC: NORMAL MG/DL
LEUKOCYTE EST, POC: NORMAL
NITRITE, POC: NORMAL
PH, POC: 6
PROTEIN, POC: NORMAL MG/DL
SPECIFIC GRAVITY, POC: 1.02
UROBILINOGEN, POC: 1 MG/DL

## 2025-06-24 PROCEDURE — 99213 OFFICE O/P EST LOW 20 MIN: CPT | Performed by: NURSE PRACTITIONER

## 2025-06-24 PROCEDURE — G8427 DOCREV CUR MEDS BY ELIG CLIN: HCPCS | Performed by: NURSE PRACTITIONER

## 2025-06-24 PROCEDURE — G8419 CALC BMI OUT NRM PARAM NOF/U: HCPCS | Performed by: NURSE PRACTITIONER

## 2025-06-24 PROCEDURE — 81002 URINALYSIS NONAUTO W/O SCOPE: CPT | Performed by: NURSE PRACTITIONER

## 2025-06-24 PROCEDURE — 1036F TOBACCO NON-USER: CPT | Performed by: NURSE PRACTITIONER

## 2025-06-24 RX ORDER — AMOXICILLIN 500 MG/1
500 CAPSULE ORAL 2 TIMES DAILY
Qty: 14 CAPSULE | Refills: 0 | Status: SHIPPED | OUTPATIENT
Start: 2025-06-24 | End: 2025-07-01

## 2025-06-24 ASSESSMENT — ENCOUNTER SYMPTOMS
GASTROINTESTINAL NEGATIVE: 1
DIARRHEA: 0
CONSTIPATION: 0
ALLERGIC/IMMUNOLOGIC NEGATIVE: 1
EYES NEGATIVE: 1
RESPIRATORY NEGATIVE: 1

## 2025-06-24 NOTE — PROGRESS NOTES
Pt states last week she felt like she needed to urinate but was not able to a lot and now just has odor. She states she took the antibiotic but on some days she would forget to take it and not sure if that is why it has not went away. She is wanting to be swabbed also and is not having any symptoms.

## 2025-06-24 NOTE — PROGRESS NOTES
Clare Antunez is a 24 y.o. female who presents today for her medical conditions/ complaints as noted below. Clare Antunez is c/o of Sexually Transmitted Diseases (Screening )        HPI  History of Present Illness  The patient presents for evaluation of possible urinary tract infection.    She suspects inadequate hydration during her medication regimen may have hindered the flushing out of the infection. Her work schedule, which extends until midnight, often leads to missed second dose. She also reports infrequent urination due to lack of breaks at work. Despite starting D-mannose, she believes the infection persists. She does not suspect bacterial vaginosis or sexually transmitted diseases but is open to testing. She associates BV with discharge and a distinct odor, neither of which she currently experiences. However, she notes a strong odor in her urine, particularly when urinating in the shower. She recalls seeing small white strings in her urine last week, a symptom she typically associates with UTIs. She recently underwent waxing, resulting in some redness. She suspects a hair bump near her buttocks but is unsure. She does not report any symptoms of a yeast infection. She uses a mauri toy for sexual pleasure and wonders if it could be contributing to her symptoms. She has previously taken penicillin, which provided relief after 4 days. She reports no burning sensation during urination but mentions a slight sting towards the end of the stream. She occasionally experiences stomach pain in the morning. She has a history of severe UTIs, which were previously managed with D-mannose.    GYNECOLOGICAL HISTORY:  Gynecology History discussed      Patient's last menstrual period was 2025.      Past Medical History:   Diagnosis Date    Anxiety      Past Surgical History:   Procedure Laterality Date    ADENOIDECTOMY      TONSILLECTOMY      TYMPANOSTOMY TUBE PLACEMENT      WISDOM TOOTH EXTRACTION       No

## 2025-06-25 ENCOUNTER — OFFICE VISIT (OUTPATIENT)
Dept: PRIMARY CARE CLINIC | Age: 25
End: 2025-06-25
Payer: MEDICAID

## 2025-06-25 VITALS
SYSTOLIC BLOOD PRESSURE: 120 MMHG | HEART RATE: 67 BPM | DIASTOLIC BLOOD PRESSURE: 70 MMHG | TEMPERATURE: 97 F | WEIGHT: 151 LBS | BODY MASS INDEX: 25.78 KG/M2 | OXYGEN SATURATION: 98 % | HEIGHT: 64 IN

## 2025-06-25 DIAGNOSIS — K13.70 ORAL LESION: Primary | ICD-10-CM

## 2025-06-25 DIAGNOSIS — L74.0 HEAT RASH: ICD-10-CM

## 2025-06-25 PROCEDURE — G8419 CALC BMI OUT NRM PARAM NOF/U: HCPCS | Performed by: NURSE PRACTITIONER

## 2025-06-25 PROCEDURE — 1036F TOBACCO NON-USER: CPT | Performed by: NURSE PRACTITIONER

## 2025-06-25 PROCEDURE — G8427 DOCREV CUR MEDS BY ELIG CLIN: HCPCS | Performed by: NURSE PRACTITIONER

## 2025-06-25 PROCEDURE — 99214 OFFICE O/P EST MOD 30 MIN: CPT | Performed by: NURSE PRACTITIONER

## 2025-06-25 RX ORDER — CHLORHEXIDINE GLUCONATE ORAL RINSE 1.2 MG/ML
15 SOLUTION DENTAL 2 TIMES DAILY
Qty: 420 ML | Refills: 0 | Status: SHIPPED | OUTPATIENT
Start: 2025-06-25 | End: 2025-07-09

## 2025-06-25 SDOH — ECONOMIC STABILITY: FOOD INSECURITY: WITHIN THE PAST 12 MONTHS, YOU WORRIED THAT YOUR FOOD WOULD RUN OUT BEFORE YOU GOT MONEY TO BUY MORE.: PATIENT DECLINED

## 2025-06-25 SDOH — ECONOMIC STABILITY: FOOD INSECURITY: WITHIN THE PAST 12 MONTHS, THE FOOD YOU BOUGHT JUST DIDN'T LAST AND YOU DIDN'T HAVE MONEY TO GET MORE.: PATIENT DECLINED

## 2025-06-25 ASSESSMENT — PATIENT HEALTH QUESTIONNAIRE - PHQ9
SUM OF ALL RESPONSES TO PHQ QUESTIONS 1-9: 0
1. LITTLE INTEREST OR PLEASURE IN DOING THINGS: NOT AT ALL
SUM OF ALL RESPONSES TO PHQ QUESTIONS 1-9: 0
2. FEELING DOWN, DEPRESSED OR HOPELESS: NOT AT ALL
SUM OF ALL RESPONSES TO PHQ QUESTIONS 1-9: 0
SUM OF ALL RESPONSES TO PHQ QUESTIONS 1-9: 0

## 2025-06-25 NOTE — PROGRESS NOTES
[address] [place] [address] Drive   [address] [facility], [address]     Phone:  [phone]  Fax:  [phone]      Clare Antunez is a 24 y.o. female who presents today for her medical conditions/complaints as noted below.  Clare Antunez is c/o of Mouth Lesions and Bumps (With itching.)      Chief Complaint   Patient presents with    Mouth Lesions    Bumps     With itching.       HPI:     History of Present Illness  The patient presents for evaluation of oral lesions and skin rash.    She reports the presence of small lumps in the posterior region of her throat, which she observed during a self-examination. Additionally, she has identified similar lesions on her breast and other areas of her body. These lesions are not associated with any burning sensation but cause discomfort due to friction. She mentions that her son was diagnosed with hand, foot, and mouth disease at the end of 05/2025, which initially presented as buttock lesions before spreading to his feet. Her own symptoms began with pruritic lesions, followed by the appearance of oral lesions a few days later.    She has noticed some spots on her body, including one on the side of her breast. She reports that the spots do not burn, but they cause irritation when rubbed.          Past Medical History:   Diagnosis Date    Anxiety         Past Surgical History:   Procedure Laterality Date    ADENOIDECTOMY      TONSILLECTOMY      TYMPANOSTOMY TUBE PLACEMENT      WISDOM TOOTH EXTRACTION         Social History     Tobacco Use    Smoking status: Never     Passive exposure: Yes    Smokeless tobacco: Never   Substance Use Topics    Alcohol use: No        Current Outpatient Medications   Medication Sig Dispense Refill    chlorhexidine (PERIDEX) 0.12 % solution Swish and spit 15 mLs 2 times daily for 14 days 420 mL 0    amoxicillin (AMOXIL) 500 MG capsule Take 1 capsule by mouth 2 times daily for 7 days (Patient taking differently: Take 1 capsule by mouth 2 times

## 2025-07-01 RX ORDER — FLUCONAZOLE 150 MG/1
150 TABLET ORAL
Qty: 2 TABLET | Refills: 0 | Status: SHIPPED | OUTPATIENT
Start: 2025-07-01 | End: 2025-07-07

## 2025-07-01 RX ORDER — METRONIDAZOLE 500 MG/1
500 TABLET ORAL 2 TIMES DAILY
Qty: 14 TABLET | Refills: 0 | Status: SHIPPED | OUTPATIENT
Start: 2025-07-01 | End: 2025-07-08

## 2025-07-05 ENCOUNTER — RESULTS FOLLOW-UP (OUTPATIENT)
Dept: OBGYN CLINIC | Age: 25
End: 2025-07-05

## 2025-08-06 ENCOUNTER — TELEPHONE (OUTPATIENT)
Dept: OBGYN CLINIC | Age: 25
End: 2025-08-06

## 2025-08-06 RX ORDER — FLUCONAZOLE 150 MG/1
150 TABLET ORAL
Qty: 2 TABLET | Refills: 0 | Status: SHIPPED | OUTPATIENT
Start: 2025-08-06 | End: 2025-08-12

## 2025-08-26 ENCOUNTER — TELEPHONE (OUTPATIENT)
Dept: PRIMARY CARE CLINIC | Age: 25
End: 2025-08-26